# Patient Record
Sex: FEMALE | Race: ASIAN | Employment: OTHER | URBAN - METROPOLITAN AREA
[De-identification: names, ages, dates, MRNs, and addresses within clinical notes are randomized per-mention and may not be internally consistent; named-entity substitution may affect disease eponyms.]

---

## 2017-01-03 ENCOUNTER — OFFICE VISIT (OUTPATIENT)
Dept: FAMILY MEDICINE | Facility: CLINIC | Age: 72
End: 2017-01-03
Payer: COMMERCIAL

## 2017-01-03 VITALS
WEIGHT: 114.1 LBS | RESPIRATION RATE: 16 BRPM | BODY MASS INDEX: 20.99 KG/M2 | DIASTOLIC BLOOD PRESSURE: 72 MMHG | SYSTOLIC BLOOD PRESSURE: 122 MMHG | OXYGEN SATURATION: 96 % | HEART RATE: 73 BPM | HEIGHT: 62 IN | TEMPERATURE: 98.3 F

## 2017-01-03 DIAGNOSIS — G89.29 CHRONIC PAIN OF RIGHT ANKLE: ICD-10-CM

## 2017-01-03 DIAGNOSIS — R21 RASH: ICD-10-CM

## 2017-01-03 DIAGNOSIS — M79.622 PAIN OF LEFT UPPER ARM: Primary | ICD-10-CM

## 2017-01-03 DIAGNOSIS — M25.571 CHRONIC PAIN OF RIGHT ANKLE: ICD-10-CM

## 2017-01-03 DIAGNOSIS — I50.9 HEART FAILURE, UNSPECIFIED HEART FAILURE CHRONICITY, UNSPECIFIED HEART FAILURE TYPE: ICD-10-CM

## 2017-01-03 PROCEDURE — 99213 OFFICE O/P EST LOW 20 MIN: CPT | Performed by: FAMILY MEDICINE

## 2017-01-03 RX ORDER — KETOCONAZOLE 20 MG/G
CREAM TOPICAL DAILY
COMMUNITY
End: 2018-07-19

## 2017-01-03 RX ORDER — LOSARTAN POTASSIUM 25 MG/1
25 TABLET ORAL DAILY
COMMUNITY

## 2017-01-03 NOTE — PROGRESS NOTES
SUBJECTIVE:                                                    Devyn Link is a 71 year old female who presents to clinic today for the following health issues:      Musculoskeletal problem/pain      Duration: x 2 months ago    Description  Location: left upper arm    Intensity:  5-6/10 with arm movement    Accompanying signs and symptoms: none    History  Previous similar problem: no   Previous evaluation:  none    Precipitating or alleviating factors:  Trauma or overuse: YES- after receiving the flu shot noticed the pain and has not gone away  Aggravating factors include: moving the arm in different positions.    Therapies tried and outcome: massage- is effective     Here to discuss itching and redness with the skin on the forehead that has lasted x 1/2 year.  Starting as a small area and has gotten bigger over time. Has tried using the Ketoconazole 2% is not effective.        Patient Active Problem List   Diagnosis     Hyperlipidemia LDL goal <70     CAD (coronary artery disease)     Long term current use of anticoagulant     Hypertension goal BP (blood pressure) < 140/90     Advanced directives, counseling/discussion     Bradycardia     Health Care Home     Chronic atrial fibrillation (H)     Heart failure (H)     Anemia     Pacemaker     Aortic valve replaced     Mitral valve replaced     Abnormal glucose     Prediabetes       Current Outpatient Prescriptions   Medication Sig Dispense Refill     losartan (COZAAR) 25 MG tablet Take 25 mg by mouth daily       ketoconazole (NIZORAL) 2 % cream Apply topically daily       amoxicillin (AMOXIL) 500 MG capsule Take 4 capsules (2,000 mg) by mouth daily Take 4 cap--200mg-- 30-60 minutes before procedure 4 capsule 0     warfarin (COUMADIN) 1 MG tablet Half tablet on Fri 30 tablet      warfarin (COUMADIN) 1 MG tablet Mon, Tues, Wed, Thurs, Sat, Sun 30 tablet      famotidine (PEPCID) 20 MG tablet Take 1 tablet (20 mg) by mouth 2 times daily 60 tablet 1     carvedilol (COREG)  "3.125 MG tablet Take 1 tablet (3.125 mg) by mouth 2 times daily (with meals) 60 tablet      amiodarone (PACERONE/CODARONE) 200 MG tablet Take 1 tablet (200 mg) by mouth daily 60 tablet      spironolactone (ALDACTONE) 25 MG tablet Take 0.5 tablets (12.5 mg) by mouth daily 30 tablet 1     atorvastatin (LIPITOR) 20 MG tablet Take 1 tablet (20 mg) by mouth daily 90 tablet 1     furosemide (LASIX) 20 MG tablet Take 20 mg by mouth Takes every other day 60 tablet 1     nitroglycerin (NITROSTAT) 0.4 MG SL tablet Place 1 tablet under the tongue every 5 minutes as needed for chest pain. 25 tablet 0     aspirin 81 MG tablet Take 1 tablet by mouth daily.  3         ROS:  Here with :    CONSTITUTIONAL:NEGATIVE for fever, chills, change in weight  CV: NEGATIVE for chest pain, palpitations or peripheral edema  MUSCULOSKELETAL: see below  PSYCHIATRIC: NEGATIVE for changes in mood or affect    Left shoulder; hurts with movement.  Noted since the flu shot, but they do not feel this caused it.    Also has decreased balance.  Fell down twice last year. Right ankle. Hurts then gives way.        OBJECTIVE:                                                    /72 mmHg  Pulse 73  Temp(Src) 98.3  F (36.8  C) (Oral)  Resp 16  Ht 5' 2.25\" (1.581 m)  Wt 114 lb 1.6 oz (51.755 kg)  BMI 20.71 kg/m2  SpO2 96%  Body mass index is 20.71 kg/(m^2).  GENERAL APPEARANCE: alert and no distress  CV: regular rates and rhythm  MS: there is minimal to no tenderness with palpation at the deltoid area.  With movement of the arm, especially forward and up, she notes pain in the anterior part of the deltoid.  Ankle has no deformity or swelling.   PSYCH: mentation appears normal and affect normal/bright         ASSESSMENT/PLAN:                                                      Pain of left upper arm  Uncertain etiology. Seems to be near the deltoid. May be improving. Will give more time. Discussed local treatment; consider FSOC    Heart " failure, unspecified heart failure chronicity, unspecified heart failure type (H)  Stable. Completed and discussed.   - HEART FAILURE ACTION PLAN    Chronic pain of right ankle  Discussed some exercises. Consider physical therapy.     Rash    - SKIN CARE REFERRAL      Follow up prn or as previously directed.    Patient Instructions   On the left arm, try some heat and ice.  I would think of heat first. But go with what works. Some find that alternating between the two can be helpful.    If not getting better, I would call the  [facility]. They do have a site in [place]:     [phone].    --------------------------------------      Let me know if you would like to pursue more formal exercise/PT        Foot and Ankle Exercises: Ankle Circles    This exercise is designed to stretch and strengthen your feet and ankles. Before beginning the exercise, read through all the instructions. While exercising, breathe normally. If you feel any pain, stop the exercise. If pain persists, inform your healthcare provider.    Sit straight-legged on the floor or other firm surface.    Resting your ______ calf on a rolled-up towel, use your foot to draw circles in both directions or write the letters of the alphabet in the air.    Continue for ______ seconds. Do ______ times a day.    4874-3266 [employer]. [address] [place] [address]. All rights reserved. This information is not intended as a substitute for professional medical care. Always follow your healthcare professional's instructions.        Bent-Knee Calf Stretch  This exercise is designed to stretch and strengthen your feet and ankles. Before beginning the exercise, read through all the instructions. While exercising, breathe normally and don t bounce. If you feel any pain, stop the exercise. If pain persists, inform your healthcare provider:      Stand an arm s length away from a wall. Place the palms of your hands on the  wall. Step forward about 12 inches with your ______ foot.    Keeping toes pointed forward and both heels on the floor, bend both knees and lean forward. Hold for ______ seconds. Relax.    Repeat ______ times. Do ______ sets a day.    2880-9484 [employer]. [address] [place] [address]. All rights reserved. This information is not intended as a substitute for professional medical care. Always follow your healthcare professional's instructions.        Foot and Ankle Exercises: Single-Leg Heel Raise  This exercise is designed to stretch and strengthen your feet and ankles. Before beginning the exercise, read through all the instructions. While exercising, breathe normally and don t bounce. If you feel any pain, stop the exercise. If pain persists, inform your healthcare provider:    Stand, using a sturdy counter for balance only. Lift 1 foot and stand with your weight on the other foot.    Rise up on your toes, then lower back onto your heel.    Repeat 10 times. Do 3 sets a day.    9174-9060 [employer]. [address] [place] [address]. All rights reserved. This information is not intended as a substitute for professional medical care. Always follow your healthcare professional's instructions.              Prisca Lucero MD, MD  [facility]

## 2017-01-03 NOTE — PATIENT INSTRUCTIONS
On the left arm, try some heat and ice.  I would think of heat first. But go with what works. Some find that alternating between the two can be helpful.    If not getting better, I would call the  [facility] and Orth[facility] clinic. They do have a site in [place]:     [phone].    --------------------------------------      Let me know if you would like to pursue more formal exercise/PT        Foot and Ankle Exercises: Ankle Circles    This exercise is designed to stretch and strengthen your feet and ankles. Before beginning the exercise, read through all the instructions. While exercising, breathe normally. If you feel any pain, stop the exercise. If pain persists, inform your healthcare provider.    Sit straight-legged on the floor or other firm surface.    Resting your ______ calf on a rolled-up towel, use your foot to draw circles in both directions or write the letters of the alphabet in the air.    Continue for ______ seconds. Do ______ times a day.    3510-2412 [employer]. [address] [place] [address]. All rights reserved. This information is not intended as a substitute for professional medical care. Always follow your healthcare professional's instructions.        Bent-Knee Calf Stretch  This exercise is designed to stretch and strengthen your feet and ankles. Before beginning the exercise, read through all the instructions. While exercising, breathe normally and don t bounce. If you feel any pain, stop the exercise. If pain persists, inform your healthcare provider:      Stand an arm s length away from a wall. Place the palms of your hands on the wall. Step forward about 12 inches with your ______ foot.    Keeping toes pointed forward and both heels on the floor, bend both knees and lean forward. Hold for ______ seconds. Relax.    Repeat ______ times. Do ______ sets a day.    6257-0754 [employer]. [address] [place] [address]. All rights reserved.  This information is not intended as a substitute for professional medical care. Always follow your healthcare professional's instructions.        Foot and Ankle Exercises: Single-Leg Heel Raise  This exercise is designed to stretch and strengthen your feet and ankles. Before beginning the exercise, read through all the instructions. While exercising, breathe normally and don t bounce. If you feel any pain, stop the exercise. If pain persists, inform your healthcare provider:    Stand, using a sturdy counter for balance only. Lift 1 foot and stand with your weight on the other foot.    Rise up on your toes, then lower back onto your heel.    Repeat 10 times. Do 3 sets a day.    5906-2175 [employer]. [address] [place] [address], [place] [address]. All rights reserved. This information is not intended as a substitute for professional medical care. Always follow your healthcare professional's instructions.

## 2017-01-03 NOTE — MR AVS SNAPSHOT
After Visit Summary   1/3/2017    Devyn Link    MRN: [id number]           Patient Information     Date Of Birth          1945        Visit Information        Provider Department      1/3/2017 7:15 AM Open, Assignments; Prisca Lucero MD [facility]        Today's Diagnoses     Rash    -  1     Pain of left upper arm         Heart failure, unspecified heart failure chronicity, unspecified heart failure type (H)           Care Instructions    On the left arm, try some heat and ice.  I would think of heat first. But go with what works. Some find that alternating between the two can be helpful.    If not getting better, I would call the  Sports and Ortho clinic. They do have a site in [place]:     [phone].    --------------------------------------      Let me know if you would like to pursue more formal exercise/PT        Foot and Ankle Exercises: Ankle Circles    This exercise is designed to stretch and strengthen your feet and ankles. Before beginning the exercise, read through all the instructions. While exercising, breathe normally. If you feel any pain, stop the exercise. If pain persists, inform your healthcare provider.    Sit straight-legged on the floor or other firm surface.    Resting your ______ calf on a rolled-up towel, use your foot to draw circles in both directions or write the letters of the alphabet in the air.    Continue for ______ seconds. Do ______ times a day.    8756-5185 [employer]. [address] [place] [address], [place] [address]. All rights reserved. This information is not intended as a substitute for professional medical care. Always follow your healthcare professional's instructions.        Bent-Knee Calf Stretch  This exercise is designed to stretch and strengthen your feet and ankles. Before beginning the exercise, read through all the instructions. While exercising, breathe normally and don t bounce. If you feel any pain, stop the  exercise. If pain persists, inform your healthcare provider:      Stand an arm s length away from a wall. Place the palms of your hands on the wall. Step forward about 12 inches with your ______ foot.    Keeping toes pointed forward and both heels on the floor, bend both knees and lean forward. Hold for ______ seconds. Relax.    Repeat ______ times. Do ______ sets a day.    8858-9584 [employer]. [address] [place] [address]. All rights reserved. This information is not intended as a substitute for professional medical care. Always follow your healthcare professional's instructions.        Foot and Ankle Exercises: Single-Leg Heel Raise  This exercise is designed to stretch and strengthen your feet and ankles. Before beginning the exercise, read through all the instructions. While exercising, breathe normally and don t bounce. If you feel any pain, stop the exercise. If pain persists, inform your healthcare provider:    Stand, using a sturdy counter for balance only. Lift 1 foot and stand with your weight on the other foot.    Rise up on your toes, then lower back onto your heel.    Repeat 10 times. Do 3 sets a day.    7282-7649 [employer]. [address] [place] [address]. All rights reserved. This information is not intended as a substitute for professional medical care. Always follow your healthcare professional's instructions.              Follow-ups after your visit        Additional Services     SKIN CARE REFERRAL       Your provider has referred you to: [facility]: [facility] [place] [phone]   [email or web address]     Please be aware that coverage of these services is subject to the terms and limitations of your health insurance plan.  Please check with your insurance prior to the appointment to ensure appropriate coverage for any services considered cosmetic in nature or not medically  "necessary.    Please bring the following with you to your appointment:    (1) Any X-Rays, CTs or MRIs which have been performed.  Contact the facility where they were done to arrange for  prior to your scheduled appointment.  Any new CT, MRI or other procedures ordered by your specialist must be performed at a [facility] facility or coordinated by your clinic's referral office.  (2) List of current medications  (3) This referral request   (4) Any documents/labs given to you for this referral                  Who to contact     If you have questions or need follow up information about today's clinic visit or your schedule please contact [facility] directly at [phone].  Normal or non-critical lab and imaging results will be communicated to you by MyChart, letter or phone within 4 business days after the clinic has received the results. If you do not hear from us within 7 days, please contact the clinic through [employer] or phone. If you have a critical or abnormal lab result, we will notify you by phone as soon as possible.  Submit refill requests through [email or web address] or call your pharmacy and they will forward the refill request to us. Please allow 3 business days for your refill to be completed.          Additional Information About Your Visit        [facility] Information     [email or web address] gives you secure access to your electronic health record. If you see a primary care provider, you can also send messages to your care team and make appointments. If you have questions, please call your primary care clinic.  If you do not have a primary care provider, please call [phone] and they will assist you.        Your Vitals Were     Pulse Temperature Respirations Height BMI (Body Mass Index) Pulse Oximetry    73 98.3  F (36.8  C) (Oral) 16 5' 2.25\" (1.581 m) 20.71 kg/m2 96%       Blood Pressure from Last 3 Encounters:   01/03/17 122/72   08/30/16 116/62   04/14/16 114/60    Weight from Last 3 Encounters: "   01/03/17 114 lb 1.6 oz (51.755 kg)   08/30/16 109 lb 11.2 oz (49.76 kg)   04/14/16 120 lb 6.4 oz (54.613 kg)              We Performed the Following     HEART FAILURE ACTION PLAN     SKIN CARE REFERRAL          Today's Medication Changes          These changes are accurate as of: 1/3/17  8:21 AM.  If you have any questions, ask your nurse or doctor.               These medicines have changed or have updated prescriptions.        Dose/Directions    losartan 25 MG tablet   Commonly known as:  COZAAR   This may have changed:  Another medication with the same name was removed. Continue taking this medication, and follow the directions you see here.   Changed by:  Prisca Lucero MD        Dose:  25 mg   Take 25 mg by mouth daily   Refills:  0                Primary Care Provider Office Phone # Fax #    Prisca Lucero [phone]       [facility] [address] [facility] [address]        Thank you!     Thank you for choosing [facility]  for your care. Our goal is always to provide you with excellent care. Hearing back from our patients is one way we can continue to improve our services. Please take a few minutes to complete the written survey that you may receive in the mail after your visit with us. Thank you!             Your Updated Medication List - Protect others around you: Learn how to safely use, store and throw away your medicines at www.disposemymeds.org.          This list is accurate as of: 1/3/17  8:21 AM.  Always use your most recent med list.                   Brand Name Dispense Instructions for use    amiodarone 200 MG tablet    PACERONE/CODARONE    60 tablet    Take 1 tablet (200 mg) by mouth daily       amoxicillin 500 MG capsule    AMOXIL    4 capsule    Take 4 capsules (2,000 mg) by mouth daily Take 4 cap--200mg-- 30-60 minutes before procedure       aspirin 81 MG tablet      Take 1 tablet by mouth daily.       carvedilol 3.125 MG tablet    COREG     60 tablet    Take 1 tablet (3.125 mg) by mouth 2 times daily (with meals)       famotidine 20 MG tablet    PEPCID    60 tablet    Take 1 tablet (20 mg) by mouth 2 times daily       ketoconazole 2 % cream    NIZORAL     Apply topically daily       LASIX 20 MG tablet   Generic drug:  furosemide     60 tablet    Take 20 mg by mouth Takes every other day       LIPITOR 20 MG tablet   Generic drug:  atorvastatin     90 tablet    Take 1 tablet (20 mg) by mouth daily       losartan 25 MG tablet    COZAAR     Take 25 mg by mouth daily       nitroglycerin 0.4 MG sublingual tablet    NITROSTAT    25 tablet    Place 1 tablet under the tongue every 5 minutes as needed for chest pain.       spironolactone 25 MG tablet    ALDACTONE    30 tablet    Take 0.5 tablets (12.5 mg) by mouth daily       * warfarin 1 MG tablet    COUMADIN    30 tablet    Half tablet on Fri       * warfarin 1 MG tablet    COUMADIN    30 tablet    Mon, Tues, Wed, Thurs, Sat, Sun       * Notice:  This list has 2 medication(s) that are the same as other medications prescribed for you. Read the directions carefully, and ask your doctor or other care provider to review them with you.

## 2017-01-03 NOTE — NURSING NOTE
"Chief Complaint   Patient presents with     Musculoskeletal Problem     left arm     Derm Problem       Initial /72 mmHg  Pulse 73  Temp(Src) 98.3  F (36.8  C) (Oral)  Resp 16  Ht 5' 2.25\" (1.581 m)  Wt 114 lb 1.6 oz (51.755 kg)  BMI 20.71 kg/m2  SpO2 96% Estimated body mass index is 20.71 kg/(m^2) as calculated from the following:    Height as of this encounter: 5' 2.25\" (1.581 m).    Weight as of this encounter: 114 lb 1.6 oz (51.755 kg).  BP completed using cuff size: pediatric  Dianne Turcios CMA      "

## 2017-01-03 NOTE — Clinical Note
My Heart Failure Action Plan   Name: Devyn Link    YOB: 1945   Date: 1/3/2017    My doctor: Prisca Lucero     [address] [address] [phone]  My Diagnosis:    My Ejection Fraction:     My Exercise Goal: 30 minutes daily  .     My Weight Goal: 114-115   Wt Readings from Last 2 Encounters:   01/03/17 114 lb 1.6 oz (51.755 kg)   08/30/16 109 lb 11.2 oz (49.76 kg)     Weigh yourself daily using the same scale. If you gain more than 2 pounds in 24 hours or 5 pounds in a week call the clinic    My Diet Goal: No added salt    Emergency Room Visits:    Our goal is to improve your quality of life and help you avoid a visit to the emergency room or hospital.  If we work together, we can achieve this goal. But, if you feel you need to call 911 or go to the emergency room, please do so.  If you go to the emergency room, please bring your list of medicines and your daily weight chart with you.       GREEN ZONE     Doing well today    Weight gained is no more than 2 pounds a day or 5 pounds a week.    No swelling in feet, ankles, legs or stomach.    No more swelling than usual.    No more trouble breathing than usual.    No change in my sleep.    No other problems. Actions:    I am doing fine.  I will take my medicine, follow my diet, see my doctor, exercise, and watch for symptoms.           YELLOW ZONE         Having a bad day or flare up    Weight gain of more than 2 pounds in one day or 5 pounds in one week.    New swelling in ankle, leg, knee or thigh.    Bloating in belly, pants feel tighter.    Swelling in hands or face.    Coughing or trouble breathing while walking or talking.    Harder to breathe last night.    Have trouble sleeping, wake up short of breath.    Much more tired than usual.    Not eating.    Pain in my chest or bad leg cramps.    Feel weak or dizzy. Actions:    I need to take action and call my doctor or nurse today.                  RED ZONE         Need medical care now    Weight gain of 5 pounds overnight.    Chest pain or pressure that does not go away.    Feel less alert.    Wheezing or have trouble breathing when at rest.    Cannot sleep lying down.    Cannot take my water pill.    Pass out or faint. Actions:    I need to call my doctor or nurse now!    Call 911 if I have chest pain or cannot breathe.        Electronically signed by: Prisca Lucero MD, January 3, 2017

## 2017-01-13 ENCOUNTER — OFFICE VISIT (OUTPATIENT)
Dept: FAMILY MEDICINE | Facility: CLINIC | Age: 72
End: 2017-01-13
Payer: COMMERCIAL

## 2017-01-13 DIAGNOSIS — R21 LOCALIZED MACULAR RASH: ICD-10-CM

## 2017-01-13 PROCEDURE — 99213 OFFICE O/P EST LOW 20 MIN: CPT | Performed by: FAMILY MEDICINE

## 2017-01-13 RX ORDER — DESONIDE 0.5 MG/G
OINTMENT TOPICAL
Qty: 15 G | Refills: 0 | Status: CANCELLED | OUTPATIENT
Start: 2017-01-13

## 2017-01-13 RX ORDER — DESONIDE 0.5 MG/G
CREAM TOPICAL
Qty: 15 G | Refills: 0 | Status: SHIPPED
Start: 2017-01-13 | End: 2017-01-13

## 2017-01-13 RX ORDER — DESONIDE 0.5 MG/G
CREAM TOPICAL
Qty: 15 G | Refills: 0 | Status: SHIPPED | OUTPATIENT
Start: 2017-01-13 | End: 2018-07-19

## 2017-01-13 NOTE — PATIENT INSTRUCTIONS
"FUTURE APPOINTMENTS  Follow up in 2 week(s) if symptoms not resolving.    TOPICAL STEROID INSTRUCTIONS  Desonide 0.05% cream.    Apply an amount equal to half of a fingertip (0.25 g) to the affected area(s) on the left forehead, two times per day for 7-10 days.    \"Fingertip Amount\"      This is a weak strength steroid, and it can be used on the face.    Not to be used consecutively for more than 10 days.    Topical steroid use is for short-term treatment only. Although you can use this as needed for flare-ups, if after initial treatment, you are using this for prolonged periods of time (i.e. every day of the week), re-check for evaluation of treatment.    Keep in mind to also regularly use moisturizer, as this preventative measure can help maintain your skin's natural moisture barrier.    DRY SKIN INSTRUCTIONS  Routine use of moisturizer is important for healthy, resilient skin.    Twice daily use of a moisturizer such as over-the-counter (OTC) CeraVe moisturizer cream (in the jar) or OTC Cetaphil RestoraDerm moisturizer. These contain ceramides and filaggrin proteins that can help to maintain the body's moisture layer.    Always apply moisturizer after washing, within 3 minutes of drying off for best effect.    Do not overuse soap. Just apply soap on the groin and armpits, unless you have been sweating extensively. Recommended products include OTC unscented Dove sensitive skin or OTC Vanicream cleansing bar.    Good facial cleansers include OTC CeraVe hydrating facial cleanser or OTC Cetaphil daily facial cleanser.    Avoid use of scented products and/or antistatic dryer sheets.  "

## 2017-01-13 NOTE — MR AVS SNAPSHOT
"              After Visit Summary   1/13/2017    Devyn Link    MRN: [id number]           Patient Information     Date Of Birth          1945        Visit Information        Provider Department      1/13/2017 10:15 AM Krystina Wilder MD; [place] [employer] [facility] - Primary Care Skin        Today's Diagnoses     Localized macular rash           Care Instructions    FUTURE APPOINTMENTS  Follow up in 2 week(s) if symptoms not resolving.    TOPICAL STEROID INSTRUCTIONS  Desonide 0.05% cream.    Apply an amount equal to half of a fingertip (0.25 g) to the affected area(s) on the left forehead, two times per day for 7-10 days.    \"Fingertip Amount\"      This is a weak strength steroid, and it can be used on the face.    Not to be used consecutively for more than 10 days.    Topical steroid use is for short-term treatment only. Although you can use this as needed for flare-ups, if after initial treatment, you are using this for prolonged periods of time (i.e. every day of the week), re-check for evaluation of treatment.    Keep in mind to also regularly use moisturizer, as this preventative measure can help maintain your skin's natural moisture barrier.    DRY SKIN INSTRUCTIONS  Routine use of moisturizer is important for healthy, resilient skin.    Twice daily use of a moisturizer such as over-the-counter (OTC) CeraVe moisturizer cream (in the jar) or OTC Cetaphil RestoraDerm moisturizer. These contain ceramides and filaggrin proteins that can help to maintain the body's moisture layer.    Always apply moisturizer after washing, within 3 minutes of drying off for best effect.    Do not overuse soap. Just apply soap on the groin and armpits, unless you have been sweating extensively. Recommended products include OTC unscented Dove sensitive skin or OTC Vanicream cleansing bar.    Good facial cleansers include OTC CeraVe hydrating facial cleanser or OTC Cetaphil daily facial " cleanser.    Avoid use of scented products and/or antistatic dryer sheets.        Follow-ups after your visit        Who to contact     If you have questions or need follow up information about today's clinic visit or your schedule please contact [facility] - PRIMARY CARE SKIN directly at [phone].  Normal or non-critical lab and imaging results will be communicated to you by MyChart, letter or phone within 4 business days after the clinic has received the results. If you do not hear from us within 7 days, please contact the clinic through [employer] or phone. If you have a critical or abnormal lab result, we will notify you by phone as soon as possible.  Submit refill requests through [employer] or call your pharmacy and they will forward the refill request to us. Please allow 3 business days for your refill to be completed.          Additional Information About Your Visit        MyChart Information     [employer] gives you secure access to your electronic health record. If you see a primary care provider, you can also send messages to your care team and make appointments. If you have questions, please call your primary care clinic.  If you do not have a primary care provider, please call [phone] and they will assist you.        Care EveryWhere ID     This is your Care EveryWhere ID. This could be used by other organizations to access your [place] medical records  [id number]         Blood Pressure from Last 3 Encounters:   01/03/17 122/72   08/30/16 116/62   04/14/16 114/60    Weight from Last 3 Encounters:   01/03/17 114 lb 1.6 oz (51.755 kg)   08/30/16 109 lb 11.2 oz (49.76 kg)   04/14/16 120 lb 6.4 oz (54.613 kg)              Today, you had the following     No orders found for display         Today's Medication Changes          These changes are accurate as of: 1/13/17 10:52 AM.  If you have any questions, ask your nurse or doctor.               Start taking these medicines.        Dose/Directions     desonide 0.05 % cream   Commonly known as:  DESOWEN   Used for:  Localized macular rash   Started by:  Krystina Wilder MD        Apply sparingly to affected area three times daily for 14 days.   Quantity:  15 g   Refills:  0            Where to get your medicines      These medications were sent to [facility]  [address] [facility] [address]    Hours:  Mail Order Phone:  [phone]    - desonide 0.05 % cream             Primary Care Provider Office Phone # Fax #    Prisca Lucero [phone]       [facility] [address] [facility] [address]        Thank you!     Thank you for choosing [facility] - PRIMARY CARE SKIN  for your care. Our goal is always to provide you with excellent care. Hearing back from our patients is one way we can continue to improve our services. Please take a few minutes to complete the written survey that you may receive in the mail after your visit with us. Thank you!             Your Updated Medication List - Protect others around you: Learn how to safely use, store and throw away your medicines at www.disposemymeds.org.          This list is accurate as of: 1/13/17 10:52 AM.  Always use your most recent med list.                   Brand Name Dispense Instructions for use    amiodarone 200 MG tablet    PACERONE/CODARONE    60 tablet    Take 1 tablet (200 mg) by mouth daily       amoxicillin 500 MG capsule    AMOXIL    4 capsule    Take 4 capsules (2,000 mg) by mouth daily Take 4 cap--200mg-- 30-60 minutes before procedure       aspirin 81 MG tablet      Take 1 tablet by mouth daily.       carvedilol 3.125 MG tablet    COREG    60 tablet    Take 1 tablet (3.125 mg) by mouth 2 times daily (with meals)       desonide 0.05 % cream    DESOWEN    15 g    Apply sparingly to affected area three times daily for 14 days.       famotidine 20 MG tablet    PEPCID    60 tablet    Take 1 tablet (20 mg) by mouth 2 times  daily       ketoconazole 2 % cream    NIZORAL     Apply topically daily       LASIX 20 MG tablet   Generic drug:  furosemide     60 tablet    Take 20 mg by mouth Takes every other day       LIPITOR 20 MG tablet   Generic drug:  atorvastatin     90 tablet    Take 1 tablet (20 mg) by mouth daily       losartan 25 MG tablet    COZAAR     Take 25 mg by mouth daily       nitroglycerin 0.4 MG sublingual tablet    NITROSTAT    25 tablet    Place 1 tablet under the tongue every 5 minutes as needed for chest pain.       spironolactone 25 MG tablet    ALDACTONE    30 tablet    Take 0.5 tablets (12.5 mg) by mouth daily       * warfarin 1 MG tablet    COUMADIN    30 tablet    Half tablet on Fri       * warfarin 1 MG tablet    COUMADIN    30 tablet    Mon, Tues, Wed, Thurs, Sat, Sun       * Notice:  This list has 2 medication(s) that are the same as other medications prescribed for you. Read the directions carefully, and ask your doctor or other care provider to review them with you.

## 2017-01-13 NOTE — PROGRESS NOTES
[facility] - PRIMARY CARE SKIN    CC : itchiness   SUBJECTIVE:                                                    Devyn Link is a 71 year old female who presents to clinic today with  and with  because of a(n) red are on the left upper forehead beginning 3-4 months ago, without migrating but has been enlarging in size. She denies any involvement of elbows. No dandruff reported. She is reluctant to pursue a biopsy, because she feels healthy at this time.    Pruritic : mildly itchy. No tenderness reported.  Symptoms appear to be : not changing over the course of time. No other weight loss nor respiratory issues reported.  Aggravating factors : none identified.  Relieving factors : none identified.    Previous history of a similar rash : NO.  Recent exposure history : none known   Any other family members with similar symptoms : NO.    Therapies tried for rash : Rx ketoconazole 2% cream.    Personal Medical History  Skin Cancer : NO   Eczema Psoriasis Rosacea Autoimmune Other   NO NO NO NO NO     Family Medical History  Skin Cancer : NO  Myalgias/Arthralgias : NO  Eczema Psoriasis Rosacea Autoimmune Other   NO NO NO NO NO       Patient Active Problem List   Diagnosis     Hyperlipidemia LDL goal <70     CAD (coronary artery disease)     Long term current use of anticoagulant     Hypertension goal BP (blood pressure) < 140/90     Advanced directives, counseling/discussion     Bradycardia     Health Care Home     Chronic atrial fibrillation (H)     Heart failure (H)     Anemia     Pacemaker     Aortic valve replaced     Mitral valve replaced     Abnormal glucose     Prediabetes       Past Medical History   Diagnosis Date     Hypertension      Myocardial infarction (H)      Pacemaker     Past Surgical History   Procedure Laterality Date     C replacement of mitral valve  1994     Done at [facility]     Heart cath drug eluting stent placement  2004/2007     seeing Cardiologist at [facility]      Surgical history of -   ~ 1995     mechanical aortic valve     Surgical history of -        pacemaker     Surgical history of -        mechanical mitral valve      Social History   Substance Use Topics     Smoking status: Never Smoker      Smokeless tobacco: Never Used     Alcohol Use: No    Family History     Problem (# of Occurrences) Relation (Name,Age of Onset)    CANCER (1) Mother: lung    Hypertension (2) Mother, Sister           Prescription Medications as of 1/13/2017             desonide (DESOWEN) 0.05 % cream Apply sparingly to affected area three times daily for 14 days.    losartan (COZAAR) 25 MG tablet Take 25 mg by mouth daily    ketoconazole (NIZORAL) 2 % cream Apply topically daily    amoxicillin (AMOXIL) 500 MG capsule Take 4 capsules (2,000 mg) by mouth daily Take 4 cap--200mg-- 30-60 minutes before procedure    warfarin (COUMADIN) 1 MG tablet Half tablet on Fri    warfarin (COUMADIN) 1 MG tablet Mon, Tues, Wed, Thurs, Sat, Sun    famotidine (PEPCID) 20 MG tablet Take 1 tablet (20 mg) by mouth 2 times daily    carvedilol (COREG) 3.125 MG tablet Take 1 tablet (3.125 mg) by mouth 2 times daily (with meals)    spironolactone (ALDACTONE) 25 MG tablet Take 0.5 tablets (12.5 mg) by mouth daily    atorvastatin (LIPITOR) 20 MG tablet Take 1 tablet (20 mg) by mouth daily    furosemide (LASIX) 20 MG tablet Take 20 mg by mouth Takes every other day    nitroglycerin (NITROSTAT) 0.4 MG SL tablet Place 1 tablet under the tongue every 5 minutes as needed for chest pain.    aspirin 81 MG tablet Take 1 tablet by mouth daily.    amiodarone (PACERONE/CODARONE) 200 MG tablet Take 1 tablet (200 mg) by mouth daily            Allergies   Allergen Reactions     No Known Allergies         INTEGUMENTARY/SKIN: POSITIVE for rash and pruritis  ROS : 14 point review of systems was negative except the symptoms listed above in the HPI.    This document serves as a record of the services and decisions personally performed and made  "by Deborah Wilder MD. It was created on her behalf by John Bender, a trained medical scribe.  The creation of this document is based on the scribe's personal observations and the provider's statements to the medical scribe.  John Bender, January 13, 2017 10:34 AM      OBJECTIVE:                                                    GENERAL: healthy, alert and no distress  SKIN: Phillips Skin Type - III.  Scalp and Face were examined. The dermatoscope was used to help evaluate pigmented lesions.  Skin Pertinent Findings:  Left forehead : 2.5 cm in size, dully erythematous patch with 3 cm linear extension and slight central hyperpigmentation. Non-scaly.    Scalp : Clear.     No other facial skin eruption.    Diagnostic Test Results:  none     MDM : Localized macular eruption of uncertain etiology ? Granuloma faciale ? Sarcoid ? Other. She wanted to do a trial of topical steroid first, but recommended biopsy if this does not clear.      ASSESSMENT:                                                      Encounter Diagnosis   Name Primary?     Localized macular rash          PLAN:                                                    Patient Instructions   FUTURE APPOINTMENTS  Follow up in 2 week(s) if symptoms not resolving.    TOPICAL STEROID INSTRUCTIONS  Desonide 0.05% cream.    Apply an amount equal to half of a fingertip (0.25 g) to the affected area(s) on the left forehead, two times per day for 7-10 days.    \"Fingertip Amount\"      This is a weak strength steroid, and it can be used on the face.    Not to be used consecutively for more than 10 days.    Topical steroid use is for short-term treatment only. Although you can use this as needed for flare-ups, if after initial treatment, you are using this for prolonged periods of time (i.e. every day of the week), re-check for evaluation of treatment.    Keep in mind to also regularly use moisturizer, as this preventative measure can help maintain your skin's natural " moisture barrier.    DRY SKIN INSTRUCTIONS  Routine use of moisturizer is important for healthy, resilient skin.    Twice daily use of a moisturizer such as over-the-counter (OTC) CeraVe moisturizer cream (in the jar) or OTC Cetaphil RestoraDerm moisturizer. These contain ceramides and filaggrin proteins that can help to maintain the body's moisture layer.    Always apply moisturizer after washing, within 3 minutes of drying off for best effect.    Do not overuse soap. Just apply soap on the groin and armpits, unless you have been sweating extensively. Recommended products include OTC unscented Dove sensitive skin or OTC Vanicream cleansing bar.    Good facial cleansers include OTC CeraVe hydrating facial cleanser or OTC Cetaphil daily facial cleanser.    Avoid use of scented products and/or antistatic dryer sheets.      The patient was counseled to use prouse of heavy bland emollient creams was impressed upon the patient.ducts free of fragrance, dyes, and plants. The importance of using bland cleansers and the regular       PROCEDURES:                                                    None.    TT : 20 minutes.  CT : 15 minutes.      The information in this document, created by the medical scribe for me, accurately reflects the services I personally performed and the decisions made by me. I have reviewed and approved this document for accuracy prior to leaving the patient care area.  Deborah Wilder MD January 13, 2017 10:34 AM  [facility] - PRIMARY CARE SKIN

## 2017-02-27 ENCOUNTER — DOCUMENTATION ONLY (OUTPATIENT)
Dept: FAMILY MEDICINE | Facility: CLINIC | Age: 72
End: 2017-02-27

## 2017-02-27 NOTE — PROGRESS NOTES
Panel Management Review      Patient has the following on her problem list: None      Composite cancer screening  Chart review shows that this patient is due/due soon for the following Colonoscopy  Summary:    Patient is due/failing the following:   COLONOSCOPY    Action needed:   Patient needs referral/order: Colonoscopy    Type of outreach:    Sent [employer] message.    Questions for provider review:    None                                                                                                                                    Ashley Stovall CMA (AAMA) 2/27/2017 2:42 PM       Chart routed to  .

## 2017-04-11 ENCOUNTER — TRANSFERRED RECORDS (OUTPATIENT)
Dept: HEALTH INFORMATION MANAGEMENT | Facility: CLINIC | Age: 72
End: 2017-04-11

## 2017-07-18 ENCOUNTER — OFFICE VISIT (OUTPATIENT)
Dept: FAMILY MEDICINE | Facility: CLINIC | Age: 72
End: 2017-07-18
Payer: COMMERCIAL

## 2017-07-18 ENCOUNTER — RADIANT APPOINTMENT (OUTPATIENT)
Dept: GENERAL RADIOLOGY | Facility: CLINIC | Age: 72
End: 2017-07-18
Attending: FAMILY MEDICINE
Payer: COMMERCIAL

## 2017-07-18 VITALS
OXYGEN SATURATION: 99 % | SYSTOLIC BLOOD PRESSURE: 112 MMHG | DIASTOLIC BLOOD PRESSURE: 60 MMHG | HEIGHT: 65 IN | BODY MASS INDEX: 18.29 KG/M2 | RESPIRATION RATE: 16 BRPM | WEIGHT: 109.8 LBS | TEMPERATURE: 97.9 F | HEART RATE: 80 BPM

## 2017-07-18 DIAGNOSIS — R60.9 EDEMA, UNSPECIFIED TYPE: ICD-10-CM

## 2017-07-18 DIAGNOSIS — I48.20 CHRONIC ATRIAL FIBRILLATION (H): ICD-10-CM

## 2017-07-18 DIAGNOSIS — R79.89 LOW TSH LEVEL: ICD-10-CM

## 2017-07-18 DIAGNOSIS — M79.645 THUMB PAIN, LEFT: ICD-10-CM

## 2017-07-18 DIAGNOSIS — I50.9 CHRONIC HEART FAILURE, UNSPECIFIED HEART FAILURE TYPE (H): ICD-10-CM

## 2017-07-18 DIAGNOSIS — S69.92XA THUMB INJURY, LEFT, INITIAL ENCOUNTER: Primary | ICD-10-CM

## 2017-07-18 PROCEDURE — 73140 X-RAY EXAM OF FINGER(S): CPT | Mod: LT

## 2017-07-18 PROCEDURE — 99214 OFFICE O/P EST MOD 30 MIN: CPT | Performed by: FAMILY MEDICINE

## 2017-07-18 NOTE — NURSING NOTE
"Chief Complaint   Patient presents with     Thumb Discomfort       Initial /60 (BP Location: Right arm, Cuff Size: Adult Regular)  Pulse 80  Temp 97.9  F (36.6  C) (Oral)  Resp 16  Ht 5' 5.25\" (1.657 m)  Wt 109 lb 12.8 oz (49.8 kg)  SpO2 99%  BMI 18.13 kg/m2 Estimated body mass index is 18.13 kg/(m^2) as calculated from the following:    Height as of this encounter: 5' 5.25\" (1.657 m).    Weight as of this encounter: 109 lb 12.8 oz (49.8 kg).  Medication Reconciliation: complete   Dianne Turcios, CHRISTOPH      "

## 2017-07-18 NOTE — MR AVS SNAPSHOT
After Visit Summary   7/18/2017    Devyn Link    MRN: [id number]           Patient Information     Date Of Birth          1945        Visit Information        Provider Department      7/18/2017 1:45 PM Open, Assignments; Prisca Lucero MD [facility]        Today's Diagnoses     Thumb injury, left, initial encounter    -  1    Thumb pain, left        Low TSH level        Edema, unspecified type        Chronic atrial fibrillation (H)        Chronic heart failure, unspecified heart failure type (H)          Care Instructions    Your TSH is low. It was back in April as well.  We can see what the Cardiologist recommends.           Follow-ups after your visit        Additional Services     ORTHO  REFERRAL       [facility] is referring you to the Orthopedic  Services at [facility].       The  Representative will assist you in the coordination of your Orthopedic and Musculoskeletal Care as prescribed by your physician.    The  Representative will call you within 1 business day to help schedule your appointment, or you may contact the  Representative at:    All areas ~ [phone]     Type of Referral : Surgical / Specialist       Timeframe requested: 3 - 5 days    Coverage of these services is subject to the terms and limitations of your health insurance plan.  Please call member services at your health plan with any benefit or coverage questions.      If X-rays, CT or MRI's have been performed, please contact the facility where they were done to arrange for , prior to your scheduled appointment.  Please bring this referral request to your appointment and present it to your specialist.                  Who to contact     If you have questions or need follow up information about today's clinic visit or your schedule please contact [facility] directly at [phone].  Normal or  "non-critical lab and imaging results will be communicated to you by MyChart, letter or phone within 4 business days after the clinic has received the results. If you do not hear from us within 7 days, please contact the clinic through [email or web address] or phone. If you have a critical or abnormal lab result, we will notify you by phone as soon as possible.  Submit refill requests through [employer] or call your pharmacy and they will forward the refill request to us. Please allow 3 business days for your refill to be completed.          Additional Information About Your Visit        [employer] Information     [employer] gives you secure access to your electronic health record. If you see a primary care provider, you can also send messages to your care team and make appointments. If you have questions, please call your primary care clinic.  If you do not have a primary care provider, please call [phone] and they will assist you.        Care EveryWhere ID     This is your Care EveryWhere ID. This could be used by other organizations to access your [place] medical records  [id number]        Your Vitals Were     Pulse Temperature Respirations Height Pulse Oximetry BMI (Body Mass Index)    80 97.9  F (36.6  C) (Oral) 16 5' 5.25\" (1.657 m) 99% 18.13 kg/m2       Blood Pressure from Last 3 Encounters:   07/18/17 112/60   01/03/17 122/72   08/30/16 116/62    Weight from Last 3 Encounters:   07/18/17 109 lb 12.8 oz (49.8 kg)   01/03/17 114 lb 1.6 oz (51.8 kg)   08/30/16 109 lb 11.2 oz (49.8 kg)              We Performed the Following     ORTHO  REFERRAL        Primary Care Provider Office Phone # Fax #    Prisca Lucero [phone]       [facility] [address] [address] [facility] [address]        Equal Access to Services     GRANT CORTES AH: Hadii eun moraleso Sochauali, waaxda luqadaha, qaybta kaalmada adeegyada, waxay jazmine jackman. So [facility] [phone].    ATENCIÓN: Si habla " español, tiene a muir disposición servicios gratuitos de asistencia lingüística. Sarath dumont [phone].    We comply with applicable federal civil rights laws and [place] laws. We do not discriminate on the basis of race, color, national origin, age, disability sex, sexual orientation or gender identity.            Thank you!     Thank you for choosing [facility]  for your care. Our goal is always to provide you with excellent care. Hearing back from our patients is one way we can continue to improve our services. Please take a few minutes to complete the written survey that you may receive in the mail after your visit with us. Thank you!             Your Updated Medication List - Protect others around you: Learn how to safely use, store and throw away your medicines at www.disposemymeds.org.          This list is accurate as of: 7/18/17  2:53 PM.  Always use your most recent med list.                   Brand Name Dispense Instructions for use Diagnosis    amiodarone 200 MG tablet    PACERONE/CODARONE    60 tablet    Take 1 tablet (200 mg) by mouth daily        amoxicillin 500 MG capsule    AMOXIL    4 capsule    Take 4 capsules (2,000 mg) by mouth daily Take 4 cap--200mg-- 30-60 minutes before procedure    Need for SBE (subacute bacterial endocarditis) prophylaxis       aspirin 81 MG tablet      Take 1 tablet by mouth daily.    Routine general medical examination at a health care facility, CAD (coronary artery disease)       carvedilol 3.125 MG tablet    COREG    60 tablet    Take 1 tablet (3.125 mg) by mouth 2 times daily (with meals)        desonide 0.05 % cream    DESOWEN    15 g    Apply sparingly to affected area three times daily for 14 days.    Localized macular rash       famotidine 20 MG tablet    PEPCID    60 tablet    Take 1 tablet (20 mg) by mouth 2 times daily        ketoconazole 2 % cream    NIZORAL     Apply topically daily        LASIX 20 MG tablet   Generic drug:  furosemide     60  tablet    Take 20 mg by mouth Takes every other day    Heart failure (H), Hypertension goal BP (blood pressure) < 140/90       LIPITOR 20 MG tablet   Generic drug:  atorvastatin     90 tablet    Take 1 tablet (20 mg) by mouth daily        losartan 25 MG tablet    COZAAR     Take 25 mg by mouth daily        nitroGLYcerin 0.4 MG sublingual tablet    NITROSTAT    25 tablet    Place 1 tablet under the tongue every 5 minutes as needed for chest pain.    CAD (coronary artery disease)       spironolactone 25 MG tablet    ALDACTONE    30 tablet    Take 0.5 tablets (12.5 mg) by mouth daily        * warfarin 1 MG tablet    COUMADIN    30 tablet    Half tablet on Fri        * warfarin 1 MG tablet    COUMADIN    30 tablet    Mon, Tues, Wed, Thurs, Sat, Sun    Heart failure, unspecified heart failure chronicity, unspecified heart failure type (H)       * Notice:  This list has 2 medication(s) that are the same as other medications prescribed for you. Read the directions carefully, and ask your doctor or other care provider to review them with you.

## 2017-07-18 NOTE — PROGRESS NOTES
SUBJECTIVE:                                                    Devyn Link is a 71 year old female who presents to clinic today for the following health issues:      Musculoskeletal problem/pain      Duration: x April 20th    Description  Location: thumb joint on the left hand    Intensity:  moderate    Accompanying signs and symptoms: previously had swelling    History  Previous similar problem: no   Previous evaluation:  none    Precipitating or alleviating factors:  Trauma or overuse: YES- fell on it  Aggravating factors include: movement of the thumb    Therapies tried and outcome: heat and ice      Would like to discuss swelling bilateral legs during overseas.  Has gotten better.  Cardiologist has suggested increasing the water pill. Has been effective.  Saw Cardiologist today and has been notified has afib.  Would like to discuss.    Problem list and histories reviewed & adjusted, as indicated.  Additional history:     See under ROS     Patient Active Problem List   Diagnosis     Hyperlipidemia LDL goal <70     CAD (coronary artery disease)     Long term current use of anticoagulant     Hypertension goal BP (blood pressure) < 140/90     Advanced directives, counseling/discussion     Bradycardia     Health Care Home     Chronic atrial fibrillation (H)     Heart failure (H)     Anemia     Pacemaker     Aortic valve replaced     Mitral valve replaced     Abnormal glucose     Prediabetes       Current Outpatient Prescriptions   Medication Sig Dispense Refill     desonide (DESOWEN) 0.05 % cream Apply sparingly to affected area three times daily for 14 days. 15 g 0     losartan (COZAAR) 25 MG tablet Take 25 mg by mouth daily       ketoconazole (NIZORAL) 2 % cream Apply topically daily       amoxicillin (AMOXIL) 500 MG capsule Take 4 capsules (2,000 mg) by mouth daily Take 4 cap--200mg-- 30-60 minutes before procedure 4 capsule 0     warfarin (COUMADIN) 1 MG tablet Half tablet on Fri 30 tablet      warfarin (COUMADIN)  "1 MG tablet Mon, Tues, Wed, Thurs, Sat, Sun 30 tablet      famotidine (PEPCID) 20 MG tablet Take 1 tablet (20 mg) by mouth 2 times daily 60 tablet 1     carvedilol (COREG) 3.125 MG tablet Take 1 tablet (3.125 mg) by mouth 2 times daily (with meals) 60 tablet      amiodarone (PACERONE/CODARONE) 200 MG tablet Take 1 tablet (200 mg) by mouth daily 60 tablet      spironolactone (ALDACTONE) 25 MG tablet Take 0.5 tablets (12.5 mg) by mouth daily 30 tablet 1     atorvastatin (LIPITOR) 20 MG tablet Take 1 tablet (20 mg) by mouth daily 90 tablet 1     furosemide (LASIX) 20 MG tablet Take 20 mg by mouth Takes every other day 60 tablet 1     nitroglycerin (NITROSTAT) 0.4 MG SL tablet Place 1 tablet under the tongue every 5 minutes as needed for chest pain. 25 tablet 0     aspirin 81 MG tablet Take 1 tablet by mouth daily.  3         Reviewed and updated as needed this visit by clinical staff       Reviewed and updated as needed this visit by Provider         ROS:  CONSTITUTIONAL:NEGATIVE for fever, chills, change in weight  MUSCULOSKELETAL: see below  PSYCHIATRIC: NEGATIVE for changes in mood or affect    Mandarin  on phone.    Thumb: 4/20, fell. Hurt. Then was swollen. Did try heat/ice. Swelling gone.  Later went to [place] to travel 4/28. X 6 weeks.  Came back 6/9.  Still with pain. ? Fracture. Left.    When returned from [place], saw Cardiologist.  Due to some chest pain in [place].  Some shortness of breath.    Recently had pacemaker check.  Fibrillation.     Swelling of leg and feet.     Had some Tightness of chest. Short of breath.   Weakness.  Fatigued.    Falls easily.   Has happened several times.       Low TSH    OBJECTIVE:     /60 (BP Location: Right arm, Cuff Size: Adult Regular)  Pulse 80  Temp 97.9  F (36.6  C) (Oral)  Resp 16  Ht 5' 5.25\" (1.657 m)  Wt 109 lb 12.8 oz (49.8 kg)  SpO2 99%  BMI 18.13 kg/m2  Body mass index is 18.13 kg/(m^2).  GENERAL APPEARANCE: alert and no distress  RESP: " lungs clear to auscultation - no rales, rhonchi or wheezes  CV: regular rates and rhythm  MS: tender at MCP left thumb. No swelling or deformity.  PSYCH: mentation appears normal and affect normal/bright    Pain with palpation of IP.   Also hurts with manipulation.    Reviewed on Care Everywhere.  Cardiology eval and pacemaker interrogation.    Labs. Low TSH    ECHO 4/2017:  Final Impressions:   1. The aortic valve is an abnormally functioning mechanical [facility] Armen AVR, severe stenosis and no regurgitation. The aortic valve peak velocity is 3.59 m/s, the peak gradient is 51.6 mmHg, and the mean gradient is 32.8 mmHg.   2. Right ventricular cavity size is normal, global systolic RV function is mildly reduced.   3. The mitral valve is a normally functioning mechanical [facility] Armen MVR, trace mitral regurgitation.   4. Mild-moderate tricuspid regurgitation.   5. Severely enlarged left atrium.   6. Severely enlarged right atrium.   7. The inferior vena cava is dilated, respiratory size variation greater than 50%.   8. The ascending aorta is dilated with a maximal diameter of 4.1 cm.   9. Normal LV size, borderline wall thickness, mildly reduced global systolic function with an estimated EF of 50 - 55%.  10. Basal inferior segment and basal septum segment are abnormal.  X ray (preliminary reading) a small fracture at the left thumb MCP    ASSESSMENT/PLAN:     Thumb injury, left, initial encounter      Thumb pain, left  Referring to Ortho. Does appear to have a fracture. It is several months old now.   - XR Finger Left G/E 2 Views; Future  - ORTHO  REFERRAL    Low TSH level  Suspect followed by Cardiology due to amiodarone.  Recommend they see if Cardiology has recommendations. If deferred to us, would have them visit with Endocrinology.    Edema, unspecified type  This was more during travel. Improved at home.     Chronic atrial fibrillation (H)  As per Cardiology    Chronic heart failure, unspecified heart failure  type (H)  As per Cardiology. Has had recent visti.        Patient Instructions   Your TSH is low. It was back in April as well.  We can see what the Cardiologist recommends.       Prisca Lucero MD, MD  [facility]

## 2017-07-19 ENCOUNTER — TRANSFERRED RECORDS (OUTPATIENT)
Dept: HEALTH INFORMATION MANAGEMENT | Facility: CLINIC | Age: 72
End: 2017-07-19

## 2017-07-31 ENCOUNTER — TELEPHONE (OUTPATIENT)
Dept: ORTHOPEDICS | Facility: CLINIC | Age: 72
End: 2017-07-31

## 2017-07-31 ENCOUNTER — OFFICE VISIT (OUTPATIENT)
Dept: ORTHOPEDICS | Facility: CLINIC | Age: 72
End: 2017-07-31
Payer: COMMERCIAL

## 2017-07-31 VITALS
BODY MASS INDEX: 18.16 KG/M2 | WEIGHT: 109 LBS | SYSTOLIC BLOOD PRESSURE: 114 MMHG | DIASTOLIC BLOOD PRESSURE: 62 MMHG | HEIGHT: 65 IN

## 2017-07-31 DIAGNOSIS — S63.642A SKIER'S THUMB, LEFT, INITIAL ENCOUNTER: Primary | ICD-10-CM

## 2017-07-31 PROCEDURE — 99203 OFFICE O/P NEW LOW 30 MIN: CPT | Performed by: ORTHOPAEDIC SURGERY

## 2017-07-31 NOTE — LETTER
7/31/2017         RE: Devyn Link  [address] [place] [address]        Dear Colleague,    Thank you for referring your patient, Devyn Link, to the [facility] ORTHOPEDIC SURGERY. Please see a copy of my visit note below.    HISTORY OF PRESENT ILLNESS:    Devyn Link is a 71 year old female who is seen in consultation at the request of Dr. Lucero for left thumb pain    Present symptoms: Pt injured her thumb about 3 months ago, during late April, does not recall exact date.  Pt states she fell from a bed, reached out with the left hand as she landed.  Pt states pain is at the MCP joint of the thumb.  Pt states initially the entire thumb hurt.  Pt states initially she had a lot of swelling as well.  Pt states thumb continues to hurt all the time.    Treatments tried to this point: ice, heat  Orthopedic PMH: left wrist ORIF - 27 years ago.     Past Medical History:   Diagnosis Date     Hypertension      Myocardial infarction (H)      Pacemaker        Past Surgical History:   Procedure Laterality Date     C REPLACEMENT OF MITRAL VALVE  1994    Done at [facility]     HEART CATH DRUG ELUTING STENT PLACEMENT  2004/2007    seeing Cardiologist at [facility]     SURGICAL HISTORY OF -   ~ 1995    mechanical aortic valve     SURGICAL HISTORY OF -       pacemaker     SURGICAL HISTORY OF -       mechanical mitral valve       Family History   Problem Relation Age of Onset     Hypertension Mother      CANCER Mother      lung     HEART DISEASE Mother      CEREBROVASCULAR DISEASE Mother      Hypertension Sister        Social History     Social History     Marital status:      Spouse name: N/A     Number of children: N/A     Years of education: N/A     Occupational History     Not on file.     Social History Main Topics     Smoking status: Never Smoker     Smokeless tobacco: Never Used     Alcohol use No     Drug use: No     Sexual activity: Yes     Partners: Male     Other Topics Concern      Parent/Sibling W/ Cabg, Mi Or Angioplasty Before 65f 55m? Yes     Social History Narrative       Current Outpatient Prescriptions   Medication Sig Dispense Refill     losartan (COZAAR) 25 MG tablet Take 25 mg by mouth daily       warfarin (COUMADIN) 1 MG tablet Mon, Tues, Wed, Thurs, Sat, Sun 30 tablet      famotidine (PEPCID) 20 MG tablet Take 1 tablet (20 mg) by mouth 2 times daily 60 tablet 1     carvedilol (COREG) 3.125 MG tablet Take 1 tablet (3.125 mg) by mouth 2 times daily (with meals) 60 tablet      amiodarone (PACERONE/CODARONE) 200 MG tablet Take 1 tablet (200 mg) by mouth daily 60 tablet      spironolactone (ALDACTONE) 25 MG tablet Take 0.5 tablets (12.5 mg) by mouth daily 30 tablet 1     atorvastatin (LIPITOR) 20 MG tablet Take 1 tablet (20 mg) by mouth daily 90 tablet 1     furosemide (LASIX) 20 MG tablet Take 20 mg by mouth Takes every other day 60 tablet 1     aspirin 81 MG tablet Take 1 tablet by mouth daily.  3     desonide (DESOWEN) 0.05 % cream Apply sparingly to affected area three times daily for 14 days. (Patient not taking: Reported on 7/31/2017) 15 g 0     ketoconazole (NIZORAL) 2 % cream Apply topically daily       amoxicillin (AMOXIL) 500 MG capsule Take 4 capsules (2,000 mg) by mouth daily Take 4 cap--200mg-- 30-60 minutes before procedure (Patient not taking: Reported on 7/31/2017) 4 capsule 0     warfarin (COUMADIN) 1 MG tablet Half tablet on Fri 30 tablet      nitroglycerin (NITROSTAT) 0.4 MG SL tablet Place 1 tablet under the tongue every 5 minutes as needed for chest pain. (Patient not taking: Reported on 7/31/2017) 25 tablet 0       Allergies   Allergen Reactions     No Known Allergies        REVIEW OF SYSTEMS:  CONSTITUTIONAL:  NEGATIVE for fever, chills, change in weight  INTEGUMENTARY/SKIN:  NEGATIVE for worrisome rashes, moles or lesions  EYES:  NEGATIVE for vision changes or irritation  ENT/MOUTH:  NEGATIVE for ear, mouth and throat problems  RESP:  NEGATIVE for significant  "cough or SOB  BREAST:  NEGATIVE for masses, tenderness or discharge  CV:  Hypertension, heart failure, arrhythmia   GI:  NEGATIVE for nausea, abdominal pain, heartburn, or change in bowel habits  :  Negative   MUSCULOSKELETAL:  See HPI above  NEURO:  NEGATIVE for weakness, dizziness or paresthesias  ENDOCRINE:  NEGATIVE for temperature intolerance, skin/hair changes  HEME/ALLERGY/IMMUNE:  NEGATIVE for bleeding problems  PSYCHIATRIC:  NEGATIVE for changes in mood or affect      PHYSICAL EXAM:  /62 (BP Location: Right arm, Patient Position: Sitting, Cuff Size: Adult Small)  Ht 5' 5.25\" (1.657 m)  Wt 109 lb (49.4 kg)  BMI 18 kg/m2  Body mass index is 18 kg/(m^2).   GENERAL APPEARANCE: healthy, alert and no distress   HEENT: No apparent thyroid megaly. Clear sclera with normal ocular movement  RESPIRATORY: No labored breathing  SKIN: no suspicious lesions or rashes  NEURO: Normal strength and tone, mentation intact and speech normal  VASCULAR: Good pulses, and capillary refill   LYMPH: no lymphadenopathy   PSYCH:  mentation appears normal and affect normal/bright    MUSCULOSKELETAL:  Subtle enlargement of the MCP joint of the left thumb noted  Palpable tenderness along the ulnar collateral ligament, left thumb  Abduction at the MCP joint increases the pain  She has full range of motion of the thumb otherwise  Further opening of the ulnar side of the MCP joints with a stress of the left thumb compared to the right is noted  Gross sensations within normal limits  No other pathology noted     ASSESSMENT:  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  A Chinese  was present.  The options of further observation versus surgical intervention of repair of the ulnar collateral ligament were explained.  Obviously, the " complicating factor is the fact that she is on Coumadin at the moment. Most likely she will need a bridging Lovenox but we will defer that to Dr. Lucero or her cardiologist.   We also explained to her that after surgery she'll be immobilized for 4-6 weeks.  Potential risks of infection and failure to heal  The avulsed fragment were explained.  She'll be set up for the surgery sometime in the future.    Imaging Interpretation:   None taken today but visualize x-rays of July 18, 2017    Jimmie Davila MD  Department of Orthopedic Surgery        Disclaimer: This note consists of symbols derived from keyboarding, dictation and/or voice recognition software. As a result, there may be errors in the script that have gone undetected. Please consider this when interpreting information found in this chart.      Again, thank you for allowing me to participate in the care of your patient.        Sincerely,        Jimmie Davila MD

## 2017-07-31 NOTE — PROGRESS NOTES
HISTORY OF PRESENT ILLNESS:    Devyn Link is a 71 year old female who is seen in consultation at the request of Dr. Lucero for left thumb pain    Present symptoms: Pt injured her thumb about 3 months ago, during late April, does not recall exact date.  Pt states she fell from a bed, reached out with the left hand as she landed.  Pt states pain is at the MCP joint of the thumb.  Pt states initially the entire thumb hurt.  Pt states initially she had a lot of swelling as well.  Pt states thumb continues to hurt all the time.    Treatments tried to this point: ice, heat  Orthopedic PMH: left wrist ORIF - 27 years ago.     Past Medical History:   Diagnosis Date     Hypertension      Myocardial infarction (H)      Pacemaker        Past Surgical History:   Procedure Laterality Date     C REPLACEMENT OF MITRAL VALVE  1994    Done at [facility]     HEART CATH DRUG ELUTING STENT PLACEMENT  2004/2007    seeing Cardiologist at [facility]     SURGICAL HISTORY OF -   ~ 1995    mechanical aortic valve     SURGICAL HISTORY OF -       pacemaker     SURGICAL HISTORY OF -       mechanical mitral valve       Family History   Problem Relation Age of Onset     Hypertension Mother      CANCER Mother      lung     HEART DISEASE Mother      CEREBROVASCULAR DISEASE Mother      Hypertension Sister        Social History     Social History     Marital status:      Spouse name: N/A     Number of children: N/A     Years of education: N/A     Occupational History     Not on file.     Social History Main Topics     Smoking status: Never Smoker     Smokeless tobacco: Never Used     Alcohol use No     Drug use: No     Sexual activity: Yes     Partners: Male     Other Topics Concern     Parent/Sibling W/ Cabg, Mi Or Angioplasty Before 65f 55m? Yes     Social History Narrative       Current Outpatient Prescriptions   Medication Sig Dispense Refill     losartan (COZAAR) 25 MG tablet Take 25 mg by mouth daily       warfarin (COUMADIN) 1 MG  tablet Mon, Tues, Wed, Thurs, Sat, Sun 30 tablet      famotidine (PEPCID) 20 MG tablet Take 1 tablet (20 mg) by mouth 2 times daily 60 tablet 1     carvedilol (COREG) 3.125 MG tablet Take 1 tablet (3.125 mg) by mouth 2 times daily (with meals) 60 tablet      amiodarone (PACERONE/CODARONE) 200 MG tablet Take 1 tablet (200 mg) by mouth daily 60 tablet      spironolactone (ALDACTONE) 25 MG tablet Take 0.5 tablets (12.5 mg) by mouth daily 30 tablet 1     atorvastatin (LIPITOR) 20 MG tablet Take 1 tablet (20 mg) by mouth daily 90 tablet 1     furosemide (LASIX) 20 MG tablet Take 20 mg by mouth Takes every other day 60 tablet 1     aspirin 81 MG tablet Take 1 tablet by mouth daily.  3     desonide (DESOWEN) 0.05 % cream Apply sparingly to affected area three times daily for 14 days. (Patient not taking: Reported on 7/31/2017) 15 g 0     ketoconazole (NIZORAL) 2 % cream Apply topically daily       amoxicillin (AMOXIL) 500 MG capsule Take 4 capsules (2,000 mg) by mouth daily Take 4 cap--200mg-- 30-60 minutes before procedure (Patient not taking: Reported on 7/31/2017) 4 capsule 0     warfarin (COUMADIN) 1 MG tablet Half tablet on Fri 30 tablet      nitroglycerin (NITROSTAT) 0.4 MG SL tablet Place 1 tablet under the tongue every 5 minutes as needed for chest pain. (Patient not taking: Reported on 7/31/2017) 25 tablet 0       Allergies   Allergen Reactions     No Known Allergies        REVIEW OF SYSTEMS:  CONSTITUTIONAL:  NEGATIVE for fever, chills, change in weight  INTEGUMENTARY/SKIN:  NEGATIVE for worrisome rashes, moles or lesions  EYES:  NEGATIVE for vision changes or irritation  ENT/MOUTH:  NEGATIVE for ear, mouth and throat problems  RESP:  NEGATIVE for significant cough or SOB  BREAST:  NEGATIVE for masses, tenderness or discharge  CV:  Hypertension, heart failure, arrhythmia   GI:  NEGATIVE for nausea, abdominal pain, heartburn, or change in bowel habits  :  Negative   MUSCULOSKELETAL:  See HPI above  NEURO:   "NEGATIVE for weakness, dizziness or paresthesias  ENDOCRINE:  NEGATIVE for temperature intolerance, skin/hair changes  HEME/ALLERGY/IMMUNE:  NEGATIVE for bleeding problems  PSYCHIATRIC:  NEGATIVE for changes in mood or affect      PHYSICAL EXAM:  /62 (BP Location: Right arm, Patient Position: Sitting, Cuff Size: Adult Small)  Ht 5' 5.25\" (1.657 m)  Wt 109 lb (49.4 kg)  BMI 18 kg/m2  Body mass index is 18 kg/(m^2).   GENERAL APPEARANCE: healthy, alert and no distress   HEENT: No apparent thyroid megaly. Clear sclera with normal ocular movement  RESPIRATORY: No labored breathing  SKIN: no suspicious lesions or rashes  NEURO: Normal strength and tone, mentation intact and speech normal  VASCULAR: Good pulses, and capillary refill   LYMPH: no lymphadenopathy   PSYCH:  mentation appears normal and affect normal/bright    MUSCULOSKELETAL:  Subtle enlargement of the MCP joint of the left thumb noted  Palpable tenderness along the ulnar collateral ligament, left thumb  Abduction at the MCP joint increases the pain  She has full range of motion of the thumb otherwise  Further opening of the ulnar side of the MCP joints with a stress of the left thumb compared to the right is noted  Gross sensations within normal limits  No other pathology noted     ASSESSMENT:  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  A Chinese  was present.  The options of further observation versus surgical intervention of repair of the ulnar collateral ligament were explained.  Obviously, the complicating factor is the fact that she is on Coumadin at the moment. Most likely she will need a bridging Lovenox but we will defer that to Dr. Lucero or her cardiologist.   We also explained to her that after surgery she'll be immobilized for 4-6 " weeks.  Potential risks of infection and failure to heal  The avulsed fragment were explained.  She'll be set up for the surgery sometime in the future.    Imaging Interpretation:   None taken today but visualize x-rays of July 18, 2017    Jimmie Davila MD  Department of Orthopedic Surgery        Disclaimer: This note consists of symbols derived from keyboarding, dictation and/or voice recognition software. As a result, there may be errors in the script that have gone undetected. Please consider this when interpreting information found in this chart.

## 2017-07-31 NOTE — NURSING NOTE
"Chief Complaint   Patient presents with     Hand Pain     Left thumb fracture; DOI: ~ 4/22/2017 ~ 3 months ago       Initial /62 (BP Location: Right arm, Patient Position: Sitting, Cuff Size: Adult Small)  Ht 5' 5.25\" (1.657 m)  Wt 109 lb (49.4 kg)  BMI 18 kg/m2 Estimated body mass index is 18 kg/(m^2) as calculated from the following:    Height as of this encounter: 5' 5.25\" (1.657 m).    Weight as of this encounter: 109 lb (49.4 kg).  Medication Reconciliation: complete    "

## 2017-07-31 NOTE — TELEPHONE ENCOUNTER
Scheduled surgery for Repair of thumb ulna collateral ligament repair - **Patient will need  ** on 9/26/2017 with Dr. Davila @ [facility] @ 12:00.  Surgery education packet provided to patient.

## 2017-07-31 NOTE — MR AVS SNAPSHOT
After Visit Summary   7/31/2017    Devyn Link    MRN: [id number]           Patient Information     Date Of Birth          1945        Visit Information        Provider Department      7/31/2017 9:00 AM Jimmie Davila MD; [employer] [facility] ORTHOPEDIC SURGERY        Today's Diagnoses     Skier's thumb, left, initial encounter    -  1       Follow-ups after your visit        Your next 10 appointments already scheduled     Sep 01, 2017 11:10 AM CDT   Pre-Op physical with Prisca Lucero MD   [facility]    [address] [address] [address] [facility] [id number] [phone]            Sep 26, 2017   Procedure with Jimmie Davila MD   [facility] (--)    [address] [facility] [id number] [phone]            Oct 06, 2017 11:00 AM CDT   Return Visit with Larry Delgado PA-C   [facility] ORTHOPEDIC SURGERY ([facility])    [address]  [address]  [facility] [address]   [phone]              Who to contact     If you have questions or need follow up information about today's clinic visit or your schedule please contact [facility] ORTHOPEDIC SURGERY directly at [phone].  Normal or non-critical lab and imaging results will be communicated to you by Fer[employer], letter or phone within 4 business days after the clinic has received the results. If you do not hear from us within 7 days, please contact the clinic through MyChart or phone. If you have a critical or abnormal lab result, we will notify you by phone as soon as possible.  Submit refill requests through [facility] or call your pharmacy and they will forward the refill request to us. Please allow 3 business days for your refill to be completed.          Additional Information About Your Visit        [employer] [facility] Information     [employer] [facility] gives you secure access to your electronic health record. If you see a  "primary care provider, you can also send messages to your care team and make appointments. If you have questions, please call your primary care clinic.  If you do not have a primary care provider, please call [phone] and they will assist you.        Care EveryWhere ID     This is your Care EveryWhere ID. This could be used by other organizations to access your [place] medical records  [id number]        Your Vitals Were     Height BMI (Body Mass Index)                5' 5.25\" (1.657 m) 18 kg/m2           Blood Pressure from Last 3 Encounters:   07/31/17 114/62   07/18/17 112/60   01/03/17 122/72    Weight from Last 3 Encounters:   07/31/17 109 lb (49.4 kg)   07/18/17 109 lb 12.8 oz (49.8 kg)   01/03/17 114 lb 1.6 oz (51.8 kg)              Today, you had the following     No orders found for display       Primary Care Provider Office Phone # Fax #    rPisca Lucero [phone]       [facility] [address] [facility] [address]        Equal Access to Services     [facility] AH: Hadii aad ku hadasho Soomaali, waaxda luqadaha, qaybta kaalmada adeegyada, waxay idiin hayaan adeeg kharagodfrey la'frederickn . So [facility] [phone].    ATENCIÓN: Si habla español, tiene a muir disposición servicios gratuitos de asistencia lingüística. Llame al [phone].    We comply with applicable federal civil rights laws and [place] laws. We do not discriminate on the basis of race, color, national origin, age, disability sex, sexual orientation or gender identity.            Thank you!     Thank you for choosing [facility] ORTHOPEDIC SURGERY  for your care. Our goal is always to provide you with excellent care. Hearing back from our patients is one way we can continue to improve our services. Please take a few minutes to complete the written survey that you may receive in the mail after your visit with us. Thank you!             Your Updated Medication List - Protect others around you: Learn how to " safely use, store and throw away your medicines at www.disposemymeds.org.          This list is accurate as of: 7/31/17 10:10 AM.  Always use your most recent med list.                   Brand Name Dispense Instructions for use Diagnosis    amiodarone 200 MG tablet    PACERONE/CODARONE    60 tablet    Take 1 tablet (200 mg) by mouth daily        amoxicillin 500 MG capsule    AMOXIL    4 capsule    Take 4 capsules (2,000 mg) by mouth daily Take 4 cap--200mg-- 30-60 minutes before procedure    Need for SBE (subacute bacterial endocarditis) prophylaxis       aspirin 81 MG tablet      Take 1 tablet by mouth daily.    Routine general medical examination at a health care facility, CAD (coronary artery disease)       carvedilol 3.125 MG tablet    COREG    60 tablet    Take 1 tablet (3.125 mg) by mouth 2 times daily (with meals)        desonide 0.05 % cream    DESOWEN    15 g    Apply sparingly to affected area three times daily for 14 days.    Localized macular rash       famotidine 20 MG tablet    PEPCID    60 tablet    Take 1 tablet (20 mg) by mouth 2 times daily        ketoconazole 2 % cream    NIZORAL     Apply topically daily        LASIX 20 MG tablet   Generic drug:  furosemide     60 tablet    Take 20 mg by mouth Takes every other day    Heart failure (H), Hypertension goal BP (blood pressure) < 140/90       LIPITOR 20 MG tablet   Generic drug:  atorvastatin     90 tablet    Take 1 tablet (20 mg) by mouth daily        losartan 25 MG tablet    COZAAR     Take 25 mg by mouth daily        nitroGLYcerin 0.4 MG sublingual tablet    NITROSTAT    25 tablet    Place 1 tablet under the tongue every 5 minutes as needed for chest pain.    CAD (coronary artery disease)       spironolactone 25 MG tablet    ALDACTONE    30 tablet    Take 0.5 tablets (12.5 mg) by mouth daily        * warfarin 1 MG tablet    COUMADIN    30 tablet    Half tablet on Fri        * warfarin 1 MG tablet    COUMADIN    30 tablet    Mon, Tues, Wed, Thurs,  Tanesha Clay    Heart failure, unspecified heart failure chronicity, unspecified heart failure type (H)       * Notice:  This list has 2 medication(s) that are the same as other medications prescribed for you. Read the directions carefully, and ask your doctor or other care provider to review them with you.

## 2017-08-01 ENCOUNTER — TELEPHONE (OUTPATIENT)
Dept: ORTHOPEDICS | Facility: CLINIC | Age: 72
End: 2017-08-01

## 2017-08-01 NOTE — TELEPHONE ENCOUNTER
Patient called and cancelled their surgery on 9/26/2017 with Dr. Davila @ [facility].  The family changed their minds.

## 2017-09-01 ENCOUNTER — OFFICE VISIT (OUTPATIENT)
Dept: FAMILY MEDICINE | Facility: CLINIC | Age: 72
End: 2017-09-01
Payer: COMMERCIAL

## 2017-09-01 VITALS
HEIGHT: 65 IN | BODY MASS INDEX: 18.18 KG/M2 | OXYGEN SATURATION: 97 % | DIASTOLIC BLOOD PRESSURE: 62 MMHG | HEART RATE: 62 BPM | TEMPERATURE: 97.7 F | WEIGHT: 109.1 LBS | SYSTOLIC BLOOD PRESSURE: 118 MMHG

## 2017-09-01 DIAGNOSIS — I25.10 CORONARY ARTERY DISEASE INVOLVING NATIVE CORONARY ARTERY OF NATIVE HEART WITHOUT ANGINA PECTORIS: ICD-10-CM

## 2017-09-01 DIAGNOSIS — R73.09 ABNORMAL GLUCOSE: ICD-10-CM

## 2017-09-01 DIAGNOSIS — Z13.820 SCREENING FOR OSTEOPOROSIS: ICD-10-CM

## 2017-09-01 DIAGNOSIS — Z95.2 MECHANICAL HEART VALVE PRESENT: ICD-10-CM

## 2017-09-01 DIAGNOSIS — Z12.11 SPECIAL SCREENING FOR MALIGNANT NEOPLASMS, COLON: ICD-10-CM

## 2017-09-01 DIAGNOSIS — R79.89 LOW TSH LEVEL: ICD-10-CM

## 2017-09-01 DIAGNOSIS — I48.20 CHRONIC ATRIAL FIBRILLATION (H): ICD-10-CM

## 2017-09-01 DIAGNOSIS — Z78.0 MENOPAUSE: ICD-10-CM

## 2017-09-01 DIAGNOSIS — I10 HYPERTENSION GOAL BP (BLOOD PRESSURE) < 140/90: ICD-10-CM

## 2017-09-01 DIAGNOSIS — Z95.0 PACEMAKER: ICD-10-CM

## 2017-09-01 DIAGNOSIS — Z00.00 ENCOUNTER FOR ROUTINE ADULT HEALTH EXAMINATION WITHOUT ABNORMAL FINDINGS: Primary | ICD-10-CM

## 2017-09-01 DIAGNOSIS — Z12.31 ENCOUNTER FOR SCREENING MAMMOGRAM FOR BREAST CANCER: ICD-10-CM

## 2017-09-01 DIAGNOSIS — E78.5 HYPERLIPIDEMIA LDL GOAL <70: ICD-10-CM

## 2017-09-01 LAB — HBA1C MFR BLD: 6.2 % (ref 4.3–6)

## 2017-09-01 PROCEDURE — 80053 COMPREHEN METABOLIC PANEL: CPT | Performed by: FAMILY MEDICINE

## 2017-09-01 PROCEDURE — G0439 PPPS, SUBSEQ VISIT: HCPCS | Performed by: FAMILY MEDICINE

## 2017-09-01 PROCEDURE — 83036 HEMOGLOBIN GLYCOSYLATED A1C: CPT | Performed by: FAMILY MEDICINE

## 2017-09-01 PROCEDURE — 36415 COLL VENOUS BLD VENIPUNCTURE: CPT | Performed by: FAMILY MEDICINE

## 2017-09-01 PROCEDURE — 80061 LIPID PANEL: CPT | Performed by: FAMILY MEDICINE

## 2017-09-01 NOTE — PROGRESS NOTES
SUBJECTIVE:   Devyn Link is a 71 year old female who presents for Preventive Visit.      Are you in the first 12 months of your Medicare Part B coverage?  No    Healthy Habits:    Do you get at least three servings of calcium containing foods daily (dairy, green leafy vegetables, etc.)? yes    Amount of exercise or daily activities, outside of work: none    Problems taking medications regularly Yes -amiodarone    Medication side effects: Yes Effecting the thyroid    Have you had an eye exam in the past two years? yes    Do you see a dentist twice per year? no    Do you have sleep apnea, excessive snoring or daytime drowsiness?yes    COGNITIVE SCREEN  1) Repeat 3 items (Banana, Sunrise, Chair)    2) Clock draw: ABNORMAL   3) 3 item recall: Recalls 2 objects   Results: ABNORMAL clock, 1-2 items recalled: PROBABLE COGNITIVE IMPAIRMENT, **INFORM PROVIDER**    Mini-CogTM Copyright S Dov. Licensed by the author for use in [facility]; reprinted with permission ([email or web address]). All rights reserved.        Reviewed and updated as needed this visit by clinical staffTobacco  Allergies  Med Hx  Surg Hx  Fam Hx  Soc Hx        Reviewed and updated as needed this visit by Provider        Social History   Substance Use Topics     Smoking status: Never Smoker     Smokeless tobacco: Never Used     Alcohol use No       The patient does not drink >3 drinks per day nor >7 drinks per week.    Today's PHQ-2 Score:   PHQ-2 ( 1999 Pfizer) 1/3/2017 8/30/2016   Q1: Little interest or pleasure in doing things 0 0   Q2: Feeling down, depressed or hopeless 0 0   PHQ-2 Score 0 0         Do you feel safe in your environment - Yes    Do you have a Health Care Directive?: No: Advance care planning was reviewed with patient; gave pt information      Current providers sharing in care for this patient include: Patient Care Team:  Prisca Lucero MD as PCP - General (Family Practice)      Hearing impairment: No    Ability to  successfully perform activities of daily living: Yes, no assistance needed     Fall risk:         Home safety:  none identified      The following health maintenance items are reviewed in Epic and correct as of today:Health Maintenance   Topic Date Due     COLON CANCER SCREEN (SYSTEM ASSIGNED)  1993     BMP Q6 MOS  2016     ALT Q1 YR  2016     LIPID MONITORING Q1 YEAR  2017     CBC Q1 YR  2017     FALL RISK ASSESSMENT  2017     INFLUENZA VACCINE (SYSTEM ASSIGNED)  2017     ADVANCE DIRECTIVE PLANNING Q5 YRS  2018     MAMMO SCREEN Q2 YR (SYSTEM ASSIGNED)  2018     HF ACTION PLAN Q3 YR  2020     TETANUS IMMUNIZATION (SYSTEM ASSIGNED)  2024     DEXA SCAN SCREENING (SYSTEM ASSIGNED)  Completed     PNEUMOCOCCAL  Completed     Patient Active Problem List   Diagnosis     Hyperlipidemia LDL goal <70     CAD (coronary artery disease)     Long term current use of anticoagulant     Hypertension goal BP (blood pressure) < 140/90     Advanced directives, counseling/discussion     Bradycardia     Health Care Home     Chronic atrial fibrillation (H)     Heart failure (H)     Anemia     Pacemaker     Aortic valve replaced     Mitral valve replaced     Abnormal glucose     Prediabetes       Past Medical History:   Diagnosis Date     Hypertension      Myocardial infarction (H)      Pacemaker        Past Surgical History:   Procedure Laterality Date     C REPLACEMENT OF MITRAL VALVE      Done at [facility]     HEART CATH DRUG ELUTING STENT PLACEMENT      seeing Cardiologist at [facility]     SURGICAL HISTORY OF -   ~     mechanical aortic valve     SURGICAL HISTORY OF -       pacemaker     SURGICAL HISTORY OF -       mechanical mitral valve       Obstetric History       T1      L1     SAB0   TAB0   Ectopic0   Multiple0   Live Births0       # Outcome Date GA Lbr Velasquez/2nd Weight Sex Delivery Anes PTL Lv   1 Term                    Current Outpatient Prescriptions   Medication Sig Dispense Refill     desonide (DESOWEN) 0.05 % cream Apply sparingly to affected area three times daily for 14 days. 15 g 0     losartan (COZAAR) 25 MG tablet Take 25 mg by mouth daily       ketoconazole (NIZORAL) 2 % cream Apply topically daily       amoxicillin (AMOXIL) 500 MG capsule Take 4 capsules (2,000 mg) by mouth daily Take 4 cap--200mg-- 30-60 minutes before procedure 4 capsule 0     warfarin (COUMADIN) 1 MG tablet Half tablet on Fri 30 tablet      warfarin (COUMADIN) 1 MG tablet Mon, Tues, Wed, Thurs, Sat, Sun 30 tablet      famotidine (PEPCID) 20 MG tablet Take 1 tablet (20 mg) by mouth 2 times daily 60 tablet 1     carvedilol (COREG) 3.125 MG tablet Take 1 tablet (3.125 mg) by mouth 2 times daily (with meals) 60 tablet      spironolactone (ALDACTONE) 25 MG tablet Take 0.5 tablets (12.5 mg) by mouth daily 30 tablet 1     atorvastatin (LIPITOR) 20 MG tablet Take 1 tablet (20 mg) by mouth daily 90 tablet 1     furosemide (LASIX) 20 MG tablet Take 20 mg by mouth Takes every other day 60 tablet 1     nitroglycerin (NITROSTAT) 0.4 MG SL tablet Place 1 tablet under the tongue every 5 minutes as needed for chest pain. 25 tablet 0     aspirin 81 MG tablet Take 1 tablet by mouth daily.  3       Family History   Problem Relation Age of Onset     Hypertension Mother      CANCER Mother      lung     HEART DISEASE Mother      CEREBROVASCULAR DISEASE Mother      Hypertension Sister        Social History   Substance Use Topics     Smoking status: Never Smoker     Smokeless tobacco: Never Used     Alcohol use No       Immunization History   Administered Date(s) Administered     Influenza (IIV3) 12/13/2005, 11/07/2006, 11/13/2007, 10/28/2008, 10/01/2012, 10/15/2014     Influenza Vaccine IM 3yrs+ 4 Valent IIV4 10/28/2013     Pneumococcal (PCV 13) 08/05/2015     Pneumococcal 23 valent 04/16/2014     TD (ADULT, 7+) 04/05/2004     TDAP Vaccine (Adacel) 04/16/2014  "        ROS:  Here with  and .    C: NEGATIVE for fever, chills, change in weight  E/M: NEGATIVE for ear, mouth and throat problems  R: NEGATIVE for significant cough or SOB  CV: NEGATIVE for chest pain, palpitations or peripheral edema x sometimes will have palpitations and sometimes bloated. Pacemaker did  a couple things.   Legs swollen in [place]; OK now.  No nausea, vomitting or change in bowel habits. X occasional nausea.  No urinary symptoms.      Some problems with amiodarone. There was a blood test.  So stopped this yesterday. No alternative yet. They will see Cardiologist back for recheck.       OBJECTIVE:   /62 (Patient Position: Chair, Cuff Size: Adult Regular)  Pulse 62  Temp 97.7  F (36.5  C) (Oral)  Ht 5' 5.25\" (1.657 m)  Wt 109 lb 1.6 oz (49.5 kg)  SpO2 97%  BMI 18.02 kg/m2 Estimated body mass index is 18.02 kg/(m^2) as calculated from the following:    Height as of this encounter: 5' 5.25\" (1.657 m).    Weight as of this encounter: 109 lb 1.6 oz (49.5 kg).  EXAM:   GENERAL APPEARANCE: alert and no distress  EYES: Eyes grossly normal to inspection, PERRL and conjunctivae and sclerae normal  HENT: ear canals and TM's normal, nose and mouth without ulcers or lesions, oropharynx clear and oral mucous membranes moist  NECK: no adenopathy, no asymmetry, masses, or scars and thyroid normal to palpation  RESP: lungs clear to auscultation - no rales, rhonchi or wheezes  BREAST: normal without masses, tenderness or nipple discharge and no palpable axillary masses or adenopathy  CV: regular rates and rhythm. Loud valve sounds (mechanical). Grade III/VI systolic murmur; no peripheral edema and peripheral pulses strong  ABDOMEN: soft, nontender, no hepatosplenomegaly, no masses and bowel sounds normal  MS: no musculoskeletal defects are noted and gait is age appropriate without ataxia  SKIN: no suspicious lesions or rashes  NEURO: Normal strength and tone, sensory exam grossly " normal, mentation intact and speech normal  PSYCH: mentation appears normal and affect normal/bright    TSH   Date Value Ref Range Status   03/02/2016 1.07 mcU/mL Final   ]  Lab Results   Component Value Date    A1C 6.2 08/06/2015     Reviewed in Care Everywhere. TSH has been low recently.  BMP noted.  No recent lipid or liver test     ASSESSMENT / PLAN:     Encounter for routine adult health examination without abnormal findings      Hypertension goal BP (blood pressure) < 140/90  Satisfactory.  - Comprehensive metabolic panel    Hyperlipidemia LDL goal <70  Has not had lipids in awhile. She is fasting.   - Lipid Profile (Chol, Trig, HDL, LDL calc)  - Comprehensive metabolic panel    Menopause    - DX Hip/Pelvis/Spine; Future    Mechanical heart valve present  Both aortic and mitral.  She sees Cardiologist at [facility]. Can see notes on Care Everywhere.     Low TSH level  Thought due to amiodarone.  This was just stopped yesterday.   Reviewed in Care Everywhere; does look like she is being seen by Endocrinologist there.    Pacemaker  In place. Monitored through Cardiology.    Chronic atrial fibrillation (H)  Amiodarone just stopped. No alternative at this time. She is following with Cardiology.  She does have a pacemaker.  She is anticoagulated.    Coronary artery disease involving native coronary artery of native heart without angina pectoris  She is on statin. Followed by Cardiology.    Screening for osteoporosis    - DX Hip/Pelvis/Spine; Future    Abnormal glucose  Continue to monitor. Healthy nutrition. She certainly does not need to lose weight.   - Comprehensive metabolic panel  - Hemoglobin A1c    Special screening for malignant neoplasms, colon    - Fecal colorectal cancer screen (FIT); Future    Encounter for screening mammogram for breast cancer  - MA Screening Digital Bilateral; Future      End of Life Planning:  Patient currently has an advanced directive: No.  I have verified the patient's ablity to  "prepare an advanced directive/make health care decisions.  Literature was provided to assist patient in preparing an advanced directive.    COUNSELING:  Reviewed preventive health counseling, as reflected in patient instructions       Regular exercise       Healthy diet/nutrition       Vision screening       Dental care       Osteoporosis Prevention/Bone Health        Estimated body mass index is 18.02 kg/(m^2) as calculated from the following:    Height as of this encounter: 5' 5.25\" (1.657 m).    Weight as of this encounter: 109 lb 1.6 oz (49.5 kg).  Weight management plan noted, stable and monitoring   reports that she has never smoked. She has never used smokeless tobacco.        Appropriate preventive services were discussed with this patient, including applicable screening as appropriate for cardiovascular disease, diabetes, osteopenia/osteoporosis, and glaucoma.  As appropriate for age/gender, discussed screening for colorectal cancer, prostate cancer, breast cancer, and cervical cancer. Checklist reviewing preventive services available has been given to the patient.    Reviewed patients plan of care and provided an AVS. The Basic Care Plan (routine screening as documented in Health Maintenance) for Devyn meets the Care Plan requirement. This Care Plan has been established and reviewed with the Patient and spouse.    Counseling Resources:  ATP IV Guidelines  Pooled Cohorts Equation Calculator  Breast Cancer Risk Calculator  FRAX Risk Assessment  ICSI Preventive Guidelines  Dietary Guidelines for Americans, 2010  USDA's MyPlate  ASA Prophylaxis  Lung CA Screening    Prisca Lucero MD, MD  [facility]  "

## 2017-09-01 NOTE — NURSING NOTE
"Chief Complaint   Patient presents with     Physical       Initial /62 (Patient Position: Chair, Cuff Size: Adult Regular)  Pulse 62  Temp 97.7  F (36.5  C) (Oral)  Ht 5' 5.25\" (1.657 m)  Wt 109 lb 1.6 oz (49.5 kg)  SpO2 97%  BMI 18.02 kg/m2 Estimated body mass index is 18.02 kg/(m^2) as calculated from the following:    Height as of this encounter: 5' 5.25\" (1.657 m).    Weight as of this encounter: 109 lb 1.6 oz (49.5 kg).  Medication Reconciliation: complete Dianne Turcios, CHRISTOPH    "

## 2017-09-01 NOTE — MR AVS SNAPSHOT
After Visit Summary   9/1/2017    Devyn Link    MRN: [id number]           Patient Information     Date Of Birth          1945        Visit Information        Provider Department      9/1/2017 11:00 AM Prisca Lucero MD; [place] [employer] [facility]        Today's Diagnoses     Abnormal glucose    -  1    Hypertension goal BP (blood pressure) < 140/90        Hyperlipidemia LDL goal <70        Special screening for malignant neoplasms, colon        Encounter for screening mammogram for breast cancer          Care Instructions      Preventive Health Recommendations    Female Ages 65 +    Yearly exam:     See your health care provider every year in order to  o Review health changes.   o Discuss preventive care.    o Review your medicines if your doctor has prescribed any.      You no longer need a yearly Pap test unless you've had an abnormal Pap test in the past 10 years. If you have vaginal symptoms, such as bleeding or discharge, be sure to talk with your provider about a Pap test.      Every 1 to 2 years, have a mammogram.  If you are over 69, talk with your health care provider about whether or not you want to continue having screening mammograms.      Every 10 years, have a colonoscopy. Or, have a yearly FIT test (stool test). These exams will check for colon cancer.       Have a cholesterol test every 5 years, or more often if your doctor advises it.       Have a diabetes test (fasting glucose) every three years. If you are at risk for diabetes, you should have this test more often.       At age 65, have a bone density scan (DEXA) to check for osteoporosis (brittle bone disease).    Shots:    Get a flu shot each year.    Get a tetanus shot every 10 years.    Talk to your doctor about your pneumonia vaccines. There are now two you should receive - Pneumovax (PPSV 23) and Prevnar (PCV 13).    Talk to your doctor about the shingles vaccine.    Talk to your doctor about  the hepatitis B vaccine.    Nutrition:     Eat at least 5 servings of fruits and vegetables each day.      Eat whole-grain bread, whole-wheat pasta and brown rice instead of white grains and rice.      Talk to your provider about Calcium and Vitamin D.     Lifestyle    Exercise at least 150 minutes a week (30 minutes a day, 5 days a week). This will help you control your weight and prevent disease.      Limit alcohol to one drink per day.      No smoking.       Wear sunscreen to prevent skin cancer.       See your dentist twice a year for an exam and cleaning.      See your eye doctor every 1 to 2 years to screen for conditions such as glaucoma, macular degeneration and cataracts.    ----------------------------------      Please call [phone] to schedule your mammogram at the [place] location of your choice.            Follow-ups after your visit        Your next 10 appointments already scheduled     Sep 01, 2017 11:00 AM CDT   PH[facility] with Prisc[facility]ero MD   [facility]    [address] [place] [address] [phone]              Future tests that were ordered for you today     Open Future Orders        Priority Expected Expires Ordered    Fecal colorectal cancer screen (FIT) Routine 9/22/2017 11/24/2017 9/1/2017    MA Screening Digital Bilateral Routine  9/1/2018 9/1/2017            Who to contact     If you have questions or need follow up information about today's clinic visit or your schedule please contact [facility] directly at [phone].  Normal or non-critical lab and imaging results will be communicated to you by MyChart, letter or phone within 4 business days after the clinic has received the results. If you do not hear from us within 7 days, please contact the clinic through MyChart or phone. If you have a critical or abnormal lab result, we will notify you by phone as soon as possible.  Submit refill requests through  "MyChart or call your pharmacy and they will forward the refill request to us. Please allow 3 business days for your refill to be completed.          Additional Information About Your Visit        MyChart Information     [employer] gives you secure access to your electronic health record. If you see a primary care provider, you can also send messages to your care team and make appointments. If you have questions, please call your primary care clinic.  If you do not have a primary care provider, please call [phone] and they will assist you.        Care EveryWhere ID     This is your Care EveryWhere ID. This could be used by other organizations to access your [place] medical records  [id number]        Your Vitals Were     Pulse Temperature Height Pulse Oximetry BMI (Body Mass Index)       62 97.7  F (36.5  C) (Oral) 5' 5.25\" (1.657 m) 97% 18.02 kg/m2        Blood Pressure from Last 3 Encounters:   09/01/17 118/62   07/31/17 114/62   07/18/17 112/60    Weight from Last 3 Encounters:   09/01/17 109 lb 1.6 oz (49.5 kg)   07/31/17 109 lb (49.4 kg)   07/18/17 109 lb 12.8 oz (49.8 kg)              We Performed the Following     Comprehensive metabolic panel     Hemoglobin A1c     Lipid Profile (Chol, Trig, HDL, LDL calc)        Primary Care Provider Office Phone # Fax #    Prisca Lucero [phone] [address] [facility] [address]        Equal Access to Services     : Hadii aad ku hadasho Soomaali, waaxda luqadaha, qaybta kaalmada adeegyada, waxay jazmine culp . So [facility] [phone].    ATENCIÓN: Si habla español, tiene a muir disposición servicios gratuitos de asistencia lingüística. Llame al [phone].    We comply with applicable federal civil rights laws and [place] laws. We do not discriminate on the basis of race, color, national origin, age, disability sex, sexual orientation or gender identity.            Thank you!     Thank you for choosing " [facility]  for your care. Our goal is always to provide you with excellent care. Hearing back from our patients is one way we can continue to improve our services. Please take a few minutes to complete the written survey that you may receive in the mail after your visit with us. Thank you!             Your Updated Medication List - Protect others around you: Learn how to safely use, store and throw away your medicines at www.disposemymeds.org.          This list is accurate as of: 9/1/17 10:46 AM.  Always use your most recent med list.                   Brand Name Dispense Instructions for use Diagnosis    amoxicillin 500 MG capsule    AMOXIL    4 capsule    Take 4 capsules (2,000 mg) by mouth daily Take 4 cap--200mg-- 30-60 minutes before procedure    Need for SBE (subacute bacterial endocarditis) prophylaxis       aspirin 81 MG tablet      Take 1 tablet by mouth daily.    Routine general medical examination at a health care facility, CAD (coronary artery disease)       carvedilol 3.125 MG tablet    COREG    60 tablet    Take 1 tablet (3.125 mg) by mouth 2 times daily (with meals)        desonide 0.05 % cream    DESOWEN    15 g    Apply sparingly to affected area three times daily for 14 days.    Localized macular rash       famotidine 20 MG tablet    PEPCID    60 tablet    Take 1 tablet (20 mg) by mouth 2 times daily        ketoconazole 2 % cream    NIZORAL     Apply topically daily        LASIX 20 MG tablet   Generic drug:  furosemide     60 tablet    Take 20 mg by mouth Takes every other day    Heart failure (H), Hypertension goal BP (blood pressure) < 140/90       LIPITOR 20 MG tablet   Generic drug:  atorvastatin     90 tablet    Take 1 tablet (20 mg) by mouth daily        losartan 25 MG tablet    COZAAR     Take 25 mg by mouth daily        nitroGLYcerin 0.4 MG sublingual tablet    NITROSTAT    25 tablet    Place 1 tablet under the tongue every 5 minutes as needed for chest pain.    CAD  (coronary artery disease)       spironolactone 25 MG tablet    ALDACTONE    30 tablet    Take 0.5 tablets (12.5 mg) by mouth daily        * warfarin 1 MG tablet    COUMADIN    30 tablet    Half tablet on Fri        * warfarin 1 MG tablet    COUMADIN    30 tablet    Mon, Tues, Wed, Thurs, Sat, Sun    Heart failure, unspecified heart failure chronicity, unspecified heart failure type (H)       * Notice:  This list has 2 medication(s) that are the same as other medications prescribed for you. Read the directions carefully, and ask your doctor or other care provider to review them with you.

## 2017-09-01 NOTE — PATIENT INSTRUCTIONS

## 2017-09-02 LAB
ALBUMIN SERPL-MCNC: 4.1 G/DL (ref 3.4–5)
ALP SERPL-CCNC: 109 U/L (ref 40–150)
ALT SERPL W P-5'-P-CCNC: 34 U/L (ref 0–50)
ANION GAP SERPL CALCULATED.3IONS-SCNC: 6 MMOL/L (ref 3–14)
AST SERPL W P-5'-P-CCNC: 41 U/L (ref 0–45)
BILIRUB SERPL-MCNC: 0.7 MG/DL (ref 0.2–1.3)
BUN SERPL-MCNC: 22 MG/DL (ref 7–30)
CALCIUM SERPL-MCNC: 10.4 MG/DL (ref 8.5–10.1)
CHLORIDE SERPL-SCNC: 106 MMOL/L (ref 94–109)
CHOLEST SERPL-MCNC: 156 MG/DL
CO2 SERPL-SCNC: 27 MMOL/L (ref 20–32)
CREAT SERPL-MCNC: 0.79 MG/DL (ref 0.52–1.04)
GFR SERPL CREATININE-BSD FRML MDRD: 72 ML/MIN/1.7M2
GLUCOSE SERPL-MCNC: 99 MG/DL (ref 70–99)
HDLC SERPL-MCNC: 52 MG/DL
LDLC SERPL CALC-MCNC: 79 MG/DL
NONHDLC SERPL-MCNC: 104 MG/DL
POTASSIUM SERPL-SCNC: 4.2 MMOL/L (ref 3.4–5.3)
PROT SERPL-MCNC: 7.7 G/DL (ref 6.8–8.8)
SODIUM SERPL-SCNC: 139 MMOL/L (ref 133–144)
TRIGL SERPL-MCNC: 124 MG/DL

## 2017-09-08 PROCEDURE — 82274 ASSAY TEST FOR BLOOD FECAL: CPT | Performed by: FAMILY MEDICINE

## 2017-09-11 DIAGNOSIS — Z12.11 SPECIAL SCREENING FOR MALIGNANT NEOPLASMS, COLON: ICD-10-CM

## 2017-09-11 LAB — HEMOCCULT STL QL IA: NEGATIVE

## 2017-09-13 ENCOUNTER — TRANSFERRED RECORDS (OUTPATIENT)
Dept: HEALTH INFORMATION MANAGEMENT | Facility: CLINIC | Age: 72
End: 2017-09-13

## 2017-10-09 ENCOUNTER — RADIANT APPOINTMENT (OUTPATIENT)
Dept: BONE DENSITY | Facility: CLINIC | Age: 72
End: 2017-10-09
Attending: FAMILY MEDICINE
Payer: COMMERCIAL

## 2017-10-09 ENCOUNTER — RADIANT APPOINTMENT (OUTPATIENT)
Dept: MAMMOGRAPHY | Facility: CLINIC | Age: 72
End: 2017-10-09
Attending: FAMILY MEDICINE
Payer: COMMERCIAL

## 2017-10-09 DIAGNOSIS — Z78.0 MENOPAUSE: ICD-10-CM

## 2017-10-09 DIAGNOSIS — Z13.820 SCREENING FOR OSTEOPOROSIS: ICD-10-CM

## 2017-10-09 DIAGNOSIS — Z12.31 ENCOUNTER FOR SCREENING MAMMOGRAM FOR BREAST CANCER: ICD-10-CM

## 2017-10-09 PROCEDURE — G0202 SCR MAMMO BI INCL CAD: HCPCS | Mod: TC

## 2017-10-09 PROCEDURE — 77085 DXA BONE DENSITY AXL VRT FX: CPT | Performed by: FAMILY MEDICINE

## 2017-10-16 PROBLEM — M81.8 OTHER OSTEOPOROSIS WITHOUT CURRENT PATHOLOGICAL FRACTURE: Status: ACTIVE | Noted: 2017-10-16

## 2018-01-12 ENCOUNTER — TRANSFERRED RECORDS (OUTPATIENT)
Dept: HEALTH INFORMATION MANAGEMENT | Facility: CLINIC | Age: 73
End: 2018-01-12

## 2018-03-01 ENCOUNTER — TRANSFERRED RECORDS (OUTPATIENT)
Dept: HEALTH INFORMATION MANAGEMENT | Facility: CLINIC | Age: 73
End: 2018-03-01

## 2018-04-02 ENCOUNTER — TRANSFERRED RECORDS (OUTPATIENT)
Dept: HEALTH INFORMATION MANAGEMENT | Facility: CLINIC | Age: 73
End: 2018-04-02

## 2018-04-02 LAB
HBA1C MFR BLD: 6.3 % (ref 0–5.7)
INR PPP: 2.7
TSH SERPL-ACNC: 0.87 UIU/ML (ref 0.35–4.94)

## 2018-04-12 ENCOUNTER — TELEPHONE (OUTPATIENT)
Dept: FAMILY MEDICINE | Facility: CLINIC | Age: 73
End: 2018-04-12

## 2018-04-12 NOTE — TELEPHONE ENCOUNTER
Please call her and help her make an appointment. You can let her know I spoke with her Cardiologist.    Thanks!        ---------------------    For documentation:   Returned call to Dr. Justo Humphrey, Cardiology.  ([facility]; pager [phone])    He notes he has had a hard time sorting what all is going on.  She will say she feels well, but then admits to being short of breath.   She does have a high gradient across the aortic valve; so that is one thought; but her cardiomyopathy is improved.  Has done a stress test. She was no table to exercise much; notes profound deconditioning.  Also has had low hemoglobin. This has persisted; now microcytic.     Discussed possible evaluation of her anemia.  If includes bowel evaluation, would need bridging.

## 2018-04-24 ENCOUNTER — OFFICE VISIT (OUTPATIENT)
Dept: FAMILY MEDICINE | Facility: CLINIC | Age: 73
End: 2018-04-24
Payer: COMMERCIAL

## 2018-04-24 VITALS
TEMPERATURE: 97.6 F | SYSTOLIC BLOOD PRESSURE: 112 MMHG | OXYGEN SATURATION: 97 % | BODY MASS INDEX: 19.59 KG/M2 | RESPIRATION RATE: 16 BRPM | WEIGHT: 117.6 LBS | HEIGHT: 65 IN | HEART RATE: 75 BPM | DIASTOLIC BLOOD PRESSURE: 58 MMHG

## 2018-04-24 DIAGNOSIS — R53.83 FATIGUE, UNSPECIFIED TYPE: ICD-10-CM

## 2018-04-24 DIAGNOSIS — Z79.01 LONG TERM CURRENT USE OF ANTICOAGULANT: ICD-10-CM

## 2018-04-24 DIAGNOSIS — Z95.2 MITRAL VALVE REPLACED: ICD-10-CM

## 2018-04-24 DIAGNOSIS — R06.09 DOE (DYSPNEA ON EXERTION): ICD-10-CM

## 2018-04-24 DIAGNOSIS — D64.9 ANEMIA, UNSPECIFIED TYPE: Primary | ICD-10-CM

## 2018-04-24 DIAGNOSIS — I50.9 CHRONIC HEART FAILURE, UNSPECIFIED HEART FAILURE TYPE (H): ICD-10-CM

## 2018-04-24 DIAGNOSIS — Z95.2 AORTIC VALVE REPLACED: ICD-10-CM

## 2018-04-24 LAB
FOLATE SERPL-MCNC: 28.6 NG/ML
RETICS # AUTO: 56.2 10E9/L (ref 25–95)
RETICS/RBC NFR AUTO: 1.6 % (ref 0.5–2)
VIT B12 SERPL-MCNC: 270 PG/ML (ref 193–986)

## 2018-04-24 PROCEDURE — 85025 COMPLETE CBC W/AUTO DIFF WBC: CPT | Performed by: FAMILY MEDICINE

## 2018-04-24 PROCEDURE — 36415 COLL VENOUS BLD VENIPUNCTURE: CPT | Performed by: FAMILY MEDICINE

## 2018-04-24 PROCEDURE — 00000402 ZZHCL STATISTIC TOTAL PROTEIN: Performed by: FAMILY MEDICINE

## 2018-04-24 PROCEDURE — 85045 AUTOMATED RETICULOCYTE COUNT: CPT | Performed by: FAMILY MEDICINE

## 2018-04-24 PROCEDURE — 82607 VITAMIN B-12: CPT | Performed by: FAMILY MEDICINE

## 2018-04-24 PROCEDURE — 84165 PROTEIN E-PHORESIS SERUM: CPT | Performed by: FAMILY MEDICINE

## 2018-04-24 PROCEDURE — 40000847 ZZHCL STATISTIC MORPHOLOGY W/INTERP HISTOLOGY TC 85060: Performed by: FAMILY MEDICINE

## 2018-04-24 PROCEDURE — 82728 ASSAY OF FERRITIN: CPT | Performed by: FAMILY MEDICINE

## 2018-04-24 PROCEDURE — 99214 OFFICE O/P EST MOD 30 MIN: CPT | Performed by: FAMILY MEDICINE

## 2018-04-24 PROCEDURE — 85060 BLOOD SMEAR INTERPRETATION: CPT | Performed by: FAMILY MEDICINE

## 2018-04-24 PROCEDURE — 82746 ASSAY OF FOLIC ACID SERUM: CPT | Performed by: FAMILY MEDICINE

## 2018-04-24 PROCEDURE — 83550 IRON BINDING TEST: CPT | Performed by: FAMILY MEDICINE

## 2018-04-24 PROCEDURE — 83540 ASSAY OF IRON: CPT | Performed by: FAMILY MEDICINE

## 2018-04-24 RX ORDER — AMIODARONE HYDROCHLORIDE 200 MG/1
200 TABLET ORAL
COMMUNITY
Start: 2017-07-05 | End: 2018-07-19

## 2018-04-24 NOTE — MR AVS SNAPSHOT
"              After Visit Summary   4/24/2018    Devyn Link    MRN: [id number]           Patient Information     Date Of Birth          1945        Visit Information        Provider Department      4/24/2018 10:00 AM Prisca Lucero MD; [employer] [facility]        Today's Diagnoses     Chronic heart failure, unspecified heart failure type (H)    -  1    Screen for colon cancer        Anemia, unspecified type           Follow-ups after your visit        Follow-up notes from your care team     Return in about 3 weeks (around 5/15/2018), or if symptoms worsen or fail to improve; pending labs.      Who to contact     If you have questions or need follow up information about today's clinic visit or your schedule please contact [facility] directly at [phone].  Normal or non-critical lab and imaging results will be communicated to you by MyChart, letter or phone within 4 business days after the clinic has received the results. If you do not hear from us within 7 days, please contact the clinic through MyChart or phone. If you have a critical or abnormal lab result, we will notify you by phone as soon as possible.  Submit refill requests through [employer] or call your pharmacy and they will forward the refill request to us. Please allow 3 business days for your refill to be completed.          Additional Information About Your Visit        MyChart Information     [employer] lets you send messages to your doctor, view your test results, renew your prescriptions, schedule appointments and more. To sign up, go to [email or web address] . Click on \"Log in\" on the left side of the screen, which will take you to the Welcome page. Then click on \"Sign up Now\" on the right side of the page.     You will be asked to enter the access code listed below, as well as some personal information. Please follow the directions to create your username and password.     Your access code is: " [email or web address]  Expires: 2018 10:54 AM     Your access code will  in 90 days. If you need help or a new code, please call your [place] clinic or [phone].        Care EveryWhere ID     This is your Care EveryWhere ID. This could be used by other organizations to access your [place] medical records  [id number]        Your Vitals Were     Pulse Temperature Respirations Height Last Period Pulse Oximetry    75 97.6  F (36.4  C) (Oral) 16 5' 5.25\" (1.657 m) (LMP Unknown) 97%    Breastfeeding? BMI (Body Mass Index)                No 19.42 kg/m2           Blood Pressure from Last 3 Encounters:   18 112/58   17 118/62   17 114/62    Weight from Last 3 Encounters:   18 117 lb 9.6 oz (53.3 kg)   17 109 lb 1.6 oz (49.5 kg)   17 109 lb (49.4 kg)              We Performed the Following     Blood Morphology Pathologist Review     CBC with platelets differential     Ferritin     Folate     Iron and iron binding capacity     Protein electrophoresis     Reticulocyte Count     Vitamin B12        Primary Care Provider Office Phone # Fax #    Prisca Lucero [phone] [address] [facility] [address]        Equal Access to Services     [facility]: Hadii aad ku hadasho Soomaali, waaxda luqadaha, qaybta kaalmada adeegyada, waxay idiin hayaan maile kharagodfrey culp . So [facility] [phone].    ATENCIÓN: Si habla español, tiene a muir disposición servicios gratuitos de asistencia lingüística. Llame al [phone].    We comply with applicable federal civil rights laws and [place] laws. We do not discriminate on the basis of race, color, national origin, age, disability, sex, sexual orientation, or gender identity.            Thank you!     Thank you for choosing [facility]  for your care. Our goal is always to provide you with excellent care. Hearing back from our patients is one way we can continue to improve our services. Please take a few " minutes to complete the written survey that you may receive in the mail after your visit with us. Thank you!             Your Updated Medication List - Protect others around you: Learn how to safely use, store and throw away your medicines at www.disposemymeds.org.          This list is accurate as of 4/24/18 10:54 AM.  Always use your most recent med list.                   Brand Name Dispense Instructions for use Diagnosis    amiodarone 200 MG tablet    PACERONE/CODARONE     200 mg        aspirin 81 MG tablet      Take 1 tablet by mouth daily.    Routine general medical examination at a health care facility, CAD (coronary artery disease)       calcium-vitamin D 500-125 MG-UNIT Tabs           carvedilol 3.125 MG tablet    COREG    60 tablet    Take 1 tablet (3.125 mg) by mouth 2 times daily (with meals)        desonide 0.05 % cream    DESOWEN    15 g    Apply sparingly to affected area three times daily for 14 days.    Localized macular rash       enoxaparin 40 MG/0.4ML injection    LOVENOX          famotidine 20 MG tablet    PEPCID    60 tablet    Take 1 tablet (20 mg) by mouth 2 times daily        ketoconazole 2 % cream    NIZORAL     Apply topically daily        LASIX 20 MG tablet   Generic drug:  furosemide     60 tablet    Take 20 mg by mouth Takes every other day    Heart failure (H), Hypertension goal BP (blood pressure) < 140/90       LIPITOR 20 MG tablet   Generic drug:  atorvastatin     90 tablet    Take 1 tablet (20 mg) by mouth daily        losartan 25 MG tablet    COZAAR     Take 25 mg by mouth daily        nitroGLYcerin 0.4 MG sublingual tablet    NITROSTAT    25 tablet    Place 1 tablet under the tongue every 5 minutes as needed for chest pain.    CAD (coronary artery disease)       spironolactone 25 MG tablet    ALDACTONE    30 tablet    Take 0.5 tablets (12.5 mg) by mouth daily        * warfarin 1 MG tablet    COUMADIN    30 tablet    Half tablet on Fri        * warfarin 1 MG tablet    COUMADIN     30 tablet    Mon, Tues, Wed, Thurs, Sat, Sun    Heart failure, unspecified heart failure chronicity, unspecified heart failure type (H)       * Notice:  This list has 2 medication(s) that are the same as other medications prescribed for you. Read the directions carefully, and ask your doctor or other care provider to review them with you.

## 2018-04-24 NOTE — PROGRESS NOTES
SUBJECTIVE:   Devyn Link is a 72 year old female who presents to clinic today for the following health issues:      Follow-up from Cardio      Duration: off and on several years    Description (location/character/radiation): fatigue and SOB off and on for several years    Intensity:  moderate    Accompanying signs and symptoms:     History (similar episodes/previous evaluation): has hx of PVC's in 2013, CAD, hypertension, Atrial fibrillation, Aortic valve replacement in 1995, mitral valve replacement in 1995, AVB, biventricular pacemaker placed in 2014; currently being seen at [facility] for cardiology    Precipitating or alleviating factors: None    Therapies tried and outcome: None         Problem list and histories reviewed & adjusted, as indicated.  Additional history: as documented    See under ROS     Patient Active Problem List   Diagnosis     Hyperlipidemia LDL goal <70     CAD (coronary artery disease)     Long term current use of anticoagulant     Hypertension goal BP (blood pressure) < 140/90     Advanced directives, counseling/discussion     Bradycardia     Health Care Home     Chronic atrial fibrillation (H)     Heart failure (H)     Anemia     Pacemaker     Aortic valve replaced     Mitral valve replaced     Abnormal glucose     Prediabetes     Other osteoporosis without current pathological fracture       Current Outpatient Prescriptions   Medication Sig Dispense Refill     amiodarone (PACERONE/CODARONE) 200 MG tablet 200 mg       aspirin 81 MG tablet Take 1 tablet by mouth daily.  3     atorvastatin (LIPITOR) 20 MG tablet Take 1 tablet (20 mg) by mouth daily 90 tablet 1     calcium-vitamin D 500-125 MG-UNIT TABS        carvedilol (COREG) 3.125 MG tablet Take 1 tablet (3.125 mg) by mouth 2 times daily (with meals) 60 tablet      desonide (DESOWEN) 0.05 % cream Apply sparingly to affected area three times daily for 14 days. 15 g 0     enoxaparin (LOVENOX) 40 MG/0.4ML injection        famotidine (PEPCID) 20  "MG tablet Take 1 tablet (20 mg) by mouth 2 times daily 60 tablet 1     furosemide (LASIX) 20 MG tablet Take 20 mg by mouth Takes every other day 60 tablet 1     ketoconazole (NIZORAL) 2 % cream Apply topically daily       losartan (COZAAR) 25 MG tablet Take 25 mg by mouth daily       nitroglycerin (NITROSTAT) 0.4 MG SL tablet Place 1 tablet under the tongue every 5 minutes as needed for chest pain. 25 tablet 0     spironolactone (ALDACTONE) 25 MG tablet Take 0.5 tablets (12.5 mg) by mouth daily 30 tablet 1     warfarin (COUMADIN) 1 MG tablet Half tablet on Fri 30 tablet      warfarin (COUMADIN) 1 MG tablet Mon, Tues, Wed, Thurs, Sat, Sun 30 tablet        Reviewed and updated as needed this visit by clinical staff  Tobacco  Allergies  Meds  Med Hx  Surg Hx  Fam Hx  Soc Hx      Reviewed and updated as needed this visit by Provider         ROS:  Here with  and .  CONSTITUTIONAL:NEGATIVE for fever, chills, change in weight  RESP:NEGATIVE for significant cough or short of breath x ORNELAS  CV: NEGATIVE for chest pain, palpitations or peripheral edema  GI: Occasional nausea. No vomiting.   Bm's OK. Not daily. No blood.   : denies vaginal or urinary bleeding.   PSYCHIATRIC: NEGATIVE for changes in mood or affect      She is here to further evaluate regarding her fatigue and dyspnea on exertion.  See phone call with her Cardiologist.   She has been anemic; now with low MCV.    Regarding bleeding, she reports only occasional minor bleeding.  Nose.   Skin.    She has taken iron in the past.  Did bother her stomach.    Sometimes she will get a headache.  Sometimes upper back pain on right side.  Will get dizzy if tired.       OBJECTIVE:     /58 (BP Location: Right arm, Patient Position: Chair, Cuff Size: Adult Regular)  Pulse 75  Temp 97.6  F (36.4  C) (Oral)  Resp 16  Ht 5' 5.25\" (1.657 m)  Wt 117 lb 9.6 oz (53.3 kg)  LMP  (LMP Unknown)  SpO2 97%  Breastfeeding? No  BMI 19.42 kg/m2  Body " mass index is 19.42 kg/(m^2).  GENERAL APPEARANCE: alert and no distress  RESP: lungs clear to auscultation - no rales, rhonchi or wheezes  CV: regular rates and rhythm  ABDOMEN: soft, nontender, without hepatosplenomegaly or masses  MS: no edema.   PSYCH: mentation appears normal and affect normal/bright    From Care Everywhere:                ASSESSMENT/PLAN:       Anemia, unspecified type  Suspect iron deficiency based on indices. Will do more thorough evaluation regarding underlying etiology.  Discussed if iron, may wish to attempt oral iron again. Discussed and given information regarding iron food sources; they do note some of these may be difficult as it would affect her INR.  Discussed Hematology referral. They can assist in evaluation. May recommend iron infusion if unable to tolerate iron otherwise.  Additionally discussed we may wish to evaluate for underlying reason of iron deficiency if confirmed. Frequently will evaluate the GI tract.   However, as noted with conversation with Cardiology, would want to bridge if coming off coumadin (has had a couple valve replacements)  - Blood Morphology Pathologist Review  - CBC with platelets differential  - Reticulocyte Count  - Iron and iron binding capacity  - Ferritin  - Vitamin B12  - Folate  - Protein electrophoresis    Chronic heart failure, unspecified heart failure type (H)  As above. Has been seeing Cardiology. Has had valve replacements.     Fatigue, unspecified type  Suspect multifactorial. Currently evaluating the anemia.     ORNELAS (dyspnea on exertion)  As above.    Long term current use of anticoagulant  As above.    Aortic valve replaced  As above.     Mitral valve replaced  As above.        Prisca Lucero MD, MD  [facility]

## 2018-04-24 NOTE — LETTER
[facility]  [address] [facility] [address] [phone]          April 26, 2018    Devyn Link                                                                                                                     [address]  [facility] [place] [address]            Dear Hussein Shepard is a copy of your recent results.    The labs do confirm that you are iron deficient.   At this time, I do think the treatment is iron.   You can try an over the counter iron supplement, such as ferrous sulfate, one daily and follow up in the clinic in 2 weeks.    At this time, I don't know why you are iron deficient; we may wish to evaluate for that as well.    I do suspect we will be asking a Hematologist or blood specialist to assist especially as you have not tolerated oral iron in the past. If you would like, we could refer you directly there now. We do have them in [place].      Sincerely,         Prisca Lucero MD

## 2018-04-25 LAB
ALBUMIN SERPL ELPH-MCNC: 4.1 G/DL (ref 3.7–5.1)
ALPHA1 GLOB SERPL ELPH-MCNC: 0.3 G/DL (ref 0.2–0.4)
ALPHA2 GLOB SERPL ELPH-MCNC: 0.5 G/DL (ref 0.5–0.9)
ANISOCYTOSIS BLD QL SMEAR: SLIGHT
B-GLOBULIN SERPL ELPH-MCNC: 0.8 G/DL (ref 0.6–1)
BASOPHILS # BLD AUTO: 0.1 10E9/L (ref 0–0.2)
BASOPHILS NFR BLD AUTO: 1 %
COPATH REPORT: NORMAL
DIFFERENTIAL METHOD BLD: ABNORMAL
EOSINOPHIL # BLD AUTO: 0.1 10E9/L (ref 0–0.7)
EOSINOPHIL NFR BLD AUTO: 2 %
ERYTHROCYTE [DISTWIDTH] IN BLOOD BY AUTOMATED COUNT: 18 % (ref 10–15)
FERRITIN SERPL-MCNC: 6 NG/ML (ref 8–252)
GAMMA GLOB SERPL ELPH-MCNC: 1.2 G/DL (ref 0.7–1.6)
HCT VFR BLD AUTO: 29 % (ref 35–47)
HGB BLD-MCNC: 8.5 G/DL (ref 11.7–15.7)
HYPOCHROMIA BLD QL: PRESENT
IRON SATN MFR SERPL: 10 % (ref 15–46)
IRON SERPL-MCNC: 36 UG/DL (ref 35–180)
LYMPHOCYTES # BLD AUTO: 0.7 10E9/L (ref 0.8–5.3)
LYMPHOCYTES NFR BLD AUTO: 11 %
M PROTEIN SERPL ELPH-MCNC: 0 G/DL
MACROCYTES BLD QL SMEAR: PRESENT
MCH RBC QN AUTO: 24.1 PG (ref 26.5–33)
MCHC RBC AUTO-ENTMCNC: 29.3 G/DL (ref 31.5–36.5)
MCV RBC AUTO: 82 FL (ref 78–100)
MICROCYTES BLD QL SMEAR: PRESENT
MONOCYTES # BLD AUTO: 0.5 10E9/L (ref 0–1.3)
MONOCYTES NFR BLD AUTO: 8 %
NEUTROPHILS # BLD AUTO: 4.6 10E9/L (ref 1.6–8.3)
NEUTROPHILS NFR BLD AUTO: 78 %
NEUTS HYPERSEG BLD QL SMEAR: ABNORMAL
OVALOCYTES BLD QL SMEAR: SLIGHT
PLATELET # BLD AUTO: 278 10E9/L (ref 150–450)
PLATELET # BLD EST: ABNORMAL 10*3/UL
PROT PATTERN SERPL ELPH-IMP: NORMAL
RBC # BLD AUTO: 3.52 10E12/L (ref 3.8–5.2)
TIBC SERPL-MCNC: 378 UG/DL (ref 240–430)
WBC # BLD AUTO: 6 10E9/L (ref 4–11)

## 2018-05-09 PROBLEM — D50.9 IRON DEFICIENCY ANEMIA, UNSPECIFIED IRON DEFICIENCY ANEMIA TYPE: Status: ACTIVE | Noted: 2018-05-09

## 2018-05-10 ENCOUNTER — OFFICE VISIT (OUTPATIENT)
Dept: FAMILY MEDICINE | Facility: CLINIC | Age: 73
End: 2018-05-10
Payer: COMMERCIAL

## 2018-05-10 VITALS
BODY MASS INDEX: 19.18 KG/M2 | SYSTOLIC BLOOD PRESSURE: 148 MMHG | DIASTOLIC BLOOD PRESSURE: 68 MMHG | HEART RATE: 77 BPM | OXYGEN SATURATION: 97 % | TEMPERATURE: 98.2 F | WEIGHT: 115.1 LBS | RESPIRATION RATE: 16 BRPM | HEIGHT: 65 IN

## 2018-05-10 DIAGNOSIS — I48.20 CHRONIC ATRIAL FIBRILLATION (H): ICD-10-CM

## 2018-05-10 DIAGNOSIS — Z79.01 LONG TERM CURRENT USE OF ANTICOAGULANT: ICD-10-CM

## 2018-05-10 DIAGNOSIS — Z95.2 MITRAL VALVE REPLACED: ICD-10-CM

## 2018-05-10 DIAGNOSIS — D50.9 IRON DEFICIENCY ANEMIA, UNSPECIFIED IRON DEFICIENCY ANEMIA TYPE: Primary | ICD-10-CM

## 2018-05-10 DIAGNOSIS — I50.9 CHRONIC HEART FAILURE, UNSPECIFIED HEART FAILURE TYPE (H): ICD-10-CM

## 2018-05-10 DIAGNOSIS — Z95.2 AORTIC VALVE REPLACED: ICD-10-CM

## 2018-05-10 DIAGNOSIS — E83.52 HYPERCALCEMIA: ICD-10-CM

## 2018-05-10 DIAGNOSIS — Z12.11 SCREEN FOR COLON CANCER: ICD-10-CM

## 2018-05-10 LAB
ERYTHROCYTE [DISTWIDTH] IN BLOOD BY AUTOMATED COUNT: 23.7 % (ref 10–15)
HCT VFR BLD AUTO: 34.4 % (ref 35–47)
HGB BLD-MCNC: 10.2 G/DL (ref 11.7–15.7)
MCH RBC QN AUTO: 25.8 PG (ref 26.5–33)
MCHC RBC AUTO-ENTMCNC: 29.7 G/DL (ref 31.5–36.5)
MCV RBC AUTO: 87 FL (ref 78–100)
PLATELET # BLD AUTO: 246 10E9/L (ref 150–450)
RBC # BLD AUTO: 3.95 10E12/L (ref 3.8–5.2)
WBC # BLD AUTO: 7.1 10E9/L (ref 4–11)

## 2018-05-10 PROCEDURE — 80048 BASIC METABOLIC PNL TOTAL CA: CPT | Performed by: FAMILY MEDICINE

## 2018-05-10 PROCEDURE — 36415 COLL VENOUS BLD VENIPUNCTURE: CPT | Performed by: FAMILY MEDICINE

## 2018-05-10 PROCEDURE — 85027 COMPLETE CBC AUTOMATED: CPT | Performed by: FAMILY MEDICINE

## 2018-05-10 PROCEDURE — 99214 OFFICE O/P EST MOD 30 MIN: CPT | Performed by: FAMILY MEDICINE

## 2018-05-10 NOTE — PROGRESS NOTES
SUBJECTIVE:   Devyn Link is a 72 year old female who presents to clinic today for the following health issues:      Iron deficiency Anemia  See lab results        Problem list and histories reviewed & adjusted, as indicated.  Additional history: as documented    See under ROS     Patient Active Problem List   Diagnosis     Hyperlipidemia LDL goal <70     CAD (coronary artery disease)     Long term current use of anticoagulant     Hypertension goal BP (blood pressure) < 140/90     Advanced directives, counseling/discussion     Bradycardia     Health Care Home     Chronic atrial fibrillation (H)     Heart failure (H)     Anemia     Pacemaker     Aortic valve replaced     Mitral valve replaced     Abnormal glucose     Prediabetes     Other osteoporosis without current pathological fracture     Iron deficiency anemia, unspecified iron deficiency anemia type       Current Outpatient Prescriptions   Medication Sig Dispense Refill     amiodarone (PACERONE/CODARONE) 200 MG tablet 200 mg       aspirin 81 MG tablet Take 1 tablet by mouth daily.  3     atorvastatin (LIPITOR) 20 MG tablet Take 1 tablet (20 mg) by mouth daily 90 tablet 1     calcium-vitamin D 500-125 MG-UNIT TABS        carvedilol (COREG) 3.125 MG tablet Take 1 tablet (3.125 mg) by mouth 2 times daily (with meals) 60 tablet      desonide (DESOWEN) 0.05 % cream Apply sparingly to affected area three times daily for 14 days. 15 g 0     enoxaparin (LOVENOX) 40 MG/0.4ML injection        famotidine (PEPCID) 20 MG tablet Take 1 tablet (20 mg) by mouth 2 times daily 60 tablet 1     Ferrous Sulfate (IRON SUPPLEMENT PO) Take 325 mg by mouth       furosemide (LASIX) 20 MG tablet Take 20 mg by mouth Takes every other day 60 tablet 1     ketoconazole (NIZORAL) 2 % cream Apply topically daily       losartan (COZAAR) 25 MG tablet Take 25 mg by mouth daily       nitroglycerin (NITROSTAT) 0.4 MG SL tablet Place 1 tablet under the tongue every 5 minutes as needed for chest pain.  "25 tablet 0     spironolactone (ALDACTONE) 25 MG tablet Take 0.5 tablets (12.5 mg) by mouth daily 30 tablet 1     warfarin (COUMADIN) 1 MG tablet Half tablet on Fri 30 tablet      warfarin (COUMADIN) 1 MG tablet Mon, Tues, Wed, Thurs, Sat, Sun 30 tablet          Reviewed and updated as needed this visit by clinical staff  Tobacco  Allergies  Meds  Med Hx  Surg Hx  Fam Hx  Soc Hx      Reviewed and updated as needed this visit by Provider         ROS:  Here with her  and an .    CONSTITUTIONAL:NEGATIVE for fever, chills, change in weight  CV: NEGATIVE for chest pain, palpitations or peripheral edema  GI: NEGATIVE for nausea, abdominal pain, heartburn, or change in bowel habits  PSYCHIATRIC: NEGATIVE for changes in mood or affect    Follow up of anemia and dyspnea on exertion.   Feeling better.  Tolerating iron; taking with rice.        OBJECTIVE:     /68 (BP Location: Right arm, Patient Position: Chair, Cuff Size: Adult Regular)  Pulse 77  Temp 98.2  F (36.8  C) (Oral)  Resp 16  Ht 5' 5.25\" (1.657 m)  Wt 115 lb 1.6 oz (52.2 kg)  LMP  (LMP Unknown)  SpO2 97%  Breastfeeding? No  BMI 19.01 kg/m2  Body mass index is 19.01 kg/(m^2).       GENERAL APPEARANCE: alert and no distress  RESP: lungs clear to auscultation - no rales, rhonchi or wheezes  CV: regular rates and rhythm  MS: no edema.   PSYCH: mentation appears normal and affect normal/bright    Hemoglobin   Date Value Ref Range Status   05/10/2018 10.2 (L) 11.7 - 15.7 g/dL Final     Comment:     Results confirmed by repeat test   04/24/2018 8.5 (L) 11.7 - 15.7 g/dL Final     Comment:     Results confirmed by repeat test   ]          ASSESSMENT/PLAN:     Iron deficiency anemia, unspecified iron deficiency anemia type  Improving on oral iron; will continue.   Discussed she may be able to go to [place]; at this time she is planning to continue as she is. She is doing well.  Discussed at this time, we do not know why she is low. " Discussed options for evaluating and some of the possibilities for pursuing.   She and her  would like to evaluate. Discussed looking at GI tract; EGD and colonoscopy. Discussed would need to bridge regarding blood clot.  - **CBC with platelets FUTURE anytime; Future  - GASTROENTEROLOGY ADULT REF PROCEDURE ONLY Carole Farleyge [phone]; [facility]  - **CBC with platelets FUTURE anytime    Chronic heart failure, unspecified heart failure type (H)    - BASIC METABOLIC PANEL; Future  - BASIC METABOLIC PANEL    Long term current use of anticoagulant  Had discussed with Cardiology. He does recommend bridging if her GI tract is evaluated.   She is followed at Cardiology clinic at [facility] for anticoagulation.   Will need to coordinate with them around this.     Chronic atrial fibrillation (H)  As above.     Aortic valve replaced  Higher risk and reason for bridging.     Mitral valve replaced  Higher risk and reason for bridging.     Screen for colon cancer    - GASTROENTEROLOGY ADULT REF PROCEDURE ONLY Carole Cervantesierge [phone]; [facility]      At this time, will put in a Care Coordination referral to assist with coordinating above.         Patient Instructions   Keep up the good work.    Let me know when this is scheduled so I can coordinate with your Cardiologist around the blood thinner.    I am thinking it would be good to see you again in a month to recheck your hemoglobin.      Prisca Lucero MD, MD  [facility]

## 2018-05-10 NOTE — PATIENT INSTRUCTIONS
Keep up the good work.    Let me know when this is scheduled so I can coordinate with your Cardiologist around the blood thinner.    I am thinking it would be good to see you again in a month to recheck your hemoglobin.

## 2018-05-10 NOTE — LETTER
[facility]  [address] [address] [phone]          May 16, 2018    Devyn Link                                                                                                                     [id number]  [facility] [facility] [address]            Dear Hussein Shepard is a copy of your recent results.    We did talk about your hemoglobin when you were in the clinic; it had improved.    Your glucose was elevated. I don't believe you were fasting, but it is still on the high end.  We have checked your HgbA1C recently. I do think we should continue to follow this as well.            The calcium is a little high. It may or may not be significant. I would like to recheck this in the future and also include an albumin. The calcium is highly protein bound and there is a correction for this that uses the protein, albumin, in it.   We will recheck this next time you get your blood drawn.    Have a great rest of Spring!    Sincerely,         Prisca Lucero MD

## 2018-05-10 NOTE — MR AVS SNAPSHOT
After Visit Summary   5/10/2018    Devyn Link    MRN: [id number]           Patient Information     Date Of Birth          1945        Visit Information        Provider Department      5/10/2018 3:45 PM Prisca Lucero MD; [employer] [facility] [place]        Today's Diagnoses     Iron deficiency anemia, unspecified iron deficiency anemia type    -  1    Screen for colon cancer        Chronic heart failure, unspecified heart failure type (H)          Care Instructions    Keep up the good work.    Let me know when this is scheduled so I can coordinate with your Cardiologist around the blood thinner.    I am thinking it would be good to see you again in a month to recheck your hemoglobin.          Follow-ups after your visit        Additional Services     GASTROENTEROLOGY ADULT REF PROCEDURE ONLY Sohams Emiliana [phone]; [facility]       Last Lab Result: Creatinine (mg/dL)       Date                     Value                 09/01/2017               0.79             ----------  There is no height or weight on file to calculate BMI.     Needed:  Yes  Language:  Mandarin    Patient will be contacted to schedule procedure.     Please be aware that coverage of these services is subject to the terms and limitations of your health insurance plan.  Call member services at your health plan with any benefit or coverage questions.  Any procedures must be performed at a [facility] facility OR coordinated by your clinic's referral office.    Please bring the following with you to your appointment:    (1) Any X-Rays, CTs or MRIs which have been performed.  Contact the facility where they were done to arrange for  prior to your scheduled appointment.    (2) List of current medications   (3) This referral request   (4) Any documents/labs given to you for this referral                  Follow-up notes from your care team     Return in about 4 weeks (around 6/7/2018)  "for Medication recheck; anemia.      Your next 10 appointments already scheduled     2018 10:10 AM CDT   SHORT with Prisca Lucero MD   [facility]    [address] [address] [phone]              Who to contact     If you have questions or need follow up information about today's clinic visit or your schedule please contact [facility] directly at [phone].  Normal or non-critical lab and imaging results will be communicated to you by MyChart, letter or phone within 4 business days after the clinic has received the results. If you do not hear from us within 7 days, please contact the clinic through [employer] or phone. If you have a critical or abnormal lab result, we will notify you by phone as soon as possible.  Submit refill requests through [employer] or call your pharmacy and they will forward the refill request to us. Please allow 3 business days for your refill to be completed.          Additional Information About Your Visit        [employer] Information     [employer] lets you send messages to your doctor, view your test results, renew your prescriptions, schedule appointments and more. To sign up, go to [email or web address] . Click on \"Log in\" on the left side of the screen, which will take you to the Welcome page. Then click on \"Sign up Now\" on the right side of the page.     You will be asked to enter the access code listed below, as well as some personal information. Please follow the directions to create your username and password.     Your access code is: [email or web address]  Expires: 2018 10:54 AM     Your access code will  in 90 days. If you need help or a new code, please call your [facility] clinic or [phone].        Care EveryWhere ID     This is your Care EveryWhere ID. This could be used by other organizations to access your [facility] medical records  [id number]        Your Vitals Were     Pulse " "Temperature Respirations Height Last Period Pulse Oximetry    77 98.2  F (36.8  C) (Oral) 16 5' 5.25\" (1.657 m) (LMP Unknown) 97%    Breastfeeding? BMI (Body Mass Index)                No 19.01 kg/m2           Blood Pressure from Last 3 Encounters:   05/10/18 148/68   04/24/18 112/58   09/01/17 118/62    Weight from Last 3 Encounters:   05/10/18 115 lb 1.6 oz (52.2 kg)   04/24/18 117 lb 9.6 oz (53.3 kg)   09/01/17 109 lb 1.6 oz (49.5 kg)              We Performed the Following     **CBC with platelets FUTURE anytime     BASIC METABOLIC PANEL     GASTROENTEROLOGY ADULT REF PROCEDURE ONLY Sohams Emiliana [phone]; [facility]        Primary Care Provider Office Phone # Fax #    Prisca Lucero [phone] [address] [facility] [address]        Equal Access to Services     [facility]: Hadii aad ku hadasho Soomaali, waaxda luqadaha, qaybta kaalmada adeegyada, waxay idiin hayaan maile culp . So [facility] [phone].    ATENCIÓN: Si habla español, tiene a muir disposición servicios gratuitos de asistencia lingüística. Llame al [phone].    We comply with applicable federal civil rights laws and [place] laws. We do not discriminate on the basis of race, color, national origin, age, disability, sex, sexual orientation, or gender identity.            Thank you!     Thank you for choosing [facility]  for your care. Our goal is always to provide you with excellent care. Hearing back from our patients is one way we can continue to improve our services. Please take a few minutes to complete the written survey that you may receive in the mail after your visit with us. Thank you!             Your Updated Medication List - Protect others around you: Learn how to safely use, store and throw away your medicines at www.disposemymeds.org.          This list is accurate as of 5/10/18  4:35 PM.  Always use your most recent med list.                   Brand Name Dispense " Instructions for use Diagnosis    amiodarone 200 MG tablet    PACERONE/CODARONE     200 mg        aspirin 81 MG tablet      Take 1 tablet by mouth daily.    Routine general medical examination at a health care facility, CAD (coronary artery disease)       calcium-vitamin D 500-125 MG-UNIT Tabs           carvedilol 3.125 MG tablet    COREG    60 tablet    Take 1 tablet (3.125 mg) by mouth 2 times daily (with meals)        desonide 0.05 % cream    DESOWEN    15 g    Apply sparingly to affected area three times daily for 14 days.    Localized macular rash       enoxaparin 40 MG/0.4ML injection    LOVENOX          famotidine 20 MG tablet    PEPCID    60 tablet    Take 1 tablet (20 mg) by mouth 2 times daily        IRON SUPPLEMENT PO      Take 325 mg by mouth        ketoconazole 2 % cream    NIZORAL     Apply topically daily        LASIX 20 MG tablet   Generic drug:  furosemide     60 tablet    Take 20 mg by mouth Takes every other day    Heart failure (H), Hypertension goal BP (blood pressure) < 140/90       LIPITOR 20 MG tablet   Generic drug:  atorvastatin     90 tablet    Take 1 tablet (20 mg) by mouth daily        losartan 25 MG tablet    COZAAR     Take 25 mg by mouth daily        nitroGLYcerin 0.4 MG sublingual tablet    NITROSTAT    25 tablet    Place 1 tablet under the tongue every 5 minutes as needed for chest pain.    CAD (coronary artery disease)       spironolactone 25 MG tablet    ALDACTONE    30 tablet    Take 0.5 tablets (12.5 mg) by mouth daily        * warfarin 1 MG tablet    COUMADIN    30 tablet    Half tablet on Fri        * warfarin 1 MG tablet    COUMADIN    30 tablet    Mon, Tues, Wed, Thurs, Sat, Sun    Heart failure, unspecified heart failure chronicity, unspecified heart failure type (H)       * Notice:  This list has 2 medication(s) that are the same as other medications prescribed for you. Read the directions carefully, and ask your doctor or other care provider to review them with you.

## 2018-05-11 LAB
ANION GAP SERPL CALCULATED.3IONS-SCNC: 9 MMOL/L (ref 3–14)
BUN SERPL-MCNC: 26 MG/DL (ref 7–30)
CALCIUM SERPL-MCNC: 10.7 MG/DL (ref 8.5–10.1)
CHLORIDE SERPL-SCNC: 110 MMOL/L (ref 94–109)
CO2 SERPL-SCNC: 24 MMOL/L (ref 20–32)
CREAT SERPL-MCNC: 0.82 MG/DL (ref 0.52–1.04)
GFR SERPL CREATININE-BSD FRML MDRD: 68 ML/MIN/1.7M2
GLUCOSE SERPL-MCNC: 171 MG/DL (ref 70–99)
POTASSIUM SERPL-SCNC: 4.2 MMOL/L (ref 3.4–5.3)
SODIUM SERPL-SCNC: 143 MMOL/L (ref 133–144)

## 2018-05-16 ENCOUNTER — TELEPHONE (OUTPATIENT)
Dept: FAMILY MEDICINE | Facility: CLINIC | Age: 73
End: 2018-05-16

## 2018-05-16 ENCOUNTER — PATIENT OUTREACH (OUTPATIENT)
Dept: CARE COORDINATION | Facility: CLINIC | Age: 73
End: 2018-05-16

## 2018-05-16 NOTE — TELEPHONE ENCOUNTER
Spoke to patient's  and he states Devyn will be having her G.I. Tract and colonoscopy looked at 6/13/18 at 10:15 am. He mentioned that you needed to speak with her cardiologist prior to coordinate things. Also, wants to know about the medication the patient needs before and after the procedure. He states those scripts can be sent to [facility] which is a mail order pharmacy. They can be reached at [phone] to discuss next steps. I contacted a mandarin  to help translate this call.

## 2018-05-16 NOTE — PROGRESS NOTES
Clinic Care Coordination Contact  Care Team Conversations      LOV 05/10/2018 with Dr. Lucero - See OV notes for details.     Patient Active Problem List   Diagnosis     Hyperlipidemia LDL goal <70     CAD (coronary artery disease)     Long term current use of anticoagulant     Hypertension goal BP (blood pressure) < 140/90     Advanced directives, counseling/discussion     Bradycardia     Health Care Home     Chronic atrial fibrillation (H)     Heart failure (H)     Anemia     Pacemaker     Aortic valve replaced     Mitral valve replaced     Abnormal glucose     Prediabetes     Other osteoporosis without current pathological fracture     Iron deficiency anemia, unspecified iron deficiency anemia type       Source of Referral:  PCP- see below.     CARE COORDINATION REFERRAL [[id number]]   Electronically signed by: Prisca Lucero MD on 05/12/18 1924 Status: Completed   Ordering user: Prisca Lucero MD 05/12/18 1924           Order History   Outpatient   Date/Time Action Taken User Additional Information   05/12/18 1924 Sign Prisca Lucero MD    05/12/18 1924 Complete Prisca Lucero MD    Comments   Services are provided by a Care Coordinator for people with complex needs such as: medical, social, or financial troubles.  The Care Coordinator works with the patient and their Primary Care Provider to determine health goals, obtain resources, achieve outcomes, and develop care plans that help coordinate the patient's care.     Reason for Referral: Complex Medical Concerns/Education: Iron Deficiency anemia and anticoagulation.    Additional pertinent details:  She will be scheduling EGD and colonoscopy. Will need bridging of her coumadin with Lovenox.   This is managed by her Cardiologist at [facility].  This will need to be coordinated with them at this time, do not know the date for procedure. I will be gone part of next week. I did let them know this and I did request them to call once scheduled. Also with limited  "English.    Clinical Staff have discussed the Care Coordination Referral with the patient and/or caregiver: no; but did discuss to call us so we could help coordinate....   Associated Diagnoses   Iron deficiency anemia, unspecified iron deficiency anemia type [D50.9]  - Primary       Long term current use of anticoagulant [Z79.01]       Aortic valve replaced [Z95.2]       Mitral valve replaced [Z95.2]           *Please refer to TODAY (05/16/2018) telephone encounter - \"Call Back (G.I.)\".   Patient is scheduled for EGD/Colonoscopy on 06/13/2018 @ 1015am.     Patient is seen through the [facility] ([facility]) - [facility] [place]([facility]) [[address] - [address] [place] [address]; phone: [phone], fax: [phone]; [employer]]  Refer to Media Tab - Progress Notes - 04/26/2018 entry. (Patient was seen by Dr. Justo Humphrey at [facility] on 04/02/2018.)    1400 hours - CCRN outreached to [facility] @ [facility]; spoke with Ella, triage nurse for Dr. Justo Humphrey.   She states that Dr. Humphrey is out of the office all this week, returning on Monday 05/21/2018.  She will relay the above information about upcoming GI procedures on 06/13/2018 and need for warfarin dosing/Lovenox bridging prior to procedures to him for further advice upon his return.     She states that patient has a future appointment with Dr. Humphrey on 07/02/2018.     PLAN:    No outreach to patient at this time.   She is closely followed for her cardiac issues by Dr. Humphrey.     Their care team will A) update writer with Dr. Humphrey' recommendations AND B) outreach to patient directly with recommendations.   CCRN reminded Ella that patient speaks Mandarin and will need an .  Ella verbalized understanding.         FYI to Dr. Lucero and [facility] - Triage staff    Cathy Bales, RN  [facility]  Clinic Care Coordinator - Prior [facility] and [facility] Locations   Direct:  [phone] (voicemail available)   "

## 2018-05-16 NOTE — TELEPHONE ENCOUNTER
See Care Coordinator note; please let them know that the communication with the Cardiology office has occurred and it looks like they will be making the recommendations

## 2018-05-23 ENCOUNTER — PATIENT OUTREACH (OUTPATIENT)
Dept: CARE COORDINATION | Facility: CLINIC | Age: 73
End: 2018-05-23

## 2018-05-24 NOTE — PROGRESS NOTES
Clinic Care Coordination Contact  Care Team Conversations    05/23/2018 - 1437 hours - CCRN received a VM from nurse Lisa with Dr. Humphrey' office at [facility] ([phone]).   She has an update for writer on Dr. Humphrey' recommendations and requests return call - ask for nurse station/Lisa.    05/24/2018 - CCRN outreached to Lisa and [facility] requesting return call to discuss case further.   Awaiting return call.     Cathy Bales, RN  [facility]  Clinic Care Coordinator - Prior [facility] Locations   Direct:  [phone] (voicemail available)

## 2018-05-29 NOTE — PROGRESS NOTES
Clinic Care Coordination Contact  Care Team Conversations    CCRN Left a VM for patient coordinator with Dr. Justo Humphrey at [facility].   Awaiting return call.     See below.     Cathy Bales, RN  [facility]  Clinic Care Coordinator - Prior [facility] Locations   Direct:  [phone] (voicemail available)

## 2018-05-30 NOTE — PROGRESS NOTES
Clinic Care Coordination Contact    Situation: Patient chart reviewed by care coordinator.    Background:  See below.     Assessment:     CCRN has not received any return calls from [facility] staff to date.     Patient was seen today (05/30/2018) at [facility] by Dr. Merari Ruth for an initial visit to discuss anticoagulation medication management.   (Refer to OV notes in Verified Person via [facility]).     Plan/Recommendations:       Vascular Medicine provider gave anticoagulation dosing/Lovenox bridging instructions to patient at her visit today.      FYI to PCP.     No further outreach by clinic care coordinator.     ENROLLMENT STATUS:  NOT A CANDIDATE    CARE COORDINATOR STATUS: Care Team is up to date.     Cathy Bales, RN  [facility]  Clinic Care Coordinator - Prior [facility] and [facility] Locations   Direct:  [phone] (voicemail available)

## 2018-06-05 ENCOUNTER — OFFICE VISIT (OUTPATIENT)
Dept: FAMILY MEDICINE | Facility: CLINIC | Age: 73
End: 2018-06-05
Payer: COMMERCIAL

## 2018-06-05 VITALS
HEIGHT: 65 IN | WEIGHT: 114.4 LBS | SYSTOLIC BLOOD PRESSURE: 122 MMHG | HEART RATE: 78 BPM | DIASTOLIC BLOOD PRESSURE: 60 MMHG | TEMPERATURE: 97.4 F | BODY MASS INDEX: 19.06 KG/M2 | OXYGEN SATURATION: 99 %

## 2018-06-05 DIAGNOSIS — I48.20 CHRONIC ATRIAL FIBRILLATION (H): ICD-10-CM

## 2018-06-05 DIAGNOSIS — E83.52 HYPERCALCEMIA: ICD-10-CM

## 2018-06-05 DIAGNOSIS — D50.9 IRON DEFICIENCY ANEMIA, UNSPECIFIED IRON DEFICIENCY ANEMIA TYPE: Primary | ICD-10-CM

## 2018-06-05 DIAGNOSIS — I50.9 CHRONIC HEART FAILURE, UNSPECIFIED HEART FAILURE TYPE (H): ICD-10-CM

## 2018-06-05 DIAGNOSIS — R73.03 PREDIABETES: ICD-10-CM

## 2018-06-05 LAB
ERYTHROCYTE [DISTWIDTH] IN BLOOD BY AUTOMATED COUNT: 22.4 % (ref 10–15)
HCT VFR BLD AUTO: 37.6 % (ref 35–47)
HGB BLD-MCNC: 11.4 G/DL (ref 11.7–15.7)
MCH RBC QN AUTO: 27.5 PG (ref 26.5–33)
MCHC RBC AUTO-ENTMCNC: 30.3 G/DL (ref 31.5–36.5)
MCV RBC AUTO: 91 FL (ref 78–100)
PLATELET # BLD AUTO: 202 10E9/L (ref 150–450)
RBC # BLD AUTO: 4.14 10E12/L (ref 3.8–5.2)
WBC # BLD AUTO: 4.1 10E9/L (ref 4–11)

## 2018-06-05 PROCEDURE — 99214 OFFICE O/P EST MOD 30 MIN: CPT | Performed by: FAMILY MEDICINE

## 2018-06-05 PROCEDURE — 85027 COMPLETE CBC AUTOMATED: CPT | Performed by: FAMILY MEDICINE

## 2018-06-05 PROCEDURE — 36415 COLL VENOUS BLD VENIPUNCTURE: CPT | Performed by: FAMILY MEDICINE

## 2018-06-05 PROCEDURE — 80069 RENAL FUNCTION PANEL: CPT | Performed by: FAMILY MEDICINE

## 2018-06-05 NOTE — MR AVS SNAPSHOT
After Visit Summary   6/5/2018    Devyn Link    MRN: [id number]           Patient Information     Date Of Birth          1945        Visit Information        Provider Department      6/5/2018 10:00 AM Prisca Lucero MD; LANGUAGE [facility]        Today's Diagnoses     Chronic heart failure, unspecified heart failure type (H)        Chronic atrial fibrillation (H)        Hypercalcemia        Iron deficiency anemia, unspecified iron deficiency anemia type           Follow-ups after your visit        Follow-up notes from your care team     Return in about 2 months (around 8/5/2018).      Your next 10 appointments already scheduled     Jun 13, 2018   Procedure with Rosario Alvarez MD   [facility] ([facility])    [address] [place] [address] [phone]           [facility] is located at [address] [place]            Aug 13, 2018  8:50 AM CDT   Office Visit with Prisca Lucero MD   [facility] ([facility]    [address] [facility] [address] [phone]           Bring a current list of meds and any records pertaining to this visit. For Physicals, please bring immunization records and any forms needing to be filled out. Please arrive 10 minutes early to complete paperwork.              Future tests that were ordered for you today     Open Future Orders        Priority Expected Expires Ordered    Ferritin Routine  1/5/2019 6/5/2018    CBC with platelets Routine  1/5/2019 6/5/2018    Iron and iron binding capacity Routine  1/5/2019 6/5/2018            Who to contact     If you have questions or need follow up information about today's clinic visit or your schedule please contact [facility] directly at [phone].  Normal or non-critical lab and imaging results will be communicated to you by MyChart, letter or phone within 4 business days  "after the clinic has received the results. If you do not hear from us within 7 days, please contact the clinic through [employer] or phone. If you have a critical or abnormal lab result, we will notify you by phone as soon as possible.  Submit refill requests through [employer] or call your pharmacy and they will forward the refill request to us. Please allow 3 business days for your refill to be completed.          Additional Information About Your Visit        [employer] Information     [employer] lets you send messages to your doctor, view your test results, renew your prescriptions, schedule appointments and more. To sign up, go to [email or web address] . Click on \"Log in\" on the left side of the screen, which will take you to the Welcome page. Then click on \"Sign up Now\" on the right side of the page.     You will be asked to enter the access code listed below, as well as some personal information. Please follow the directions to create your username and password.     Your access code is: [email or web address]  Expires: 2018 10:54 AM     Your access code will  in 90 days. If you need help or a new code, please call your [facility] clinic or [phone].        Care EveryWhere ID     This is your Care EveryWhere ID. This could be used by other organizations to access your [facility] medical records  [id number]        Your Vitals Were     Pulse Temperature Height Last Period Pulse Oximetry BMI (Body Mass Index)    78 97.4  F (36.3  C) (Oral) 5' 5.25\" (1.657 m) (LMP Unknown) 99% 18.89 kg/m2       Blood Pressure from Last 3 Encounters:   18 122/60   05/10/18 148/68   18 112/58    Weight from Last 3 Encounters:   18 114 lb 6.4 oz (51.9 kg)   05/10/18 115 lb 1.6 oz (52.2 kg)   18 117 lb 9.6 oz (53.3 kg)              We Performed the Following     **CBC with platelets FUTURE anytime     Renal panel (Alb, BUN, Ca, Cl, CO2, Creat, Gluc, Phos, K, Na)        Primary Care Provider Office Phone # Fax #    " Prisca Lucero [phone]       [address] [address] [facility] [address]        Equal Access to Services     BRENDENLADI SOPHIA : Hadkaren eun enamorado omar Sokareem, wapierreda luqadaha, qanikkyta kakimberda odalys, jose m tiptonfarooq augustina. So [facility] [phone].    ATENCIÓN: Si habla español, tiene a muir disposición servicios gratuitos de asistencia lingüística. Llame al [phone].    We comply with applicable federal civil rights laws and [place] laws. We do not discriminate on the basis of race, color, national origin, age, disability, sex, sexual orientation, or gender identity.            Thank you!     Thank you for choosing [facility]  for your care. Our goal is always to provide you with excellent care. Hearing back from our patients is one way we can continue to improve our services. Please take a few minutes to complete the written survey that you may receive in the mail after your visit with us. Thank you!             Your Updated Medication List - Protect others around you: Learn how to safely use, store and throw away your medicines at www.disposemymeds.org.          This list is accurate as of 6/5/18 11:18 AM.  Always use your most recent med list.                   Brand Name Dispense Instructions for use Diagnosis    amiodarone 200 MG tablet    PACERONE/CODARONE     200 mg        aspirin 81 MG tablet      Take 1 tablet by mouth daily.    Routine general medical examination at a health care facility, CAD (coronary artery disease)       calcium-vitamin D 500-125 MG-UNIT Tabs           carvedilol 3.125 MG tablet    COREG    60 tablet    Take 1 tablet (3.125 mg) by mouth 2 times daily (with meals)        desonide 0.05 % cream    DESOWEN    15 g    Apply sparingly to affected area three times daily for 14 days.    Localized macular rash       enoxaparin 40 MG/0.4ML injection    LOVENOX          famotidine 20 MG tablet    PEPCID    60 tablet    Take 1 tablet (20 mg) by  mouth 2 times daily        IRON SUPPLEMENT PO      Take 325 mg by mouth        ketoconazole 2 % cream    NIZORAL     Apply topically daily        LASIX 20 MG tablet   Generic drug:  furosemide     60 tablet    Take 20 mg by mouth Takes every other day    Heart failure (H), Hypertension goal BP (blood pressure) < 140/90       LIPITOR 20 MG tablet   Generic drug:  atorvastatin     90 tablet    Take 1 tablet (20 mg) by mouth daily        losartan 25 MG tablet    COZAAR     Take 25 mg by mouth daily        nitroGLYcerin 0.4 MG sublingual tablet    NITROSTAT    25 tablet    Place 1 tablet under the tongue every 5 minutes as needed for chest pain.    CAD (coronary artery disease)       spironolactone 25 MG tablet    ALDACTONE    30 tablet    Take 0.5 tablets (12.5 mg) by mouth daily        * warfarin 1 MG tablet    COUMADIN    30 tablet    Half tablet on Fri        * warfarin 1 MG tablet    COUMADIN    30 tablet    Mon, Tues, Wed, Thurs, Sat, Sun    Heart failure, unspecified heart failure chronicity, unspecified heart failure type (H)       * Notice:  This list has 2 medication(s) that are the same as other medications prescribed for you. Read the directions carefully, and ask your doctor or other care provider to review them with you.

## 2018-06-05 NOTE — PROGRESS NOTES
SUBJECTIVE:   Devyn Link is a 72 year old female who presents to clinic today for the following health issues:      Here to discuss lab results.        Problem list and histories reviewed & adjusted, as indicated.  Additional history:     See under ROS     Patient Active Problem List   Diagnosis     Hyperlipidemia LDL goal <70     CAD (coronary artery disease)     Long term current use of anticoagulant     Hypertension goal BP (blood pressure) < 140/90     Advanced directives, counseling/discussion     Bradycardia     Health Care Home     Chronic atrial fibrillation (H)     Heart failure (H)     Anemia     Pacemaker     Aortic valve replaced     Mitral valve replaced     Abnormal glucose     Prediabetes     Other osteoporosis without current pathological fracture     Iron deficiency anemia, unspecified iron deficiency anemia type       Current Outpatient Prescriptions   Medication Sig Dispense Refill     amiodarone (PACERONE/CODARONE) 200 MG tablet 200 mg       aspirin 81 MG tablet Take 1 tablet by mouth daily.  3     atorvastatin (LIPITOR) 20 MG tablet Take 1 tablet (20 mg) by mouth daily 90 tablet 1     calcium-vitamin D 500-125 MG-UNIT TABS        carvedilol (COREG) 3.125 MG tablet Take 1 tablet (3.125 mg) by mouth 2 times daily (with meals) 60 tablet      desonide (DESOWEN) 0.05 % cream Apply sparingly to affected area three times daily for 14 days. 15 g 0     enoxaparin (LOVENOX) 40 MG/0.4ML injection        famotidine (PEPCID) 20 MG tablet Take 1 tablet (20 mg) by mouth 2 times daily 60 tablet 1     Ferrous Sulfate (IRON SUPPLEMENT PO) Take 325 mg by mouth       furosemide (LASIX) 20 MG tablet Take 20 mg by mouth Takes every other day 60 tablet 1     ketoconazole (NIZORAL) 2 % cream Apply topically daily       losartan (COZAAR) 25 MG tablet Take 25 mg by mouth daily       nitroglycerin (NITROSTAT) 0.4 MG SL tablet Place 1 tablet under the tongue every 5 minutes as needed for chest pain. 25 tablet 0      "spironolactone (ALDACTONE) 25 MG tablet Take 0.5 tablets (12.5 mg) by mouth daily 30 tablet 1     warfarin (COUMADIN) 1 MG tablet Half tablet on Fri 30 tablet      warfarin (COUMADIN) 1 MG tablet Mon, Tues, Wed, Thurs, Sat, Sun 30 tablet          Reviewed and updated as needed this visit by clinical staff  Tobacco  Allergies  Meds  Med Hx  Surg Hx  Fam Hx  Soc Hx      Reviewed and updated as needed this visit by Provider         ROS:  Here with  and .    CONSTITUTIONAL:NEGATIVE for fever, chills, change in weight  RESP:feeling better. Not as short of breath; less dyspnea on exertion  CV: NEGATIVE for chest pain, palpitations or peripheral edema  GI: NEGATIVE for nausea, abdominal pain, heartburn, or change in bowel habits  PSYCHIATRIC: NEGATIVE for changes in mood or affect      Currently taking iron daily.  Light diarrhea and mild discomfort, but tolerating.    Less short of breath.   More energy.    Fasting today; not with last test.      OBJECTIVE:     /60 (BP Location: Right arm, Cuff Size: Adult Regular)  Pulse 78  Temp 97.4  F (36.3  C) (Oral)  Ht 5' 5.25\" (1.657 m)  Wt 114 lb 6.4 oz (51.9 kg)  LMP  (LMP Unknown)  SpO2 99%  BMI 18.89 kg/m2  Body mass index is 18.89 kg/(m^2).  GENERAL APPEARANCE: alert and no distress  RESP: lungs clear to auscultation - no rales, rhonchi or wheezes  CV: regular rates and rhythm; with crisp valve sounds  MS: no edema.   PSYCH: mentation appears normal and affect normal/bright      Hemoglobin   Date Value Ref Range Status   06/05/2018 11.4 (L) 11.7 - 15.7 g/dL Final     Comment:     Results confirmed by repeat test   05/10/2018 10.2 (L) 11.7 - 15.7 g/dL Final     Comment:     Results confirmed by repeat test   ]  Lab Results   Component Value Date    A1C 6.3 04/02/2018    A1C 6.2 09/01/2017    A1C 6.2 08/06/2015           ASSESSMENT/PLAN:     Iron deficiency anemia, unspecified iron deficiency anemia type  Much improved. Discussed could go to " qod iron; she wants to continue daily for now.  Will check iron studies again next visit.   She is having GI work up. Does have instructions around her bridging anticoagulation through her Cardiologist.  - **CBC with platelets FUTURE anytime  - Ferritin; Future  - CBC with platelets; Future  - Iron and iron binding capacity; Future    Hypercalcemia  Discussed this could be related to albumin; will recheck and apply correction if marked abnormal.  - Renal panel (Alb, BUN, Ca, Cl, CO2, Creat, Gluc, Phos, K, Na)    Chronic heart failure, unspecified heart failure type (H)  Stable. Does see Cardiology.  - Renal panel (Alb, BUN, Ca, Cl, CO2, Creat, Gluc, Phos, K, Na)    Chronic atrial fibrillation (H)  Stable; does see Cardiology.  - Renal panel (Alb, BUN, Ca, Cl, CO2, Creat, Gluc, Phos, K, Na)    Prediabetes:  Noting that this visit, sugar will be fasting.  We have been monitoring HgbA1C; discussed with them.      Prisca Lucero MD, MD  [facility]

## 2018-06-06 LAB
ALBUMIN SERPL-MCNC: 4.1 G/DL (ref 3.4–5)
ANION GAP SERPL CALCULATED.3IONS-SCNC: 8 MMOL/L (ref 3–14)
BUN SERPL-MCNC: 17 MG/DL (ref 7–30)
CALCIUM SERPL-MCNC: 10.6 MG/DL (ref 8.5–10.1)
CHLORIDE SERPL-SCNC: 111 MMOL/L (ref 94–109)
CO2 SERPL-SCNC: 25 MMOL/L (ref 20–32)
CREAT SERPL-MCNC: 0.64 MG/DL (ref 0.52–1.04)
GFR SERPL CREATININE-BSD FRML MDRD: 90 ML/MIN/1.7M2
GLUCOSE SERPL-MCNC: 99 MG/DL (ref 70–99)
PHOSPHATE SERPL-MCNC: 2.2 MG/DL (ref 2.5–4.5)
POTASSIUM SERPL-SCNC: 5 MMOL/L (ref 3.4–5.3)
SODIUM SERPL-SCNC: 144 MMOL/L (ref 133–144)

## 2018-06-13 ENCOUNTER — HOSPITAL ENCOUNTER (OUTPATIENT)
Facility: CLINIC | Age: 73
Discharge: HOME | End: 2018-06-13
Attending: INTERNAL MEDICINE | Admitting: INTERNAL MEDICINE
Payer: COMMERCIAL

## 2018-06-13 VITALS
OXYGEN SATURATION: 97 % | WEIGHT: 114 LBS | BODY MASS INDEX: 18.99 KG/M2 | RESPIRATION RATE: 14 BRPM | DIASTOLIC BLOOD PRESSURE: 60 MMHG | HEART RATE: 65 BPM | SYSTOLIC BLOOD PRESSURE: 111 MMHG | HEIGHT: 65 IN

## 2018-06-13 LAB
COLONOSCOPY: NORMAL
INR PPP: 1.14 (ref 0.86–1.14)
UPPER GI ENDOSCOPY: NORMAL

## 2018-06-13 PROCEDURE — 25000128 H RX IP 250 OP 636: Performed by: INTERNAL MEDICINE

## 2018-06-13 PROCEDURE — 85610 PROTHROMBIN TIME: CPT | Performed by: INTERNAL MEDICINE

## 2018-06-13 PROCEDURE — 25000125 ZZHC RX 250: Performed by: INTERNAL MEDICINE

## 2018-06-13 PROCEDURE — 43239 EGD BIOPSY SINGLE/MULTIPLE: CPT | Performed by: INTERNAL MEDICINE

## 2018-06-13 PROCEDURE — G0500 MOD SEDAT ENDO SERVICE >5YRS: HCPCS | Performed by: INTERNAL MEDICINE

## 2018-06-13 PROCEDURE — 36415 COLL VENOUS BLD VENIPUNCTURE: CPT | Performed by: INTERNAL MEDICINE

## 2018-06-13 PROCEDURE — 99153 MOD SED SAME PHYS/QHP EA: CPT | Performed by: INTERNAL MEDICINE

## 2018-06-13 PROCEDURE — 45378 DIAGNOSTIC COLONOSCOPY: CPT | Performed by: INTERNAL MEDICINE

## 2018-06-13 PROCEDURE — 88305 TISSUE EXAM BY PATHOLOGIST: CPT | Performed by: INTERNAL MEDICINE

## 2018-06-13 PROCEDURE — 88305 TISSUE EXAM BY PATHOLOGIST: CPT | Mod: 26 | Performed by: INTERNAL MEDICINE

## 2018-06-13 RX ORDER — NALOXONE HYDROCHLORIDE 0.4 MG/ML
.1-.4 INJECTION, SOLUTION INTRAMUSCULAR; INTRAVENOUS; SUBCUTANEOUS
Status: DISCONTINUED | OUTPATIENT
Start: 2018-06-13 | End: 2018-06-13 | Stop reason: HOSPADM

## 2018-06-13 RX ORDER — FENTANYL CITRATE 50 UG/ML
INJECTION, SOLUTION INTRAMUSCULAR; INTRAVENOUS PRN
Status: DISCONTINUED | OUTPATIENT
Start: 2018-06-13 | End: 2018-06-13 | Stop reason: HOSPADM

## 2018-06-13 RX ORDER — LIDOCAINE 40 MG/G
CREAM TOPICAL
Status: DISCONTINUED | OUTPATIENT
Start: 2018-06-13 | End: 2018-06-13 | Stop reason: HOSPADM

## 2018-06-13 RX ORDER — FLUMAZENIL 0.1 MG/ML
0.2 INJECTION, SOLUTION INTRAVENOUS
Status: DISCONTINUED | OUTPATIENT
Start: 2018-06-13 | End: 2018-06-13 | Stop reason: HOSPADM

## 2018-06-13 RX ORDER — ONDANSETRON 2 MG/ML
4 INJECTION INTRAMUSCULAR; INTRAVENOUS EVERY 6 HOURS PRN
Status: DISCONTINUED | OUTPATIENT
Start: 2018-06-13 | End: 2018-06-13 | Stop reason: HOSPADM

## 2018-06-13 RX ORDER — ONDANSETRON 4 MG/1
4 TABLET, ORALLY DISINTEGRATING ORAL EVERY 6 HOURS PRN
Status: DISCONTINUED | OUTPATIENT
Start: 2018-06-13 | End: 2018-06-13 | Stop reason: HOSPADM

## 2018-06-13 RX ORDER — ONDANSETRON 2 MG/ML
4 INJECTION INTRAMUSCULAR; INTRAVENOUS
Status: DISCONTINUED | OUTPATIENT
Start: 2018-06-13 | End: 2018-06-13 | Stop reason: HOSPADM

## 2018-06-13 NOTE — LETTER
May 29, 2018      Devyn Link  [address] [place] [address]        Dear Devyn,     Thank you for choosing [facility]. You are scheduled for the following services.     Date:  6-13-18       Procedure: UPPER ENDOSCOPY & COLONOSCOPY  Doctor:  Kim          Arrival Time:   0945  *check in at Emergency/Endoscopy desk*  Procedure Time:   1015  Location:   [facility]        Endoscopy Department, First Floor (Enter through ER Doors) *         [address] [place], [place] [address]      [phone] or [phone] ([facility]) to reschedule      MIRALAX -GATORADE  PREP    Upper Endoscopy or Esophagogastroduodenoscopy (EGD) is a test performed to evaluate symptoms of persistent abdominal pain, nausea, vomiting or difficulty swallowing. It may also be used to treat various conditions of the upper gastrointestinal (GI) tract, such as bleeding, narrowing or abnormal growths. During the procedure, a doctor examines the lining of your esophagus, stomach and the first part of your small intestine through a thin, flexible tube called an endoscope. If growths or other abnormalities are found during the procedure, the doctor may remove the abnormal tissue (biopsy) for further examination.     Colonoscopy is the most accurate test to detect colon polyps and colon cancer; and the only test where polyps can be removed. During this procedure, a doctor examines the lining of your large intestine and rectum through a flexible tube.     Transportation  Arrange for a ride for the day of your procedures with a responsible adult.  A taxi ride is not an option unless you are accompanied by a responsible adult. If you fail to arrange transportation with a responsible adult, your procedure will be cancelled and rescheduled.    Purchase the  following supplies at your local pharmacy:  - 2 (two) bisacodyl tablets: each tablet contains 5 mg.  (Dulcolax  laxative NOT Dulcolax  stool  softener)   - 1 (one) 8.3 oz bottle of Polyethylene Glycol (PEG) 3350 Powder   (MiraLAX , Smooth LAX , ClearLAX  or equivalent)  - 64 oz Gatorade    Regular Gatorade, Gatorade G2 , Powerade , Powerade Zero  or Pedialyte  is acceptable. Red colored flavors are not allowed; all other colors (yellow, green, orange, purple and blue) are okay. It is also okay to buy two 2.12 oz packets of powdered Gatorade that can be mixed with water to a total volume of 64 oz of liquid.  - 1 (one) 10 oz bottle of Magnesium Citrate (Red colored flavors are not allowed)  It is also okay for you to use a 0.5 oz package of powdered magnesium citrate (17 g) mixed with 10 oz of water.      PREPARATION FOR COLONOSCOPY  7 days before:    Discontinue fiber supplements and medications containing iron. This includes Metamucil  and Fibercon ; and multivitamins with iron.  3 days before:    Begin a low-fiber diet. A low-fiber diet helps making the cleanout more effective.     Examples of a low-fiber diet include (but are not limited to): white bread, white rice, pasta, crackers, fish, chicken, eggs, ground beef, creamy peanut butter, cooked/steamed/boiled vegetables, canned fruit, bananas, melons, milk, plain yogurt cheese, salad dressing and other condiments.     The following are not allowed on a low-fiber diet: seeds, nuts, popcorn, bran, whole wheat, corn, quinoa, raw fruits and vegetables, berries and dried fruit, beans and lentils.    For additional details on low-fiber diet, please refer to the table on the last page.  2 days before:    Continue the low-fiber diet.     Drink at least 8 glasses of water throughout the day.     Stop eating solid foods at 11:45 pm.  1 day before:    In the morning: begin a clear liquid diet (liquids you can see through).     Examples of a clear liquid diet include: water, clear broth or bouillon, Gatorade, Pedialyte or Powerade, carbonated and non-carbonated soft drinks (Sprite , 7-Up , ginger ale), strained  fruit juices without pulp (apple, white grape, white cranberry), Jell-O  and popsicles.     The following are not allowed on a clear liquid diet: red liquids, alcoholic beverages, coffee, dairy products (milk, creamer, and yogurt), protein shakes, creamy broths, juice with pulp and chewing tobacco.    At noon: take 2 (two) bisacodyl tablets     At 4 (and no later than 6pm): start drinking the Miralax-Gatorade preparation (8.3 oz of Miralax mixed with 64 oz of Gatorade in a large pitcher). Drink 1(one) 8 oz glass every 15 minutes thereafter, until the mixture is gone.      COLON CLEANSING TIPS: drink adequate amounts of fluids before and after your colon cleansing to prevent dehydration. Stay near a toilet because you will have diarrhea. Even if you are sitting on the toilet, continue to drink the cleansing solution every 15 minutes. If you feel nauseous or vomit, rinse your mouth with water, take a 15 to 30-minute-break and then continue drinking the solution. You will be uncomfortable until the stool has flushed from your colon (in about 2 to 4 hours). You may feel chilled.    Day of your procedure  You may take all of your morning medications including blood pressure medications, blood thinners (if you have not been instructed to stop these by our office), methadone, anti-seizure medications with sips of water 3 hours prior to your procedure or earlier. Do not take insulin or vitamins prior to your procedure. Continue the clear liquid diet.   4 hours prior: drink 10 oz of magnesium citrate. It may be easier to drink it with a straw.    STOP consuming all liquids after that.     Do not take anything by mouth during this time.     Allow extra time to travel to your procedure as you may need to stop and use a restroom along the way.  You are ready for the procedure, if you followed all instructions and your stool is no longer formed, but clear or yellow liquid. If you are unsure whether your colon is clean, please  call our office at [phone] before you leave for your appointment.  Bring the following to your procedure:  - Insurance Card/Photo ID.   - List of current medications including over-the-counter medications and supplements.   - Your rescue inhaler if you currently use one to control asthma.    Canceling or rescheduling your appointment:   If you must cancel or reschedule your appointment, please call [phone] as soon as possible.    COLONOSCOPY PRE-PROCEDURE CHECKLIST  If you have diabetes, ask your regular doctor for diet and medication restrictions.  If you take an anticoagulant or anti-platelet medication (such as Coumadin , Lovenox , Pradaxa , Xarelto , Eliquis , etc.), please call your primary doctor for advice on holding this medication.  If you take aspirin you may continue to do so.  If you are or may be pregnant, please discuss the risks and benefits of this procedure with your doctor.    What happens during a colonoscopy?  Plan to spend up to two hours, starting at registration time, at the endoscopy center the day of your procedure. The colonoscopy takes an average of 15 to 30 minutes. Recovery time is about 30 minutes.     Before the exam:    You will change into a gown.    Your medical history and medication list will be reviewed with you, unless that has been done over the phone prior to the procedure.     A nurse will insert an intravenous (IV) line into your hand or arm.    The doctor will meet with you and will give you a consent form to sign.    During the exam:     Medicine will be given through the IV line to help you relax.     Your heart rate and oxygen levels will be monitored. If your blood pressure is low, you may be given fluids through the IV line.     The doctor will insert a flexible hollow tube, called a colonoscope, into your rectum. The scope will be advanced slowly through the large intestine (colon).    You may have a feeling of fullness or pressure.     If an abnormal tissue  or a polyp is found, the doctor may remove it through the endoscope for closer examination, or biopsy. Tissue removal is painless    After the exam:           Any tissue samples removed during the exam will be sent to a lab for evaluation. It may take 5-7 working days for you to be notified of the results.     A nurse will provide you with complete discharge instructions before you leave the endoscopy center. Be sure to ask the nurse for specific instructions if you take blood thinners such as Aspirin, Coumadin or Plavix.     The doctor will prepare a full report for you and for the physician who referred you for the procedure.     Your doctor will talk with you about the initial results of your exam.      Medication given during the exam will prohibit you from driving for the rest of the day.     Following the exam, you may resume your normal diet. Your first meal should be light, no greasy foods. Avoid alcohol until the next day.     You may resume your regular activities the day after the procedure.       LOW-FIBER DIET  Foods RECOMMENDED Foods to AVOID   Breads, Cereal, Rice and Pasta:   White bread, rolls, biscuits, croissant and joon toast.   Waffles, Korean toast and pancakes.   White rice, noodles, pasta, macaroni and peeled cooked potatoes.   Plain crackers and saltines.   Cooked cereals: farina, cream of rice.   Cold cereals: Puffed Rice , Rice Krispies , Corn Flakes  and Special K    Breads, Cereal, Rice and Pasta:   Breads or rolls with nuts, seeds or fruit.   Whole wheat, pumpernickel, rye breads and cornbread.   Potatoes with skin, brown or wild rice, and kasha (buckwheat).     Vegetables:   Tender cooked and canned vegetables without seeds: carrots, asparagus tips, green or wax beans, pumpkin, spinach, lima beans. Vegetables:   Raw or steamed vegetables (w/ or without seeds)   Sauerkraut.   Winter squash, peas, broccoli, Brussel sprouts, cabbage, onions, cauliflower, baked beans, peas and corn.    Fruits:   Strained fruit juice.   Canned fruit, except pineapple.   Ripe bananas and melon. Fruits:   Prunes and prune juice.   Raw fruits.   Dried fruits: figs, dates and raisins.   Milk/Dairy:   Milk: plain or flavored; yogurt; ice cream.   Custard; cheese and cottage cheese Milk/Dairy:     Meat and other proteins:   Ground, well-cooked tender beef, lamb, ham, veal, pork, fish, poultry, organ meats and eggs.   Peanut butter without nuts. Meat and other proteins:   Tough, fibrous meats with gristle.   Dry beans and peas; lentils and Tofu   Peanut butter with nuts.   Fats, Snack, Sweets, Condiments and Beverages:   Margarine, butter, oils, mayonnaise, sour cream and salad dressing, plain gravy.   Sugar, hard candy, clear jelly, honey and syrup.   Spices, cooked herbs, bouillon, broth and soups made with allowed vegetable, ketchup and mustard.   Coffee, tea and carbonated drinks.   Plain cakes, cookies and pretzels.   Gelatin, plain puddings, custard, ice cream, sherbet and popsicles. Fats, Snack, Sweets, Condiments and Beverages:   Nuts, seeds and coconut.   Jam, marmalade and preserves.   Pickles, olives, relish and horseradish.   All desserts containing nuts, seeds, dried fruit and coconut; or made from whole grains or bran.   Candy made with nuts or seeds.   Popcorn.             DIRECTIONS TO THE ENDOSCOPY DEPARTMENT  From the north ([facility])  Take [address] south, exit on [address] [address]. Get into the left hand melissa, turn left (east), go one-half mile to [address] and turn left. Go north to the first stoplight, take a right on [address] and follow it to the Emergency entrance.    From the south ([facility])  Take 35N to the 35E split and exit on [address] [address]. On [address] [address], turn left (west) to [address]. Turn right (north) on [address]. Go north to the first stoplight, take a right on [address] and follow it to the Emergency entrance.    From  the east via 35E ([facility])  Take [address] south to [address]  exit. Turn right on [address]. Go west to [address]. Turn right (north) on [address]. Go to the first stoplight, take a right and follow on [address] to the Emergency entrance.    From the east via [address] ([facility])  Take [address] to [address]. Turn left (south) on [address] to [address]. Turn left (east) on [address] and follow it to the Emergency entrance.    From the west via [address] ([place])  Take [address] east to [address]. Turn right (south) on [address] to [address]. Turn left (east) on [address] and follow it to the Emergency entrance.

## 2018-06-13 NOTE — DISCHARGE INSTRUCTIONS
HIGH FIBER DIET  Fiber is present in all fruits, vegetables, cereals and grains. Fiber passes through the body undigested. A high fiber diet helps food move through the intestinal tract. The added bulk is helpful in preventing constipation. In people with diverticulosis it serves to clean out the pouches along the colon wall while preventing new ones from forming. A high fiber diet also reduces the risk of colon cancer, decreases blood cholesterol and prevents high blood sugar in people with diabetes.    The foods listed below are high in fiber and should be included in your diet. If you are not used to high fiber foods, start with 1 or 2 foods from this list. Every 3-4 days add a new one to your diet until you are eating 4 high fiber foods per day. This should give you 20-35 Gm of fiber/day. It is also important to drink a lot of water when you are on this diet (6-8 glasses a day). Water causes the fiber to swell and increases the benefit.    FOODS HIGH IN DIETARY FIBER:  BREADS: Made with 100% whole wheat flour; shonna, wheat or rye crackers; tortillas, bran muffins  CEREALS: Whole grain cereal with bran (Chex, Raisin Bran, Corn Bran), oatmeal, rolled oats, granola, wheat flakes, brown rice  NUTS: Any nuts  FRUITS: All fresh fruits along with edible skins, (bananas, citrus fruit, mangoes, pears, prunes, raisins, apples, pineapple, apricot, melon, jams and marmalades), fruit juices (especially prune juice)  VEGETABLES: All types, preferably raw or lightly cooked: especially, celery, eggplant, potatoes, spinach, broccoli, brussel sprouts, winter squash, carrots, cauliflower, soybeans, lentils, fresh and dried beans of all kinds  OTHER: Popcorn, any spices      [id number] Rhys Dominguez, [address] [place] [address], [place] [address]. All rights reserved. This information is not intended as a substitute for professional medical care. Always follow your healthcare professional's instructions.    HEMORRHOIDS,  External      A hemorrhoid is a local swelling of the veins around the rectum. These most often occur from repeated forceful straining during bowel movements or heavy lifting. It may also occur in the last few months of pregnancy. A hemorrhoid feels like a soft lump. It may itch from time to time. When it is inflamed it becomes hard and very painful.    HOME CARE:  1. SITZ BATHS: Sit in a tub filled with about 6 inches of hot water. Allow the water to run in order to keep it hot for a total of 10-15 minutes. Repeat this three times a day until pain is relieved.  2. Keep your stools soft to avoid the need to strain when having a bowel movement. Unless another medicine was prescribed, try the following:  IF YOU ARE CONSTIPATED: You may use over-the-counter laxatives such as MILK OF MAGNESIA (mild acting) or, DULCOLAX (if stronger action is needed).  IF YOU ARE NOT CONSTIPATED but stools are hard, try taking Colace (docusate sodium) which is a stool softener. This will soften stools without producing diarrhea. Drinking extra fluids may also help.  3. The use of creams applied to the hemorrhoid itself, such as ANUSOL or PREPARATION H, will be helpful to reduce pain and itching, and speed healing.    PREVENTION:  Avoid straining on the toilet by keeping stools soft. Increasing FIBER in your diet (fruits, cereals, vegetables and grains) will promote healthy bowel movement. If this is not working, you may use METAMUCIL and similar products. These are over-the-counter fiber supplements. You must drink extra fluids when taking these to avoid constipation.  FOLLOW UP with your doctor if you do not begin to respond to the above treatment within the next few days.    GET PROMPT MEDICAL ATTENTION if any of the following occur:      Large amount of rectal bleeding (more than 1 cup of blood in 24 hours)    Increasing rectal pain or rectal pain that continues for more than three days of treatment    Weakness, dizziness or  fainting    Vomiting blood (red or black color)          [id number] Rhys Dominguez, [address] [facility], [place] [address]. All rights reserved. This information is not intended as a substitute for professional medical care. Always follow your healthcare professional's instructions.    Understanding H. pylori and Ulcers  Traditionally, ulcers, or sores in the lining of your digestive tract, were thought to be caused by too much spicy food, stress, or an anxious personality. We now know that most ulcers are probably due to infection with bacteria known as Helicobacter pylori (H. pylori).     H. pylori invade and disturb the lining of the digestive tract. Acid may weaken the area, causing an ulcer.      Common Ulcer Symptoms  Burning, cramping, or hunger-like pain in the stomach area, often one to three hours after a meal or in the middle of the night  Pain that gets better or worse with eating  Nausea or vomiting  Black, tarry, or bloody stools (which means the ulcer is bleeding)  Or you may have no symptoms.     An ulcer can form in two areas of the digestive tract; the stomach and the duodenum (where the stomach meets the small intestine).      Your Evaluation  An evaluation by your doctor can show if you have an ulcer and determine whether it was caused by H. pylori. Your doctor may ask you questions, examine you, and possibly do some tests. These may include:  A special X-ray called a barium upper gastrointestinal series, to help locate an ulcer. During the test, you drink a chalky liquid. This liquid helps the ulcer show up on the X-ray.  An endoscopic exam, done with a long tube passed through your mouth into your stomach, to give the doctor a closer look at your ulcer. You will be lightly sedated for this procedure. Your doctor can also take a tissue sample to test for H. pylori.  Blood, stool, and breath tests are also available to show whether you have H. pylori in your digestive tract.  Your  Treatment  To kill H. pylori so your ulcer can heal, your doctor will probably prescribe antibiotics. Other ulcer medications that help reduce stomach acid may also be prescribed as well. Testing after treatment is recommended to be sure the H. pylori infection is gone. Usually, killing H. pylori helps keep the ulcer from returning.    [id number] Rhys Dominguez, [address] [place] [address], [place] [address]. All rights reserved. This information is not intended as a substitute for professional medical care. Always follow your healthcare professional's instructions.

## 2018-06-13 NOTE — IP AVS SNAPSHOT
MRN:[id number]                      After Visit Summary   6/13/2018    Devyn Link    MRN: [id number]           Thank you!     Thank you for choosing [facility] for your care. Our goal is always to provide you with excellent care. Hearing back from our patients is one way we can continue to improve our services. Please take a few minutes to complete the written survey that you may receive in the mail after you visit. If you would like to speak to someone directly about your visit please contact Patient Relations at [phone]. Thank you!          Patient Information     Date Of Birth          1945        About your hospital stay     You were admitted on:  June 13, 2018 You last received care in the:  [facility]    You were discharged on:  June 13, 2018       Who to Call     For medical emergencies, please call 911.  For non-urgent questions about your medical care, please call your primary care provider or clinic, [phone]  For questions related to your surgery, please call your surgery clinic        Attending Provider     Provider Specialty    Rosario Alvarez MD Gastroenterology       Primary Care Provider Office Phone # Fax #    Prisca Lucero [phone]      Your next 10 appointments already scheduled     Aug 13, 2018  8:50 AM CDT   Office Visit with Prisca Lucero MD   [facility] ([facility]    [address] [address] [phone]           Bring a current list of meds and any records pertaining to this visit. For Physicals, please bring immunization records and any forms needing to be filled out. Please arrive 10 minutes early to complete paperwork.              Further instructions from your care team         HIGH FIBER DIET  Fiber is present in all fruits, vegetables, cereals and grains. Fiber passes through the body undigested. A high fiber diet helps food move through the  intestinal tract. The added bulk is helpful in preventing constipation. In people with diverticulosis it serves to clean out the pouches along the colon wall while preventing new ones from forming. A high fiber diet also reduces the risk of colon cancer, decreases blood cholesterol and prevents high blood sugar in people with diabetes.    The foods listed below are high in fiber and should be included in your diet. If you are not used to high fiber foods, start with 1 or 2 foods from this list. Every 3-4 days add a new one to your diet until you are eating 4 high fiber foods per day. This should give you 20-35 Gm of fiber/day. It is also important to drink a lot of water when you are on this diet (6-8 glasses a day). Water causes the fiber to swell and increases the benefit.    FOODS HIGH IN DIETARY FIBER:  BREADS: Made with 100% whole wheat flour; shonna, wheat or rye crackers; tortillas, bran muffins  CEREALS: Whole grain cereal with bran (Chex, Raisin Bran, Corn Bran), oatmeal, rolled oats, granola, wheat flakes, brown rice  NUTS: Any nuts  FRUITS: All fresh fruits along with edible skins, (bananas, citrus fruit, mangoes, pears, prunes, raisins, apples, pineapple, apricot, melon, jams and marmalades), fruit juices (especially prune juice)  VEGETABLES: All types, preferably raw or lightly cooked: especially, celery, eggplant, potatoes, spinach, broccoli, brussel sprouts, winter squash, carrots, cauliflower, soybeans, lentils, fresh and dried beans of all kinds  OTHER: Popcorn, any spices      [id number] [employer] [address], [place] [address]. All rights reserved. This information is not intended as a substitute for professional medical care. Always follow your healthcare professional's instructions.    HEMORRHOIDS, External      A hemorrhoid is a local swelling of the veins around the rectum. These most often occur from repeated forceful straining during bowel movements or heavy lifting. It may  also occur in the last few months of pregnancy. A hemorrhoid feels like a soft lump. It may itch from time to time. When it is inflamed it becomes hard and very painful.    HOME CARE:  1. SITZ BATHS: Sit in a tub filled with about 6 inches of hot water. Allow the water to run in order to keep it hot for a total of 10-15 minutes. Repeat this three times a day until pain is relieved.  2. Keep your stools soft to avoid the need to strain when having a bowel movement. Unless another medicine was prescribed, try the following:  IF YOU ARE CONSTIPATED: You may use over-the-counter laxatives such as MILK OF MAGNESIA (mild acting) or, DULCOLAX (if stronger action is needed).  IF YOU ARE NOT CONSTIPATED but stools are hard, try taking Colace (docusate sodium) which is a stool softener. This will soften stools without producing diarrhea. Drinking extra fluids may also help.  3. The use of creams applied to the hemorrhoid itself, such as ANUSOL or PREPARATION H, will be helpful to reduce pain and itching, and speed healing.    PREVENTION:  Avoid straining on the toilet by keeping stools soft. Increasing FIBER in your diet (fruits, cereals, vegetables and grains) will promote healthy bowel movement. If this is not working, you may use METAMUCIL and similar products. These are over-the-counter fiber supplements. You must drink extra fluids when taking these to avoid constipation.  FOLLOW UP with your doctor if you do not begin to respond to the above treatment within the next few days.    GET PROMPT MEDICAL ATTENTION if any of the following occur:      Large amount of rectal bleeding (more than 1 cup of blood in 24 hours)    Increasing rectal pain or rectal pain that continues for more than three days of treatment    Weakness, dizziness or fainting    Vomiting blood (red or black color)          3266-5742 [facility], [address] [place] [address], [place] [address]. All rights reserved. This information is not intended as a  substitute for professional medical care. Always follow your healthcare professional's instructions.    Understanding H. pylori and Ulcers  Traditionally, ulcers, or sores in the lining of your digestive tract, were thought to be caused by too much spicy food, stress, or an anxious personality. We now know that most ulcers are probably due to infection with bacteria known as Helicobacter pylori (H. pylori).     H. pylori invade and disturb the lining of the digestive tract. Acid may weaken the area, causing an ulcer.      Common Ulcer Symptoms  Burning, cramping, or hunger-like pain in the stomach area, often one to three hours after a meal or in the middle of the night  Pain that gets better or worse with eating  Nausea or vomiting  Black, tarry, or bloody stools (which means the ulcer is bleeding)  Or you may have no symptoms.     An ulcer can form in two areas of the digestive tract; the stomach and the duodenum (where the stomach meets the small intestine).      Your Evaluation  An evaluation by your doctor can show if you have an ulcer and determine whether it was caused by H. pylori. Your doctor may ask you questions, examine you, and possibly do some tests. These may include:  A special X-ray called a barium upper gastrointestinal series, to help locate an ulcer. During the test, you drink a chalky liquid. This liquid helps the ulcer show up on the X-ray.  An endoscopic exam, done with a long tube passed through your mouth into your stomach, to give the doctor a closer look at your ulcer. You will be lightly sedated for this procedure. Your doctor can also take a tissue sample to test for H. pylori.  Blood, stool, and breath tests are also available to show whether you have H. pylori in your digestive tract.  Your Treatment  To kill H. pylori so your ulcer can heal, your doctor will probably prescribe antibiotics. Other ulcer medications that help reduce stomach acid may also be prescribed as well. Testing  "after treatment is recommended to be sure the H. pylori infection is gone. Usually, killing H. pylori helps keep the ulcer from returning.    8822-6510 [employer] [facility], [address] [place] [address], [place] [address]. All rights reserved. This information is not intended as a substitute for professional medical care. Always follow your healthcare professional's instructions.    Pending Results     No orders found from 2018 to 2018.            Admission Information     Date & Time Provider Department Dept. Phone    2018 Rosario Alvarez MD [facility] [phone]      Your Vitals Were     Blood Pressure Pulse Respirations Height Weight Last Period    111/63 65 16 1.651 m (5' 5\") 51.7 kg (114 lb) (LMP Unknown)    Pulse Oximetry BMI (Body Mass Index)                97% 18.97 kg/m2          MyChart Information     [employer] lets you send messages to your doctor, view your test results, renew your prescriptions, schedule appointments and more. To sign up, go to [email or web address] . Click on \"Log in\" on the left side of the screen, which will take you to the Welcome page. Then click on \"Sign up Now\" on the right side of the page.     You will be asked to enter the access code listed below, as well as some personal information. Please follow the directions to create your username and password.     Your access code is: [email or web address]  Expires: 2018 10:54 AM     Your access code will  in 90 days. If you need help or a new code, please call your [place] clinic or [phone].        Care EveryWhere ID     This is your Care EveryWhere ID. This could be used by other organizations to access your [place] medical records  [id number]        Equal Access to Services     GRANT CORTES : Lety Méndez, melba valenzuela, jose m molina. So [facility] [phone].    ATENCIÓN: Si habla español, tiene a muir disposición servicios " ashleigh de asistencia lingüística. Sarath dumont [phone].    We comply with applicable federal civil rights laws and [place] laws. We do not discriminate on the basis of race, color, national origin, age, disability, sex, sexual orientation, or gender identity.               Review of your medicines      UNREVIEWED medicines. Ask your doctor about these medicines        Dose / Directions    amiodarone 200 MG tablet   Commonly known as:  PACERONE/CODARONE        Dose:  200 mg   200 mg   Refills:  0         CONTINUE these medicines which have NOT CHANGED        Dose / Directions    aspirin 81 MG tablet   Used for:  Routine general medical examination at a health care facility, CAD (coronary artery disease)        Dose:  1 tablet   Take 1 tablet by mouth daily.   Refills:  3       calcium-vitamin D 500-125 MG-UNIT Tabs        Refills:  0       carvedilol 3.125 MG tablet   Commonly known as:  COREG        Dose:  3.125 mg   Take 1 tablet (3.125 mg) by mouth 2 times daily (with meals)   Quantity:  60 tablet   Refills:  0       desonide 0.05 % cream   Commonly known as:  DESOWEN   Used for:  Localized macular rash        Apply sparingly to affected area three times daily for 14 days.   Quantity:  15 g   Refills:  0       enoxaparin 40 MG/0.4ML injection   Commonly known as:  LOVENOX        Refills:  0       famotidine 20 MG tablet   Commonly known as:  PEPCID        Dose:  20 mg   Take 1 tablet (20 mg) by mouth 2 times daily   Quantity:  60 tablet   Refills:  1       IRON SUPPLEMENT PO        Dose:  325 mg   Take 325 mg by mouth   Refills:  0       ketoconazole 2 % cream   Commonly known as:  NIZORAL        Apply topically daily   Refills:  0       LASIX 20 MG tablet   Used for:  Heart failure (H), Hypertension goal BP (blood pressure) < 140/90   Generic drug:  furosemide        Dose:  20 mg   Take 20 mg by mouth Takes every other day   Quantity:  60 tablet   Refills:  1       LIPITOR 20 MG tablet   Generic drug:   atorvastatin        Dose:  20 mg   Take 1 tablet (20 mg) by mouth daily   Quantity:  90 tablet   Refills:  1       losartan 25 MG tablet   Commonly known as:  COZAAR        Dose:  25 mg   Take 25 mg by mouth daily   Refills:  0       nitroGLYcerin 0.4 MG sublingual tablet   Commonly known as:  NITROSTAT   Used for:  CAD (coronary artery disease)        Dose:  0.4 mg   Place 1 tablet under the tongue every 5 minutes as needed for chest pain.   Quantity:  25 tablet   Refills:  0       spironolactone 25 MG tablet   Commonly known as:  ALDACTONE        Dose:  12.5 mg   Take 0.5 tablets (12.5 mg) by mouth daily   Quantity:  30 tablet   Refills:  1       * warfarin 1 MG tablet   Commonly known as:  COUMADIN        Half tablet on Fri   Quantity:  30 tablet   Refills:  0       * warfarin 1 MG tablet   Commonly known as:  COUMADIN   Used for:  Heart failure, unspecified heart failure chronicity, unspecified heart failure type (H)        Mon, Tues, Wed, Thurs, Sat, Sun   Quantity:  30 tablet   Refills:  0       * Notice:  This list has 2 medication(s) that are the same as other medications prescribed for you. Read the directions carefully, and ask your doctor or other care provider to review them with you.             Protect others around you: Learn how to safely use, store and throw away your medicines at www.disposemymeds.org.             Medication List: This is a list of all your medications and when to take them. Check marks below indicate your daily home schedule. Keep this list as a reference.      Medications           Morning Afternoon Evening Bedtime As Needed    amiodarone 200 MG tablet   Commonly known as:  PACERONE/CODARONE   200 mg                                aspirin 81 MG tablet   Take 1 tablet by mouth daily.                                calcium-vitamin D 500-125 MG-UNIT Tabs                                carvedilol 3.125 MG tablet   Commonly known as:  COREG   Take 1 tablet (3.125 mg) by mouth 2 times  daily (with meals)                                desonide 0.05 % cream   Commonly known as:  DESOWEN   Apply sparingly to affected area three times daily for 14 days.                                enoxaparin 40 MG/0.4ML injection   Commonly known as:  LOVENOX                                famotidine 20 MG tablet   Commonly known as:  PEPCID   Take 1 tablet (20 mg) by mouth 2 times daily                                IRON SUPPLEMENT PO   Take 325 mg by mouth                                ketoconazole 2 % cream   Commonly known as:  NIZORAL   Apply topically daily                                LASIX 20 MG tablet   Take 20 mg by mouth Takes every other day   Generic drug:  furosemide                                LIPITOR 20 MG tablet   Take 1 tablet (20 mg) by mouth daily   Generic drug:  atorvastatin                                losartan 25 MG tablet   Commonly known as:  COZAAR   Take 25 mg by mouth daily                                nitroGLYcerin 0.4 MG sublingual tablet   Commonly known as:  NITROSTAT   Place 1 tablet under the tongue every 5 minutes as needed for chest pain.                                spironolactone 25 MG tablet   Commonly known as:  ALDACTONE   Take 0.5 tablets (12.5 mg) by mouth daily                                * warfarin 1 MG tablet   Commonly known as:  COUMADIN   Half tablet on Fri                                * warfarin 1 MG tablet   Commonly known as:  COUMADIN   Mon, Tues, Wed, Thurs, Sat, Sun                                * Notice:  This list has 2 medication(s) that are the same as other medications prescribed for you. Read the directions carefully, and ask your doctor or other care provider to review them with you.

## 2018-06-14 ENCOUNTER — TRANSFERRED RECORDS (OUTPATIENT)
Dept: HEALTH INFORMATION MANAGEMENT | Facility: CLINIC | Age: 73
End: 2018-06-14

## 2018-06-14 LAB
COPATH REPORT: NORMAL
CREAT SERPL-MCNC: 0.8 MG/DL (ref 0.57–1.11)
GFR SERPL CREATININE-BSD FRML MDRD: >60 ML/MIN/1.73M2
GLUCOSE SERPL-MCNC: NORMAL MG/DL (ref 70–99)
INR PPP: 1.3
POTASSIUM SERPL-SCNC: 4.8 MMOL/L (ref 3.5–5)

## 2018-06-15 LAB — EJECTION FRACTION: 56

## 2018-06-22 ENCOUNTER — OFFICE VISIT (OUTPATIENT)
Dept: FAMILY MEDICINE | Facility: CLINIC | Age: 73
End: 2018-06-22
Payer: COMMERCIAL

## 2018-06-22 VITALS
WEIGHT: 114.1 LBS | SYSTOLIC BLOOD PRESSURE: 120 MMHG | OXYGEN SATURATION: 96 % | HEIGHT: 65 IN | BODY MASS INDEX: 19.01 KG/M2 | TEMPERATURE: 97.8 F | DIASTOLIC BLOOD PRESSURE: 56 MMHG | HEART RATE: 69 BPM | RESPIRATION RATE: 16 BRPM

## 2018-06-22 DIAGNOSIS — D50.9 IRON DEFICIENCY ANEMIA, UNSPECIFIED IRON DEFICIENCY ANEMIA TYPE: ICD-10-CM

## 2018-06-22 DIAGNOSIS — Z95.2 MITRAL VALVE REPLACED: ICD-10-CM

## 2018-06-22 DIAGNOSIS — K25.4 GASTROINTESTINAL HEMORRHAGE ASSOCIATED WITH GASTRIC ULCER: ICD-10-CM

## 2018-06-22 DIAGNOSIS — E83.52 HYPERCALCEMIA: ICD-10-CM

## 2018-06-22 DIAGNOSIS — Z95.2 AORTIC VALVE REPLACED: ICD-10-CM

## 2018-06-22 DIAGNOSIS — Z79.01 ANTICOAGULATED ON COUMADIN: ICD-10-CM

## 2018-06-22 DIAGNOSIS — I50.9 CHRONIC HEART FAILURE, UNSPECIFIED HEART FAILURE TYPE (H): ICD-10-CM

## 2018-06-22 DIAGNOSIS — I25.10 CORONARY ARTERY DISEASE INVOLVING NATIVE CORONARY ARTERY OF NATIVE HEART WITHOUT ANGINA PECTORIS: ICD-10-CM

## 2018-06-22 DIAGNOSIS — Z09 HOSPITAL DISCHARGE FOLLOW-UP: Primary | ICD-10-CM

## 2018-06-22 LAB
ERYTHROCYTE [DISTWIDTH] IN BLOOD BY AUTOMATED COUNT: 19 % (ref 10–15)
HCT VFR BLD AUTO: 25 % (ref 35–47)
HGB BLD-MCNC: 7.8 G/DL (ref 11.7–15.7)
INR PPP: 1.4 (ref 0.86–1.14)
MCH RBC QN AUTO: 29.3 PG (ref 26.5–33)
MCHC RBC AUTO-ENTMCNC: 31.2 G/DL (ref 31.5–36.5)
MCV RBC AUTO: 94 FL (ref 78–100)
PLATELET # BLD AUTO: 284 10E9/L (ref 150–450)
PTH-INTACT SERPL-MCNC: 97 PG/ML (ref 18–80)
RBC # BLD AUTO: 2.66 10E12/L (ref 3.8–5.2)
WBC # BLD AUTO: 5.2 10E9/L (ref 4–11)

## 2018-06-22 PROCEDURE — 99496 TRANSJ CARE MGMT HIGH F2F 7D: CPT | Performed by: FAMILY MEDICINE

## 2018-06-22 PROCEDURE — 80069 RENAL FUNCTION PANEL: CPT | Performed by: FAMILY MEDICINE

## 2018-06-22 PROCEDURE — 85610 PROTHROMBIN TIME: CPT | Performed by: FAMILY MEDICINE

## 2018-06-22 PROCEDURE — 85027 COMPLETE CBC AUTOMATED: CPT | Performed by: FAMILY MEDICINE

## 2018-06-22 PROCEDURE — 83970 ASSAY OF PARATHORMONE: CPT | Performed by: FAMILY MEDICINE

## 2018-06-22 PROCEDURE — 36415 COLL VENOUS BLD VENIPUNCTURE: CPT | Performed by: FAMILY MEDICINE

## 2018-06-22 PROCEDURE — 83540 ASSAY OF IRON: CPT | Performed by: FAMILY MEDICINE

## 2018-06-22 PROCEDURE — 82728 ASSAY OF FERRITIN: CPT | Performed by: FAMILY MEDICINE

## 2018-06-22 PROCEDURE — 83550 IRON BINDING TEST: CPT | Performed by: FAMILY MEDICINE

## 2018-06-22 PROCEDURE — 82306 VITAMIN D 25 HYDROXY: CPT | Performed by: FAMILY MEDICINE

## 2018-06-22 RX ORDER — PANTOPRAZOLE SODIUM 40 MG/1
40 TABLET, DELAYED RELEASE ORAL 2 TIMES DAILY
COMMUNITY
Start: 2018-06-20 | End: 2018-07-19

## 2018-06-22 NOTE — PROGRESS NOTES
SUBJECTIVE:   Devyn Link is a 72 year old female who presents to clinic today for the following health issues:          Hospital Follow-up Visit:    Hospital/Nursing Home/IP Rehab Facility: [facility]   Date of Admission: 06/14/2018  Date of Discharge: 06/20/2018  Reason(s) for Admission: GI bleed            Problems taking medications regularly:  None       Medication changes since discharge: Protonix, stop using Aspirin 81 mg       Problems adhering to non-medication therapy:  None    Summary of hospitalization:  See outside records, reviewed and scanned  Diagnostic Tests/Treatments reviewed.  Follow up needed: INR  Other Healthcare Providers Involved in Patient s Care:         her INR clinic, Cardiology, GI  Update since discharge: improved.     Post Discharge Medication Reconciliation: discharge medications reconciled, continue medications without change.  Plan of care communicated with patient and family     Coding guidelines for this visit:  Type of Medical   Decision Making Face-to-Face Visit       within 7 Days of discharge Face-to-Face Visit        within 14 days of discharge   Moderate Complexity [id number]   High Complexity [id number]                Problem list and histories reviewed & adjusted, as indicated.  Additional history:     See under ROS     Patient Active Problem List   Diagnosis     Hyperlipidemia LDL goal <70     CAD (coronary artery disease)     Long term current use of anticoagulant     Hypertension goal BP (blood pressure) < 140/90     Advanced directives, counseling/discussion     Bradycardia     Health Care Home     Chronic atrial fibrillation (H)     Heart failure (H)     Anemia     Pacemaker     Aortic valve replaced     Mitral valve replaced     Abnormal glucose     Prediabetes     Other osteoporosis without current pathological fracture     Iron deficiency anemia, unspecified iron deficiency anemia type       Current Outpatient Prescriptions   Medication Sig  Dispense Refill     amiodarone (PACERONE/CODARONE) 200 MG tablet 200 mg       ASPIRIN NOT PRESCRIBED (INTENTIONAL) Please choose reason not prescribed, below 0 each 0     atorvastatin (LIPITOR) 20 MG tablet Take 1 tablet (20 mg) by mouth daily 90 tablet 1     calcium-vitamin D 500-125 MG-UNIT TABS        carvedilol (COREG) 3.125 MG tablet Take 1 tablet (3.125 mg) by mouth 2 times daily (with meals) 60 tablet      desonide (DESOWEN) 0.05 % cream Apply sparingly to affected area three times daily for 14 days. 15 g 0     enoxaparin (LOVENOX) 40 MG/0.4ML injection every 12 hours        famotidine (PEPCID) 20 MG tablet Take 1 tablet (20 mg) by mouth 2 times daily 60 tablet 1     Ferrous Sulfate (IRON SUPPLEMENT PO) Take 325 mg by mouth       furosemide (LASIX) 20 MG tablet Take 20 mg by mouth Takes every other day 60 tablet 1     ketoconazole (NIZORAL) 2 % cream Apply topically daily       losartan (COZAAR) 25 MG tablet Take 25 mg by mouth daily       nitroglycerin (NITROSTAT) 0.4 MG SL tablet Place 1 tablet under the tongue every 5 minutes as needed for chest pain. 25 tablet 0     pantoprazole (PROTONIX) 40 MG EC tablet Take 40 mg by mouth 2 times daily       spironolactone (ALDACTONE) 25 MG tablet Take 0.5 tablets (12.5 mg) by mouth daily 30 tablet 1     warfarin (COUMADIN) 1 MG tablet As per INR clinic at [facility] 30 tablet      [DISCONTINUED] warfarin (COUMADIN) 1 MG tablet Half tablet on Fri 30 tablet      [DISCONTINUED] warfarin (COUMADIN) 1 MG tablet Mon, Tues, Wed, Thurs, Sat, Sun 30 tablet     notes her iron is on hold for some time now to allow for some gastric healing.    Reviewed and updated as needed this visit by clinical staff       Reviewed and updated as needed this visit by Provider         ROS:  CONSTITUTIONAL:NEGATIVE for fever, chills, change in weight; very happy to be home.   ENT/MOUTH: NEGATIVE for ear, mouth and throat problems  RESP:NEGATIVE for significant cough or SOB  CV:  "NEGATIVE for chest pain, palpitations or peripheral edema  GI: see below. Did have normal colored stool today.  Is eating OK. No pain.  : no vaginal bleeding.   MUSCULOSKELETAL: occasional back pain.  PSYCHIATRIC: NEGATIVE for changes in mood or affect    Here with  and .  Complicated history.   She was being evaluated for iron deficiency anemia. She had followed bridging protocol due to being at high risk for clot due to her valvular disease/replacements.     EGD and colonoscopy on 6/13 for PERI. Gastric ulcer noted; biopsies taken. She was off coumadin and bridged with lovenox.   She developed melana once she was home and was admitted to [facility] on 6/14. She had EGD and blood vessel was clipped and injected.  She was on iv heparin and IV protonix with her usual coumadin. She did receive transfusion of one unit.  On 6/18, hemoglobin dropped again and she had another EGD with additional clips placed. She had been reversed with vitamin K prior to procedure; no active bleeding was seen.    Now on protonix.    Not sure of coumadin dose.   Was discharged Wednesday. This is Friday afternoon.  She is to see her heart doctor on July 2. Was told to get INR today.    Was taking different coumadin different days.   Did take shot.   Followed home schedule, so yesterday took 1 mg.   Has 9 shots left ; did take 2 yesterday. Had one today.    Stool today regular color; previously was real dark.    No pain in abdomen.   Will occasionally get pain back.        Will see GI in 2 months.       OBJECTIVE:     /56 (BP Location: Right arm, Cuff Size: Adult Regular)  Pulse 69  Temp 97.8  F (36.6  C) (Oral)  Resp 16  Ht 5' 5.25\" (1.657 m)  Wt 114 lb 1.6 oz (51.8 kg)  LMP  (LMP Unknown)  SpO2 96%  BMI 18.84 kg/m2  Body mass index is 18.84 kg/(m^2).  GENERAL APPEARANCE: alert and no distress  RESP: lungs clear to auscultation - no rales, rhonchi or wheezes  CV: regular rates and rhythm with crisp valve sounds " and systolic murmur.  ABDOMEN: soft, nontender, without hepatosplenomegaly or masses  MS: no edema.   PSYCH: mentation appears normal and affect normal/bright    Hemoglobin   Date Value Ref Range Status   06/22/2018 7.8 (L) 11.7 - 15.7 g/dL Final     Comment:     Critical Value called to and read back by  DR PRECIADO6/22/2018/1505 DA     06/05/2018 11.4 (L) 11.7 - 15.7 g/dL Final     Comment:     Results confirmed by repeat test     INR 1.4.    Reviewed her DC paper work.  I had received fax paper work as well.    ASSESSMENT/PLAN:     Hospital discharge follow-up      Gastrointestinal hemorrhage associated with gastric ulcer  Currently on PPI. Anticipating follow up with GI in ~ 2 months.   Appears stable at this time.     Iron deficiency anemia, unspecified iron deficiency anemia type  This had improved with iron in the past. Now anemic again, after bleed.   Anticipate she will respond to iron again; they are noting this to be on hold currently to allow for some healing. Will continue to follow hemoglobin.   - Ferritin  - CBC with platelets  - Iron and iron binding capacity    Anticoagulated on Coumadin  Currently in the process of bridging; getting back on coumadin.  They note not sure what to do today and moving forward.   INR is managed through [facility]. She has had complications recently with bleeding as above; I concur with recommendation of close follow up INR.   Did contact INR clinic at [facility] and reviewed recent course. See plan outlined in patient instructions.  and patient understand and have plan.   - INR  - ASPIRIN NOT PRESCRIBED (INTENTIONAL); Please choose reason not prescribed, below  - warfarin (COUMADIN) 1 MG tablet; As per INR clinic at [facility]    Chronic heart failure, unspecified heart failure type (H)  Stable currently.   She did have evaluation with ECHO during hospitalization.   Has follow up scheduled with Cardiology.     Coronary artery disease involving  native coronary artery of native heart without angina pectoris  As above.     Aortic valve replaced  High risk for clot; reason for anticoagulation.     Mitral valve replaced  As above.     Hypercalcemia  Releasing orders previously placed.  - Renal panel (Alb, BUN, Ca, Cl, CO2, Creat, Gluc, Phos, K, Na)  - Parathyroid Hormone Intact  - Vitamin D Deficiency    [phone] [facility].  Appreciate discussion with Estefania (her number, M-F: [phone]) regarding INR management at [facility].     Patient Instructions       Coumadin:  Take 1.5 mg today, Saturday and Sunday.    Go to INR clinic on Monday (you have an appointment at 11:30).    Continue the lovenox shots twice daily, including on Monday morning.      Thanks to Estefania!  (call her with questions M-F)        Prisca Lucero MD, MD  [facility]

## 2018-06-22 NOTE — LETTER
July 2, 2018      Devyn Link  [address] [place] [address]        Dear MsKendal Devyn Fariba,    Enclosed is a copy of your recent results.    Your iron remains low.  Continue taking it at this time.    Your calcium is still high.  The parathyroid hormone is elevated. (Vitamin D is in the low normal or sometimes felt to be insufficient).  I am going to recommend seeing an Endocrinologist regarding the parathyroid hormone and potassium. I will ask someone to call you about this.    Have a great Summer!    Sincerely,     Prisca Lucero MD

## 2018-06-22 NOTE — PATIENT INSTRUCTIONS
Coumadin:  Take 1.5 mg today, Saturday and Sunday.    Go to INR clinic on Monday (you have an appointment at 11:30).    Continue the lovenox shots twice daily, including on Monday morning.      Thanks to Estefania!  (call her with questions M-F)

## 2018-06-22 NOTE — MR AVS SNAPSHOT
After Visit Summary   6/22/2018    Devyn Link    MRN: [id number]           Patient Information     Date Of Birth          1945        Visit Information        Provider Department      6/22/2018 2:35 PM Prisca Lucero MD; [employer] [facility]        Today's Diagnoses     Anticoagulated on Coumadin    -  1    Iron deficiency anemia, unspecified iron deficiency anemia type        Hypercalcemia          Care Instructions        Coumadin:  Take 1.5 mg today, Saturday and Sunday.    Go to INR clinic on Monday (you have an appointment at 11:30).    Continue the lovenox shots twice daily, including on Monday morning.      Thanks to Estefania!  (call her with questions M-F)            Follow-ups after your visit        Follow-up notes from your care team     Return in about 4 weeks (around 7/20/2018) for Medication recheck.      Your next 10 appointments already scheduled     Jul 19, 2018 11:30 AM CDT   Office Visit with Prisca Lucero MD   [facility] ([facility])    [address] [address] [phone]           Bring a current list of meds and any records pertaining to this visit. For Physicals, please bring immunization records and any forms needing to be filled out. Please arrive 10 minutes early to complete paperwork.            Aug 13, 2018  8:50 AM CDT   Office Visit with Prisca Lucero MD   [facility] [place] ([facility])    [address] [address] [phone]           Bring a current list of meds and any records pertaining to this visit. For Physicals, please bring immunization records and any forms needing to be filled out. Please arrive 10 minutes early to complete paperwork.              Who to contact     If you have questions or need follow up information about today's clinic visit or your schedule please contact [facility] [place] directly at  "[phone].  Normal or non-critical lab and imaging results will be communicated to you by [employer], letter or phone within 4 business days after the clinic has received the results. If you do not hear from us within 7 days, please contact the clinic through [employer] or phone. If you have a critical or abnormal lab result, we will notify you by phone as soon as possible.  Submit refill requests through [employer] or call your pharmacy and they will forward the refill request to us. Please allow 3 business days for your refill to be completed.          Additional Information About Your Visit        [employer] Information     [employer] lets you send messages to your doctor, view your test results, renew your prescriptions, schedule appointments and more. To sign up, go to [email or web address] . Click on \"Log in\" on the left side of the screen, which will take you to the Welcome page. Then click on \"Sign up Now\" on the right side of the page.     You will be asked to enter the access code listed below, as well as some personal information. Please follow the directions to create your username and password.     Your access code is: [email or web address]  Expires: 2018 10:54 AM     Your access code will  in 90 days. If you need help or a new code, please call your [facility] clinic or [phone].        Care EveryWhere ID     This is your Care EveryWhere ID. This could be used by other organizations to access your [facility] medical records  [id number]        Your Vitals Were     Pulse Temperature Respirations Height Last Period Pulse Oximetry    69 97.8  F (36.6  C) (Oral) 16 5' 5.25\" (1.657 m) (LMP Unknown) 96%    BMI (Body Mass Index)                   18.84 kg/m2            Blood Pressure from Last 3 Encounters:   18 120/56   18 111/60   18 122/60    Weight from Last 3 Encounters:   18 114 lb 1.6 oz (51.8 kg)   18 114 lb (51.7 kg)   18 114 lb 6.4 oz (51.9 kg)              We Performed " the Following     CBC with platelets     Ferritin     INR     Iron and iron binding capacity     Parathyroid Hormone Intact     Renal panel (Alb, BUN, Ca, Cl, CO2, Creat, Gluc, Phos, K, Na)     Vitamin D Deficiency        Primary Care Provider Office Phone # Fax #    Prisca Lucero [phone] [address]  On license of [facility] [id number]        Equal Access to Services     [facility]: Hadii aad ku hadasho Soomaali, waaxda luqadaha, qaybta kaalmada adeegyada, waxay idiin hayaan adeeg kharash laShantelleaan ah. So [facility] [phone].    ATENCIÓN: Si habla español, tiene a muir disposición servicios gratuitos de asistencia lingüística. [facility] [phone].    We comply with applicable federal civil rights laws and [place] laws. We do not discriminate on the basis of race, color, national origin, age, disability, sex, sexual orientation, or gender identity.            Thank you!     Thank you for choosing [facility]  for your care. Our goal is always to provide you with excellent care. Hearing back from our patients is one way we can continue to improve our services. Please take a few minutes to complete the written survey that you may receive in the mail after your visit with us. Thank you!             Your Updated Medication List - Protect others around you: Learn how to safely use, store and throw away your medicines at www.disposemymeds.org.          This list is accurate as of 6/22/18  4:11 PM.  Always use your most recent med list.                   Brand Name Dispense Instructions for use Diagnosis    amiodarone 200 MG tablet    PACERONE/CODARONE     200 mg        calcium-vitamin D 500-125 MG-UNIT Tabs           carvedilol 3.125 MG tablet    COREG    60 tablet    Take 1 tablet (3.125 mg) by mouth 2 times daily (with meals)        desonide 0.05 % cream    DESOWEN    15 g    Apply sparingly to affected area three times daily for 14 days.    Localized macular rash       enoxaparin 40  MG/0.4ML injection    LOVENOX     every 12 hours        famotidine 20 MG tablet    PEPCID    60 tablet    Take 1 tablet (20 mg) by mouth 2 times daily        IRON SUPPLEMENT PO      Take 325 mg by mouth        ketoconazole 2 % cream    NIZORAL     Apply topically daily        LASIX 20 MG tablet   Generic drug:  furosemide     60 tablet    Take 20 mg by mouth Takes every other day    Heart failure (H), Hypertension goal BP (blood pressure) < 140/90       LIPITOR 20 MG tablet   Generic drug:  atorvastatin     90 tablet    Take 1 tablet (20 mg) by mouth daily        losartan 25 MG tablet    COZAAR     Take 25 mg by mouth daily        nitroGLYcerin 0.4 MG sublingual tablet    NITROSTAT    25 tablet    Place 1 tablet under the tongue every 5 minutes as needed for chest pain.    CAD (coronary artery disease)       pantoprazole 40 MG EC tablet    PROTONIX     Take 40 mg by mouth 2 times daily        spironolactone 25 MG tablet    ALDACTONE    30 tablet    Take 0.5 tablets (12.5 mg) by mouth daily        * warfarin 1 MG tablet    COUMADIN    30 tablet    Half tablet on Fri        * warfarin 1 MG tablet    COUMADIN    30 tablet    Mon, Tues, Wed, Thurs, Sat, Sun    Heart failure, unspecified heart failure chronicity, unspecified heart failure type (H)       * Notice:  This list has 2 medication(s) that are the same as other medications prescribed for you. Read the directions carefully, and ask your doctor or other care provider to review them with you.

## 2018-06-23 LAB
ALBUMIN SERPL-MCNC: 3.3 G/DL (ref 3.4–5)
ANION GAP SERPL CALCULATED.3IONS-SCNC: 9 MMOL/L (ref 3–14)
BUN SERPL-MCNC: 19 MG/DL (ref 7–30)
CALCIUM SERPL-MCNC: 10.2 MG/DL (ref 8.5–10.1)
CHLORIDE SERPL-SCNC: 110 MMOL/L (ref 94–109)
CO2 SERPL-SCNC: 25 MMOL/L (ref 20–32)
CREAT SERPL-MCNC: 0.93 MG/DL (ref 0.52–1.04)
DEPRECATED CALCIDIOL+CALCIFEROL SERPL-MC: 27 UG/L (ref 20–75)
FERRITIN SERPL-MCNC: 23 NG/ML (ref 8–252)
GFR SERPL CREATININE-BSD FRML MDRD: 59 ML/MIN/1.7M2
GLUCOSE SERPL-MCNC: 132 MG/DL (ref 70–99)
IRON SATN MFR SERPL: 9 % (ref 15–46)
IRON SERPL-MCNC: 24 UG/DL (ref 35–180)
PHOSPHATE SERPL-MCNC: 2.9 MG/DL (ref 2.5–4.5)
POTASSIUM SERPL-SCNC: 4.1 MMOL/L (ref 3.4–5.3)
SODIUM SERPL-SCNC: 144 MMOL/L (ref 133–144)
TIBC SERPL-MCNC: 257 UG/DL (ref 240–430)

## 2018-06-23 RX ORDER — WARFARIN SODIUM 1 MG/1
TABLET ORAL
Qty: 30 TABLET | COMMUNITY
Start: 2018-06-23

## 2018-07-01 ENCOUNTER — TELEPHONE (OUTPATIENT)
Dept: FAMILY MEDICINE | Facility: CLINIC | Age: 73
End: 2018-07-01

## 2018-07-01 DIAGNOSIS — E21.3 HYPERPARATHYROIDISM (H): Primary | ICD-10-CM

## 2018-07-01 DIAGNOSIS — E83.52 HYPERCALCEMIA: ICD-10-CM

## 2018-07-01 NOTE — TELEPHONE ENCOUNTER
Please call her/her .     Recommending Endocrinology due to her calcium elevation along with elevated PTH.    Please give scheduling information.

## 2018-07-02 ENCOUNTER — TRANSFERRED RECORDS (OUTPATIENT)
Dept: HEALTH INFORMATION MANAGEMENT | Facility: CLINIC | Age: 73
End: 2018-07-02

## 2018-07-02 NOTE — TELEPHONE ENCOUNTER
Called and left a message to call us back - called interpretor services [phone].  He left a detailed message advising of below including referral information.

## 2018-07-13 LAB
CREAT SERPL-MCNC: 0.76 MG/DL (ref 0.57–1.11)
GLUCOSE SERPL-MCNC: 106 MG/DL (ref 65–100)
POTASSIUM SERPL-SCNC: 4.9 MMOL/L (ref 3.5–5)

## 2018-07-19 ENCOUNTER — OFFICE VISIT (OUTPATIENT)
Dept: FAMILY MEDICINE | Facility: CLINIC | Age: 73
End: 2018-07-19
Payer: COMMERCIAL

## 2018-07-19 VITALS
HEART RATE: 62 BPM | RESPIRATION RATE: 16 BRPM | WEIGHT: 112.8 LBS | SYSTOLIC BLOOD PRESSURE: 122 MMHG | DIASTOLIC BLOOD PRESSURE: 66 MMHG | HEIGHT: 65 IN | TEMPERATURE: 98 F | BODY MASS INDEX: 18.8 KG/M2 | OXYGEN SATURATION: 99 %

## 2018-07-19 DIAGNOSIS — E21.3 HYPERPARATHYROIDISM (H): ICD-10-CM

## 2018-07-19 DIAGNOSIS — D50.9 IRON DEFICIENCY ANEMIA, UNSPECIFIED IRON DEFICIENCY ANEMIA TYPE: ICD-10-CM

## 2018-07-19 DIAGNOSIS — I50.9 CHRONIC HEART FAILURE, UNSPECIFIED HEART FAILURE TYPE (H): ICD-10-CM

## 2018-07-19 DIAGNOSIS — Z87.898 HISTORY OF ULCER DISEASE: ICD-10-CM

## 2018-07-19 DIAGNOSIS — E83.52 HYPERCALCEMIA: ICD-10-CM

## 2018-07-19 DIAGNOSIS — Z95.0 PACEMAKER: ICD-10-CM

## 2018-07-19 DIAGNOSIS — I25.10 CORONARY ARTERY DISEASE INVOLVING NATIVE CORONARY ARTERY OF NATIVE HEART WITHOUT ANGINA PECTORIS: ICD-10-CM

## 2018-07-19 DIAGNOSIS — I48.20 CHRONIC ATRIAL FIBRILLATION (H): ICD-10-CM

## 2018-07-19 DIAGNOSIS — Z79.01 LONG TERM CURRENT USE OF ANTICOAGULANT: Primary | ICD-10-CM

## 2018-07-19 LAB
ERYTHROCYTE [DISTWIDTH] IN BLOOD BY AUTOMATED COUNT: 16.6 % (ref 10–15)
HCT VFR BLD AUTO: 28.4 % (ref 35–47)
HGB BLD-MCNC: 8.5 G/DL (ref 11.7–15.7)
MCH RBC QN AUTO: 27.3 PG (ref 26.5–33)
MCHC RBC AUTO-ENTMCNC: 29.9 G/DL (ref 31.5–36.5)
MCV RBC AUTO: 91 FL (ref 78–100)
PLATELET # BLD AUTO: 246 10E9/L (ref 150–450)
RBC # BLD AUTO: 3.11 10E12/L (ref 3.8–5.2)
WBC # BLD AUTO: 4.3 10E9/L (ref 4–11)

## 2018-07-19 PROCEDURE — 85027 COMPLETE CBC AUTOMATED: CPT | Performed by: FAMILY MEDICINE

## 2018-07-19 PROCEDURE — 36415 COLL VENOUS BLD VENIPUNCTURE: CPT | Performed by: FAMILY MEDICINE

## 2018-07-19 PROCEDURE — 99214 OFFICE O/P EST MOD 30 MIN: CPT | Performed by: FAMILY MEDICINE

## 2018-07-19 RX ORDER — PANTOPRAZOLE SODIUM 40 MG/1
40 TABLET, DELAYED RELEASE ORAL 2 TIMES DAILY
Qty: 60 TABLET | Refills: 0 | Status: SHIPPED | OUTPATIENT
Start: 2018-07-19 | End: 2018-08-15

## 2018-07-19 NOTE — PATIENT INSTRUCTIONS
I am thinking it would be a good idea to be on asprin for your heart.  I do think you may want to continue on the protonix, but perhaps once daily, to protect your stomach related to that.    See if the Gastroenterologist is OK with that.

## 2018-07-19 NOTE — MR AVS SNAPSHOT
After Visit Summary   7/19/2018    Devyn Link    MRN: [id number]           Patient Information     Date Of Birth          1945        Visit Information        Provider Department      7/19/2018 11:15 AM Prisca Lucero MD; [employer] [facility]        Today's Diagnoses     Long term current use of anticoagulant    -  1    Iron deficiency anemia, unspecified iron deficiency anemia type        History of ulcer disease        Hypercalcemia        Hyperparathyroidism (H)          Care Instructions        I am thinking it would be a good idea to be on asprin for your heart.  I do think you may want to continue on the protonix, but perhaps once daily, to protect your stomach related to that.    See if the Gastroenterologist is OK with that.                 Follow-ups after your visit        Additional Services     ENDOCRINOLOGY ADULT REFERRAL       Your provider has referred you to: [facility]: [facility] [phone]   [email or web address]      Please be aware that coverage of these services is subject to the terms and limitations of your health insurance plan.  Call member services at your health plan with any benefit or coverage questions.      Please bring the following to your appointment:    >>   Any x-rays, CTs or MRIs which have been performed.  Contact the facility where they were done to arrange for  prior to your scheduled appointment.    >>   List of current medications   >>   This referral request   >>   Any documents/labs given to you for this referral                  Follow-up notes from your care team     Return in about 4 weeks (around 8/16/2018), or if symptoms worsen or fail to improve; i will send a letter regarding your hgb.      Your next 10 appointments already scheduled     Aug 13, 2018  8:50 AM CDT   Office Visit with Prisca Lucero MD   [facility] [place] ([facility])  "   [address] [address] [phone]           Bring a current list of meds and any records pertaining to this visit. For Physicals, please bring immunization records and any forms needing to be filled out. Please arrive 10 minutes early to complete paperwork.              Who to contact     If you have questions or need follow up information about today's clinic visit or your schedule please contact [facility] directly at [phone].  Normal or non-critical lab and imaging results will be communicated to you by MyChart, letter or phone within 4 business days after the clinic has received the results. If you do not hear from us within 7 days, please contact the clinic through MyChart or phone. If you have a critical or abnormal lab result, we will notify you by phone as soon as possible.  Submit refill requests through [employer] or call your pharmacy and they will forward the refill request to us. Please allow 3 business days for your refill to be completed.          Additional Information About Your Visit        [employer] Information     [employer] lets you send messages to your doctor, view your test results, renew your prescriptions, schedule appointments and more. To sign up, go to [email or web address] . Click on \"Log in\" on the left side of the screen, which will take you to the Welcome page. Then click on \"Sign up Now\" on the right side of the page.     You will be asked to enter the access code listed below, as well as some personal information. Please follow the directions to create your username and password.     Your access code is: [email or web address]  Expires: 2018 10:54 AM     Your access code will  in 90 days. If you need help or a new code, please call your [facility] or [phone].        Care EveryWhere ID     This is your Care EveryWhere ID. This could be used by other organizations to access your [facility] medical records  [id number]      " "  Your Vitals Were     Pulse Temperature Respirations Height Last Period Pulse Oximetry    62 98  F (36.7  C) (Oral) 16 5' 5.25\" (1.657 m) (LMP Unknown) 99%    BMI (Body Mass Index)                   18.63 kg/m2            Blood Pressure from Last 3 Encounters:   07/19/18 122/66   06/22/18 120/56   06/13/18 111/60    Weight from Last 3 Encounters:   07/19/18 112 lb 12.8 oz (51.2 kg)   06/22/18 114 lb 1.6 oz (51.8 kg)   06/13/18 114 lb (51.7 kg)              We Performed the Following     CBC with platelets     ENDOCRINOLOGY ADULT REFERRAL          Today's Medication Changes          These changes are accurate as of 7/19/18 12:50 PM.  If you have any questions, ask your nurse or doctor.               Stop taking these medicines if you haven't already. Please contact your care team if you have questions.     calcium-vitamin D 500-125 MG-UNIT Tabs   Stopped by:  Prisca Lucero MD                Where to get your medicines      These medications were sent to [facility] [place], [place] - [address]  [address] [facility] [address]     Phone:  [phone]     pantoprazole 40 MG EC tablet                Primary Care Provider Office Phone # Fax #    Prisca Lucero [phone] [address]  [facility] [address]        Equal Access to Services     [facility] AH: Hadii aad ku hadasho Soomaali, waaxda luqadaha, qaybta kaalmada adeegyada, jose m jackman. So [facility] [phone].    ATENCIÓN: Si habla español, tiene a muir disposición servicios gratuitos de asistencia lingüística. Sarath al [phone].    We comply with applicable federal civil rights laws and [place] laws. We do not discriminate on the basis of race, color, national origin, age, disability, sex, sexual orientation, or gender identity.            Thank you!     Thank you for choosing [facility]  for your care. Our goal is always to provide you with excellent care. " Hearing back from our patients is one way we can continue to improve our services. Please take a few minutes to complete the written survey that you may receive in the mail after your visit with us. Thank you!             Your Updated Medication List - Protect others around you: Learn how to safely use, store and throw away your medicines at www.disposemymeds.org.          This list is accurate as of 7/19/18 12:50 PM.  Always use your most recent med list.                   Brand Name Dispense Instructions for use Diagnosis    aspirin 81 MG tablet      Take 81 mg by mouth daily        ASPIRIN NOT PRESCRIBED    INTENTIONAL    0 each    Please choose reason not prescribed, below    Anticoagulated on Coumadin       carvedilol 3.125 MG tablet    COREG    60 tablet    Take 1 tablet (3.125 mg) by mouth 2 times daily (with meals)        LASIX 20 MG tablet   Generic drug:  furosemide     60 tablet    Take 20 mg by mouth Takes every other day    Heart failure (H), Hypertension goal BP (blood pressure) < 140/90       LIPITOR 20 MG tablet   Generic drug:  atorvastatin     90 tablet    Take 1 tablet (20 mg) by mouth daily        losartan 25 MG tablet    COZAAR     Take 25 mg by mouth daily        nitroGLYcerin 0.4 MG sublingual tablet    NITROSTAT    25 tablet    Place 1 tablet under the tongue every 5 minutes as needed for chest pain.    CAD (coronary artery disease)       pantoprazole 40 MG EC tablet    PROTONIX    60 tablet    Take 1 tablet (40 mg) by mouth 2 times daily    History of ulcer disease       warfarin 1 MG tablet    COUMADIN    30 tablet    As per INR clinic at [facility] [place]    Anticoagulated on Coumadin

## 2018-07-19 NOTE — PROGRESS NOTES
SUBJECTIVE:   Devyn Link is a 72 year old female who presents to clinic today for the following health issues:      Here to follow up with iron and discuss using the baby aspirin. Gastro did not want pt to take and heart doctor wants her to take daily.  Would like to discuss if needing to see a specialist for a pacemaker due to heart irregular. Would like to discuss the thyroid and why needing to see a specialist for this.        Problem list and histories reviewed & adjusted, as indicated.  Additional history:     See under ROS    Patient Active Problem List   Diagnosis     Hyperlipidemia LDL goal <70     CAD (coronary artery disease)     Long term current use of anticoagulant     Hypertension goal BP (blood pressure) < 140/90     Advanced directives, counseling/discussion     Bradycardia     Health Care Home     Chronic atrial fibrillation (H)     Heart failure (H)     Anemia     Pacemaker     Aortic valve replaced     Mitral valve replaced     Abnormal glucose     Prediabetes     Other osteoporosis without current pathological fracture     Iron deficiency anemia, unspecified iron deficiency anemia type     Hyperparathyroidism (H)       Current Outpatient Prescriptions   Medication Sig Dispense Refill     aspirin 81 MG tablet Take 81 mg by mouth daily       ASPIRIN NOT PRESCRIBED (INTENTIONAL) Please choose reason not prescribed, below 0 each 0     atorvastatin (LIPITOR) 20 MG tablet Take 1 tablet (20 mg) by mouth daily 90 tablet 1     carvedilol (COREG) 3.125 MG tablet Take 1 tablet (3.125 mg) by mouth 2 times daily (with meals) 60 tablet      furosemide (LASIX) 20 MG tablet Take 20 mg by mouth Takes every other day 60 tablet 1     losartan (COZAAR) 25 MG tablet Take 25 mg by mouth daily       nitroglycerin (NITROSTAT) 0.4 MG SL tablet Place 1 tablet under the tongue every 5 minutes as needed for chest pain. 25 tablet 0     pantoprazole (PROTONIX) 40 MG EC tablet Take 1 tablet (40 mg) by mouth 2 times daily 60  "tablet 0     warfarin (COUMADIN) 1 MG tablet As per INR clinic at [facility] [place] 30 tablet        Reviewed and updated as needed this visit by clinical staff  Tobacco  Allergies  Meds  Med Hx  Surg Hx  Fam Hx  Soc Hx      Reviewed and updated as needed this visit by Provider         ROS:   and  are with her.  CONSTITUTIONAL:NEGATIVE for fever, chills, change in weight  RESP:NEGATIVE for significant cough or SOB  CV: NEGATIVE for chest pain, palpitations or peripheral edema  GI: NEGATIVE for nausea, abdominal pain, heartburn, or change in bowel habits  PSYCHIATRIC: NEGATIVE for changes in mood or affect    Would like my opinion regarding asprin.  Notes Cardiology would like her to take, but GI doctor had stopped it.    Has a pacemaker. Her Cardiologist recommended to get a consult regarding pacemaker.   Wondering about my thoughts.  Needs a consult with the pacemaker.     Energy level is improving.    Stopped iron. Wondering if should restart.    OBJECTIVE:     /66 (BP Location: Right arm, Cuff Size: Child)  Pulse 62  Temp 98  F (36.7  C) (Oral)  Resp 16  Ht 5' 5.25\" (1.657 m)  Wt 112 lb 12.8 oz (51.2 kg)  LMP  (LMP Unknown)  SpO2 99%  BMI 18.63 kg/m2  Body mass index is 18.63 kg/(m^2).  GENERAL APPEARANCE: alert and no distress  RESP: lungs clear to auscultation - no rales, rhonchi or wheezes  CV: regular rates and rhythm  MS: no edema.  PSYCH: mentation appears normal and affect normal/bright    Hemoglobin   Date Value Ref Range Status   06/22/2018 7.8 (L) 11.7 - 15.7 g/dL Final     Comment:     Critical Value called to and read back by  DR PRECIADO6/22/2018/1505 DA     06/05/2018 11.4 (L) 11.7 - 15.7 g/dL Final     Comment:     Results confirmed by repeat test     Component      Latest Ref Rng & Units 6/22/2018   Sodium      133 - 144 mmol/L 144   Potassium      3.4 - 5.3 mmol/L 4.1   Chloride      94 - 109 mmol/L 110 (H)   Carbon Dioxide      20 - 32 mmol/L 25   Anion " Gap      3 - 14 mmol/L 9   Glucose      70 - 99 mg/dL 132 (H)   Urea Nitrogen      7 - 30 mg/dL 19   Creatinine      0.52 - 1.04 mg/dL 0.93   GFR Estimate      >60 mL/min/1.7m2 59 (L)   GFR Estimate If Black      >60 mL/min/1.7m2 71   Calcium      8.5 - 10.1 mg/dL 10.2 (H)   Phosphorus      2.5 - 4.5 mg/dL 2.9   Albumin      3.4 - 5.0 g/dL 3.3 (L)   Parathyroid Hormone Intact      18 - 80 pg/mL 97 (H)   Vitamin D Deficiency screening      20 - 75 ug/L 27       Lab Results   Component Value Date    A1C 6.3 04/02/2018    A1C 6.2 09/01/2017    A1C 6.2 08/06/2015           ASSESSMENT/PLAN:     Iron deficiency anemia, unspecified iron deficiency anemia type  Will check hemoglobin to see if improving. Will then send letter regarding iron and future follow up.   - CBC with platelets  - **CBC with platelets FUTURE anytime; Future    Coronary artery disease involving native coronary artery of native heart without angina pectoris  I do think it would be a good idea to be on asprin.  Often PPI can help protect the stomach.  Currently taking PPI bid; recommend they talk with GI to see if they can reduce to once daily.    History of ulcer disease  Needing refill prior to seeing GI  - pantoprazole (PROTONIX) 40 MG EC tablet; Take 1 tablet (40 mg) by mouth 2 times daily    Pacemaker  Recommend to follow Cardiology recommendations about this.     Hyperparathyroidism (H)  With an elevated calcium. Reviewed vitamin D. I do recommend seeing Endocrinology; discussed rationale.   - ENDOCRINOLOGY ADULT REFERRAL    Hypercalcemia  As above.   - ENDOCRINOLOGY ADULT REFERRAL    Chronic atrial fibrillation (H)  Sees Cardiology. On anticoagulation for this and valvular disease.       Long term current use of anticoagulant  As above.     Chronic heart failure, unspecified heart failure type (H)  Managed through her Cardiologist.         Patient Instructions       I am thinking it would be a good idea to be on asprin for your heart.  I do think  you may want to continue on the protonix, but perhaps once daily, to protect your stomach related to that.    See if the Gastroenterologist is OK with that.             Prisca Lucero MD, MD  [facility]

## 2018-07-19 NOTE — LETTER
[facility]  [address] [address] [phone]          July 23, 2018    Devyn Link                                                                                                                     [address]  [facility] [address]          Charli Shepard!    Enclosed is a copy of your recent results.    Your hemoglobin is still low, but has improved.   It might be a good idea to take the iron every other day for a month.    I would like to see you and recheck in about 2 months.     I hope you are having a nice summer!    Sincerely,         Prisca [employer] MD

## 2018-07-30 LAB
CREAT SERPL-MCNC: 0.83 MG/DL (ref 0.57–1.11)
GLUCOSE SERPL-MCNC: 118 MG/DL (ref 65–100)
POTASSIUM SERPL-SCNC: 4 MMOL/L (ref 3.5–5)

## 2018-08-08 ENCOUNTER — TRANSFERRED RECORDS (OUTPATIENT)
Dept: HEALTH INFORMATION MANAGEMENT | Facility: CLINIC | Age: 73
End: 2018-08-08

## 2018-08-14 ENCOUNTER — TELEPHONE (OUTPATIENT)
Dept: FAMILY MEDICINE | Facility: CLINIC | Age: 73
End: 2018-08-14

## 2018-08-14 DIAGNOSIS — I50.9 CHRONIC HEART FAILURE, UNSPECIFIED HEART FAILURE TYPE (H): Primary | ICD-10-CM

## 2018-08-14 NOTE — TELEPHONE ENCOUNTER
Routing to Dr. Lucero: Patient's  calling to request refill of pantoprazole. They do have a visit with their GI doc on 9/24/18. Patient is currently still taking 40 mg BID.     Also, last week visited cardiologist for pacemaker. Physician sent wife home with Lasix every day. Wants to to check up with labs and appointment one month from now.     Notes per Cardiology 8/8/18: MEDICATION CHANGES:  -CHANGE: Lasix (Furosemide) 20mg by mouth to DAILY.    FUTURE DIAGNOSTIC TESTS/PROCEDURES:  -Please have a labs check (basic metabolic panel-BMP) at your primary care physicians office in 1 month with the increase in your Lasix.     Next 5 appointments (look out 90 days)     Sep 10, 2018 10:10 AM CDT   Office Visit with Prisca Lucero MD   [facility] ([facility]    [address] [address] [phone]

## 2018-08-15 DIAGNOSIS — Z87.898 HISTORY OF ULCER DISEASE: ICD-10-CM

## 2018-08-15 NOTE — TELEPHONE ENCOUNTER
Requested Prescriptions   Pending Prescriptions Disp Refills     pantoprazole (PROTONIX) 40 MG EC tablet 60 tablet 0     Sig: Take 1 tablet (40 mg) by mouth 2 times daily    There is no refill protocol information for this order

## 2018-08-15 NOTE — TELEPHONE ENCOUNTER
"Requested Prescriptions   Pending Prescriptions Disp Refills     pantoprazole (PROTONIX) 40 MG EC tablet 60 tablet 0    Last Written Prescription Date:  7/19/2018  Last Fill Quantity: 60,  # refills: 0   Last office visit: 7/19/2018 with prescribing provider:  Jazmine   Future Office Visit:   Next 5 appointments (look out 90 days)     Sep 10, 2018 10:10 AM CDT   Office Visit with Prisca Lucero MD   [address] [address] [phone]                  Sig: Take 1 tablet (40 mg) by mouth 2 times daily    PPI Protocol Failed    8/15/2018 11:50 AM       Failed - No diagnosis of osteoporosis on record       Passed - Not on Clopidogrel (unless Pantoprazole ordered)       Passed - Recent (12 mo) or future (30 days) visit within the authorizing provider's specialty    Patient had office visit in the last 12 months or has a visit in the next 30 days with authorizing provider or within the authorizing provider's specialty.  See \"Patient Info\" tab in inbasket, or \"Choose Columns\" in Meds & Orders section of the refill encounter.           Passed - Patient is age 18 or older       Passed - No active pregnacy on record       Passed - No positive pregnancy test in past 12 months          Routing refill request to provider for review/approval because:  DX of Osteoporosis  Alyson Kunz RN - Triage  [facility]          "

## 2018-08-16 RX ORDER — PANTOPRAZOLE SODIUM 40 MG/1
40 TABLET, DELAYED RELEASE ORAL 2 TIMES DAILY
Qty: 60 TABLET | Refills: 0 | Status: SHIPPED | OUTPATIENT
Start: 2018-08-16 | End: 2018-08-17

## 2018-08-16 NOTE — TELEPHONE ENCOUNTER
Please call... I think she was going to be following up with GI specialist during this time.    See if she has.    I am wondering if she can go to once daily, or what they have said .

## 2018-08-16 NOTE — TELEPHONE ENCOUNTER
Left message for pt to give a call back regarding the protonix prescription.    When call back need to get a local pharmacy due to prescription only being a 30 day supply.  [facility].  Dianne Turcios, CMA

## 2018-08-16 NOTE — TELEPHONE ENCOUNTER
Routing to Station A to call patient to set up lab only appointment before office visit on  Next 5 appointments (look out 90 days)     Sep 10, 2018 10:10 AM CDT   Office Visit with Prisca Lucero MD   [facility]    [address] [place] [address] [phone]              So she can have results at time of visit.    Alyson CONROY, Triage RN

## 2018-08-16 NOTE — TELEPHONE ENCOUNTER
Per telephone encounter 8/14/18:  Patient's  calling to request refill of pantoprazole. They do have a visit with their GI doc on 9/24/18. Patient is currently still taking 40 mg BID.      Also, last week visited cardiologist for pacemaker. Physician sent wife home with Lasix every day. Wants to to check up with labs and appointment one month from now.      Notes per Cardiology 8/8/18: MEDICATION CHANGES:  -CHANGE: Lasix (Furosemide) 20mg by mouth to DAILY.    FUTURE DIAGNOSTIC TESTS/PROCEDURES:  -Please have a labs check (basic metabolic panel-BMP) at your primary care physicians office in 1 month with the increase in your Lasix.           Next 5 appointments (look out 90 days)      Sep 10, 2018 10:10 AM CDT   Office Visit with Prisca Lucero MD   [facility]     [address] [address]   [phone]         Alyson CONROY, Triage RN

## 2018-08-17 DIAGNOSIS — I50.9 CHRONIC HEART FAILURE, UNSPECIFIED HEART FAILURE TYPE (H): ICD-10-CM

## 2018-08-17 DIAGNOSIS — D50.9 IRON DEFICIENCY ANEMIA, UNSPECIFIED IRON DEFICIENCY ANEMIA TYPE: ICD-10-CM

## 2018-08-17 LAB
ERYTHROCYTE [DISTWIDTH] IN BLOOD BY AUTOMATED COUNT: 18.5 % (ref 10–15)
HCT VFR BLD AUTO: 30.2 % (ref 35–47)
HGB BLD-MCNC: 8.9 G/DL (ref 11.7–15.7)
MCH RBC QN AUTO: 26.4 PG (ref 26.5–33)
MCHC RBC AUTO-ENTMCNC: 29.5 G/DL (ref 31.5–36.5)
MCV RBC AUTO: 90 FL (ref 78–100)
PLATELET # BLD AUTO: 221 10E9/L (ref 150–450)
RBC # BLD AUTO: 3.37 10E12/L (ref 3.8–5.2)
WBC # BLD AUTO: 4.6 10E9/L (ref 4–11)

## 2018-08-17 PROCEDURE — 36415 COLL VENOUS BLD VENIPUNCTURE: CPT | Performed by: FAMILY MEDICINE

## 2018-08-17 PROCEDURE — 80048 BASIC METABOLIC PNL TOTAL CA: CPT | Performed by: FAMILY MEDICINE

## 2018-08-17 PROCEDURE — 85027 COMPLETE CBC AUTOMATED: CPT | Performed by: FAMILY MEDICINE

## 2018-08-17 RX ORDER — PANTOPRAZOLE SODIUM 40 MG/1
40 TABLET, DELAYED RELEASE ORAL 2 TIMES DAILY
Qty: 60 TABLET | Refills: 0 | Status: SHIPPED | OUTPATIENT
Start: 2018-08-17 | End: 2018-09-10

## 2018-08-18 LAB
ANION GAP SERPL CALCULATED.3IONS-SCNC: 7 MMOL/L (ref 3–14)
BUN SERPL-MCNC: 20 MG/DL (ref 7–30)
CALCIUM SERPL-MCNC: 9.8 MG/DL (ref 8.5–10.1)
CHLORIDE SERPL-SCNC: 110 MMOL/L (ref 94–109)
CO2 SERPL-SCNC: 27 MMOL/L (ref 20–32)
CREAT SERPL-MCNC: 0.66 MG/DL (ref 0.52–1.04)
GFR SERPL CREATININE-BSD FRML MDRD: 87 ML/MIN/1.7M2
GLUCOSE SERPL-MCNC: 132 MG/DL (ref 70–99)
POTASSIUM SERPL-SCNC: 3.7 MMOL/L (ref 3.4–5.3)
SODIUM SERPL-SCNC: 144 MMOL/L (ref 133–144)

## 2018-08-20 DIAGNOSIS — Z87.898 HISTORY OF ULCER DISEASE: ICD-10-CM

## 2018-08-21 RX ORDER — PANTOPRAZOLE SODIUM 40 MG/1
40 TABLET, DELAYED RELEASE ORAL 2 TIMES DAILY
Qty: 60 TABLET | Refills: 0 | Status: CANCELLED | OUTPATIENT
Start: 2018-08-21

## 2018-08-21 NOTE — TELEPHONE ENCOUNTER
"Requested Prescriptions   Pending Prescriptions Disp Refills     pantoprazole (PROTONIX) 40 MG EC tablet 60 tablet 0    Last Written Prescription Date:  8/17/2018  Last Fill Quantity: 60,  # refills: 0   Last office visit: 7/19/2018 with prescribing provider:  Jazmine   Future Office Visit:   Next 5 appointments (look out 90 days)     Sep 10, 2018 10:10 AM CDT   Office Visit with Prisca Lucero MD   [address] [address] [phone]                  Sig: Take 1 tablet (40 mg) by mouth 2 times daily    PPI Protocol Failed    8/21/2018  7:55 AM       Failed - No diagnosis of osteoporosis on record       Passed - Not on Clopidogrel (unless Pantoprazole ordered)       Passed - Recent (12 mo) or future (30 days) visit within the authorizing provider's specialty    Patient had office visit in the last 12 months or has a visit in the next 30 days with authorizing provider or within the authorizing provider's specialty.  See \"Patient Info\" tab in inbasket, or \"Choose Columns\" in Meds & Orders section of the refill encounter.           Passed - Patient is age 18 or older       Passed - No active pregnacy on record       Passed - No positive pregnancy test in past 12 months        Filled 8/17/2018. Refill too soon  Alyson Kunz RN - Triage  [facility]    "

## 2018-08-21 NOTE — TELEPHONE ENCOUNTER
Mail order pharmacy requesting ([facility])       Disp Refills Start End JONATHAN   pantoprazole (PROTONIX) 40 MG EC tablet 60 tablet 0 8/17/2018  No   Sig - Route: Take 1 tablet (40 mg) by mouth 2 times daily - Oral

## 2018-08-27 RX ORDER — PANTOPRAZOLE SODIUM 40 MG/1
40 TABLET, DELAYED RELEASE ORAL 2 TIMES DAILY
Qty: 60 TABLET | Refills: 0 | OUTPATIENT
Start: 2018-08-27

## 2018-09-10 ENCOUNTER — OFFICE VISIT (OUTPATIENT)
Dept: FAMILY MEDICINE | Facility: CLINIC | Age: 73
End: 2018-09-10
Payer: COMMERCIAL

## 2018-09-10 VITALS
BODY MASS INDEX: 18.1 KG/M2 | TEMPERATURE: 97.6 F | SYSTOLIC BLOOD PRESSURE: 120 MMHG | RESPIRATION RATE: 16 BRPM | WEIGHT: 109.6 LBS | HEART RATE: 61 BPM | OXYGEN SATURATION: 100 % | DIASTOLIC BLOOD PRESSURE: 62 MMHG

## 2018-09-10 DIAGNOSIS — Z23 NEED FOR PROPHYLACTIC VACCINATION AND INOCULATION AGAINST INFLUENZA: ICD-10-CM

## 2018-09-10 DIAGNOSIS — Z87.898 HISTORY OF ULCER DISEASE: Primary | ICD-10-CM

## 2018-09-10 DIAGNOSIS — D64.9 ANEMIA, UNSPECIFIED TYPE: ICD-10-CM

## 2018-09-10 DIAGNOSIS — I10 HYPERTENSION GOAL BP (BLOOD PRESSURE) < 140/90: ICD-10-CM

## 2018-09-10 DIAGNOSIS — D50.9 IRON DEFICIENCY ANEMIA, UNSPECIFIED IRON DEFICIENCY ANEMIA TYPE: ICD-10-CM

## 2018-09-10 DIAGNOSIS — I50.9 CHRONIC HEART FAILURE, UNSPECIFIED HEART FAILURE TYPE (H): ICD-10-CM

## 2018-09-10 DIAGNOSIS — E21.3 HYPERPARATHYROIDISM (H): ICD-10-CM

## 2018-09-10 DIAGNOSIS — Z95.0 PACEMAKER: ICD-10-CM

## 2018-09-10 DIAGNOSIS — I48.20 CHRONIC ATRIAL FIBRILLATION (H): ICD-10-CM

## 2018-09-10 LAB
ERYTHROCYTE [DISTWIDTH] IN BLOOD BY AUTOMATED COUNT: 18.1 % (ref 10–15)
HCT VFR BLD AUTO: 37.4 % (ref 35–47)
HGB BLD-MCNC: 11.2 G/DL (ref 11.7–15.7)
MCH RBC QN AUTO: 26.8 PG (ref 26.5–33)
MCHC RBC AUTO-ENTMCNC: 29.9 G/DL (ref 31.5–36.5)
MCV RBC AUTO: 90 FL (ref 78–100)
PLATELET # BLD AUTO: 218 10E9/L (ref 150–450)
RBC # BLD AUTO: 4.18 10E12/L (ref 3.8–5.2)
WBC # BLD AUTO: 4.3 10E9/L (ref 4–11)

## 2018-09-10 PROCEDURE — 80048 BASIC METABOLIC PNL TOTAL CA: CPT | Performed by: FAMILY MEDICINE

## 2018-09-10 PROCEDURE — 36415 COLL VENOUS BLD VENIPUNCTURE: CPT | Performed by: FAMILY MEDICINE

## 2018-09-10 PROCEDURE — G0008 ADMIN INFLUENZA VIRUS VAC: HCPCS | Performed by: FAMILY MEDICINE

## 2018-09-10 PROCEDURE — 99214 OFFICE O/P EST MOD 30 MIN: CPT | Mod: 25 | Performed by: FAMILY MEDICINE

## 2018-09-10 PROCEDURE — 90662 IIV NO PRSV INCREASED AG IM: CPT | Performed by: FAMILY MEDICINE

## 2018-09-10 PROCEDURE — 85027 COMPLETE CBC AUTOMATED: CPT | Performed by: FAMILY MEDICINE

## 2018-09-10 RX ORDER — PANTOPRAZOLE SODIUM 40 MG/1
40 TABLET, DELAYED RELEASE ORAL DAILY
Qty: 90 TABLET | Refills: 1 | Status: SHIPPED | OUTPATIENT
Start: 2018-09-10 | End: 2019-03-05

## 2018-09-10 NOTE — PROGRESS NOTES
SUBJECTIVE:   Devyn Link is a 72 year old female who presents to clinic today for the following health issues:      Medication Followup of Pantoprazole    Taking Medication as prescribed: yes    Side Effects:  None    Medication Helping Symptoms:  Yes    Patient needs a refill on medication  Patient also saw a cardiologist last month for her pacemaker and they adjusted the speed and would like you to check to make sure that it's still okay.           Problem list and histories reviewed & adjusted, as indicated.  Additional history:     See under ROS    Patient Active Problem List   Diagnosis     Hyperlipidemia LDL goal <70     CAD (coronary artery disease)     Long term current use of anticoagulant     Hypertension goal BP (blood pressure) < 140/90     Advanced directives, counseling/discussion     Bradycardia     Health Care Home     Chronic atrial fibrillation (H)     Heart failure (H)     Anemia     Pacemaker     Aortic valve replaced     Mitral valve replaced     Abnormal glucose     Prediabetes     Other osteoporosis without current pathological fracture     Iron deficiency anemia, unspecified iron deficiency anemia type     Hyperparathyroidism (H)       Current Outpatient Prescriptions   Medication Sig Dispense Refill     aspirin 81 MG tablet Take 81 mg by mouth daily       ASPIRIN NOT PRESCRIBED (INTENTIONAL) Please choose reason not prescribed, below 0 each 0     atorvastatin (LIPITOR) 20 MG tablet Take 1 tablet (20 mg) by mouth daily 90 tablet 1     carvedilol (COREG) 3.125 MG tablet Take 1 tablet (3.125 mg) by mouth 2 times daily (with meals) 60 tablet      Ferrous Sulfate (IRON SUPPLEMENT PO) Take 325 mg by mouth Every other day       furosemide (LASIX) 20 MG tablet Take 20 mg by mouth daily  60 tablet 1     losartan (COZAAR) 25 MG tablet Take 25 mg by mouth daily       nitroglycerin (NITROSTAT) 0.4 MG SL tablet Place 1 tablet under the tongue every 5 minutes as needed for chest pain. 25 tablet 0      pantoprazole (PROTONIX) 40 MG EC tablet Take 1 tablet (40 mg) by mouth 2 times daily 60 tablet 0     warfarin (COUMADIN) 1 MG tablet As per INR clinic at [facility] 30 tablet          Reviewed and updated as needed this visit by clinical staff       Reviewed and updated as needed this visit by Provider         ROS:  CONSTITUTIONAL:NEGATIVE for fever, chills, change in weight  RESP: see below  CV: NEGATIVE for chest pain, palpitations or peripheral edema  GI: NEGATIVE for nausea, abdominal pain, heartburn, or change in bowel habits  PSYCHIATRIC: NEGATIVE for changes in mood or affect    GI doctor said they don't need to go there any more. Here to follow up on PPI.    Lasix increased to daily due to shortness of breath. She does feel better.   Pacemaker speed was also adjusted.     Taking iron every other day currently.    OBJECTIVE:     /62 (BP Location: Right arm, Patient Position: Chair, Cuff Size: Adult Regular)  Pulse 61  Temp 97.6  F (36.4  C) (Oral)  Resp 16  Wt 109 lb 9.6 oz (49.7 kg)  LMP  (LMP Unknown)  SpO2 100%  BMI 18.1 kg/m2  Body mass index is 18.1 kg/(m^2).  GENERAL APPEARANCE: alert and no distress  RESP: lungs clear to auscultation - no rales, rhonchi or wheezes  CV: regular rates and rhythm  MS: no edema.  PSYCH: mentation appears normal and affect normal/bright      Hemoglobin   Date Value Ref Range Status   08/17/2018 8.9 (L) 11.7 - 15.7 g/dL Final     Comment:     Results confirmed by repeat test   07/19/2018 8.5 (L) 11.7 - 15.7 g/dL Final     Comment:     Results confirmed by repeat test       EGD:  Recommendation:           - Await pathology results. Erosions and one small                             gastric ulcer likely from use of daily ASA. Patient                             needs ASA for her heart and should continue with                             that. She should avoid all other NSAIDs. She should                             change her famotidine to omeprazole for  greater                             protection and healing of the stomach. As long as                             she is on ASA she should stay on PPI indefinitely.                             I have given her a one month supply, and refills                             can come through her PCP.     Saw Pacemaker doctor for speed adjustment.     Reviewed Cardiology note; would like us to do bmp due to increase lasix.   Was changed to daily instead of every other day.          ASSESSMENT/PLAN:     History of ulcer disease  Has been on bid PPI for several weeks now.  Will have her decrease to one daily. Discussed best time to be taking this.  - pantoprazole (PROTONIX) 40 MG EC tablet; Take 1 tablet (40 mg) by mouth daily    Chronic heart failure, unspecified heart failure type (H)  Increase of lasix to daily; getting bmp    Hypertension goal BP (blood pressure) < 140/90  Controlled.   - Basic metabolic panel    Chronic atrial fibrillation (H)  On anticoagulation; monitored through Cardiology.  - Basic metabolic panel    Iron deficiency anemia, unspecified iron deficiency anemia type  last couple values were similar.   Had improved previously on iron and then had a bleed.  Checking today; Anticipate Hematologist if hemoglobin not improving. (?iron infusion).  - CBC with platelets    Pacemaker  Recent adjustment.    Hyperparathyroidism (H)  She is scheduled to see Endocrinology    Anemia, unspecified type    - CBC with platelets    Need for prophylactic vaccination and inoculation against influenza    - FLU VACCINE, INCREASED ANTIGEN, PRESV FREE, AGE 65+ [41407]  - Vaccine Administration, Initial [18349]  - ADMIN INFLUENZA (For MEDICARE Patients ONLY) []            Prisca Lucero MD, MD  [facility] [place]    Injectable Influenza Immunization Documentation    1.  Is the person to be vaccinated sick today?   No    2. Does the person to be vaccinated have an allergy to a component   of the vaccine?   No  Egg  Allergy Algorithm Link    3. Has the person to be vaccinated ever had a serious reaction   to influenza vaccine in the past?   No    4. Has the person to be vaccinated ever had Guillain-Barré syndrome?   No    Form completed by SMA Honorio

## 2018-09-10 NOTE — MR AVS SNAPSHOT
After Visit Summary   9/10/2018    Devyn Link    MRN: [id number]           Patient Information     Date Of Birth          1945        Visit Information        Provider Department      9/10/2018 10:00 AM Prisca Lucero MD; MULTILINGUAL WORD [facility]        Today's Diagnoses     Need for prophylactic vaccination and inoculation against influenza    -  1    History of ulcer disease        Hypertension goal BP (blood pressure) < 140/90        Chronic atrial fibrillation (H)        Anemia, unspecified type        Iron deficiency anemia, unspecified iron deficiency anemia type        Hyperparathyroidism (H)          Care Instructions        I will let you know about your hemoglobin. If it has not improved, I would recommend seeing Hematology.    Decrease the pantoprazole to once daily. This is best taken first thing in the morning, half an hour before your first meal of the day.          Follow-ups after your visit        Follow-up notes from your care team     Return in about 6 weeks (around 10/25/2018) for has appointment..      Your next 10 appointments already scheduled     Sep 26, 2018  2:30 PM CDT   New Visit with Keisha Castro MD   [facility]    [address]  [facility] [address] [phone]            Oct 25, 2018 10:50 AM CDT   PHYSICAL with Prisca Lucero MD   [facility]    [address] [address] [phone]              Who to contact     If you have questions or need follow up information about today's clinic visit or your schedule please contact [facility] directly at [phone].  Normal or non-critical lab and imaging results will be communicated to you by MyChart, letter or phone within 4 business days after the clinic has received the results. If you do not hear from us within 7 days, please contact the  "clinic through [employer] or phone. If you have a critical or abnormal lab result, we will notify you by phone as soon as possible.  Submit refill requests through Lela or call your pharmacy and they will forward the refill request to us. Please allow 3 business days for your refill to be completed.          Additional Information About Your Visit        [employer] Information     Lela lets you send messages to your doctor, view your test results, renew your prescriptions, schedule appointments and more. To sign up, go to [email or web address] . Click on \"Log in\" on the left side of the screen, which will take you to the Welcome page. Then click on \"Sign up Now\" on the right side of the page.     You will be asked to enter the access code listed below, as well as some personal information. Please follow the directions to create your username and password.     Your access code is: [email or web address]  Expires: 2018  9:51 AM     Your access code will  in 90 days. If you need help or a new code, please call your [place] clinic or [phone].        Care EveryWhere ID     This is your Care EveryWhere ID. This could be used by other organizations to access your [place] medical records  [id number]        Your Vitals Were     Pulse Temperature Respirations Last Period Pulse Oximetry BMI (Body Mass Index)    61 97.6  F (36.4  C) (Oral) 16 (LMP Unknown) 100% 18.1 kg/m2       Blood Pressure from Last 3 Encounters:   09/10/18 120/62   18 122/66   18 120/56    Weight from Last 3 Encounters:   09/10/18 109 lb 9.6 oz (49.7 kg)   18 112 lb 12.8 oz (51.2 kg)   18 114 lb 1.6 oz (51.8 kg)              We Performed the Following     ADMIN INFLUENZA (For MEDICARE Patients ONLY) []     Basic metabolic panel     CBC with platelets     FLU VACCINE, INCREASED ANTIGEN, PRESV FREE, AGE 65+ [43368]     Vaccine Administration, Initial [42988]          Today's Medication Changes          These changes " are accurate as of 9/10/18 11:17 AM.  If you have any questions, ask your nurse or doctor.               These medicines have changed or have updated prescriptions.        Dose/Directions    pantoprazole 40 MG EC tablet   Commonly known as:  PROTONIX   This may have changed:  when to take this   Used for:  History of ulcer disease   Changed by:  Prisca Lucero MD        Dose:  40 mg   Take 1 tablet (40 mg) by mouth daily   Quantity:  90 tablet   Refills:  1            Where to get your medicines      These medications were sent to [facility] - [place], [place] - [address]  [address] [facility] [address]     Phone:  [phone]     pantoprazole 40 MG EC tablet                Primary Care Provider Office Phone # Fax #    Prisca Lucero [phone] [address] [address] [address]        Equal Access to Services     [facility]: Hadii aad ku hadasho Soomaali, waaxda luqadaha, qaybta kaalmada adeegyada, jose m almeidain hayaafarooq culp . So [facility] [phone].    ATENCIÓN: Si habla español, tiene a miur disposición servicios gratuitos de asistencia lingüística. [facility] [phone].    We comply with applicable federal civil rights laws and [place] laws. We do not discriminate on the basis of race, color, national origin, age, disability, sex, sexual orientation, or gender identity.            Thank you!     Thank you for choosing [facility]  for your care. Our goal is always to provide you with excellent care. Hearing back from our patients is one way we can continue to improve our services. Please take a few minutes to complete the written survey that you may receive in the mail after your visit with us. Thank you!             Your Updated Medication List - Protect others around you: Learn how to safely use, store and throw away your medicines at www.disposemymeds.org.          This list is accurate as of 9/10/18 11:17 AM.   Always use your most recent med list.                   Brand Name Dispense Instructions for use Diagnosis    aspirin 81 MG tablet      Take 81 mg by mouth daily        ASPIRIN NOT PRESCRIBED    INTENTIONAL    0 each    Please choose reason not prescribed, below    Anticoagulated on Coumadin       carvedilol 3.125 MG tablet    COREG    60 tablet    Take 1 tablet (3.125 mg) by mouth 2 times daily (with meals)        IRON SUPPLEMENT PO      Take 325 mg by mouth Every other day        LASIX 20 MG tablet   Generic drug:  furosemide     60 tablet    Take 20 mg by mouth daily    Heart failure (H), Hypertension goal BP (blood pressure) < 140/90       LIPITOR 20 MG tablet   Generic drug:  atorvastatin     90 tablet    Take 1 tablet (20 mg) by mouth daily        losartan 25 MG tablet    COZAAR     Take 25 mg by mouth daily        nitroGLYcerin 0.4 MG sublingual tablet    NITROSTAT    25 tablet    Place 1 tablet under the tongue every 5 minutes as needed for chest pain.    CAD (coronary artery disease)       pantoprazole 40 MG EC tablet    PROTONIX    90 tablet    Take 1 tablet (40 mg) by mouth daily    History of ulcer disease       warfarin 1 MG tablet    COUMADIN    30 tablet    As per INR clinic at [facility]    Anticoagulated on Coumadin

## 2018-09-10 NOTE — PATIENT INSTRUCTIONS
I will let you know about your hemoglobin. If it has not improved, I would recommend seeing Hematology.    Decrease the pantoprazole to once daily. This is best taken first thing in the morning, half an hour before your first meal of the day.

## 2018-09-10 NOTE — LETTER
September 11, 2018      Devyn Link  [address] [address]        Dear Ms. Devyn Link,    Enclosed is a copy of your recent results.    Your hemoglobin has much improved. At this time, I do think we can continue to follow.    The elevated calcium may be related to your parathyroid hormone. This is the reason you are seeing the Endocrine specialist.    I am glad you are feeling better!    Sincerely,     Prisca Lucero MD

## 2018-09-11 LAB
ANION GAP SERPL CALCULATED.3IONS-SCNC: 6 MMOL/L (ref 3–14)
BUN SERPL-MCNC: 17 MG/DL (ref 7–30)
CALCIUM SERPL-MCNC: 11 MG/DL (ref 8.5–10.1)
CHLORIDE SERPL-SCNC: 109 MMOL/L (ref 94–109)
CO2 SERPL-SCNC: 29 MMOL/L (ref 20–32)
CREAT SERPL-MCNC: 0.66 MG/DL (ref 0.52–1.04)
CREAT SERPL-MCNC: 0.76 MG/DL (ref 0.57–1.11)
GFR SERPL CREATININE-BSD FRML MDRD: 88 ML/MIN/1.7M2
GLUCOSE SERPL-MCNC: 121 MG/DL (ref 65–100)
GLUCOSE SERPL-MCNC: 98 MG/DL (ref 70–99)
POTASSIUM SERPL-SCNC: 3.8 MMOL/L (ref 3.4–5.3)
POTASSIUM SERPL-SCNC: 4.2 MMOL/L (ref 3.5–5)
SODIUM SERPL-SCNC: 144 MMOL/L (ref 133–144)

## 2018-09-21 ENCOUNTER — TRANSFERRED RECORDS (OUTPATIENT)
Dept: HEALTH INFORMATION MANAGEMENT | Facility: CLINIC | Age: 73
End: 2018-09-21

## 2018-09-26 ENCOUNTER — OFFICE VISIT (OUTPATIENT)
Dept: ENDOCRINOLOGY | Facility: CLINIC | Age: 73
End: 2018-09-26
Payer: COMMERCIAL

## 2018-09-26 VITALS
BODY MASS INDEX: 18.76 KG/M2 | DIASTOLIC BLOOD PRESSURE: 72 MMHG | OXYGEN SATURATION: 99 % | WEIGHT: 112.6 LBS | SYSTOLIC BLOOD PRESSURE: 150 MMHG | HEIGHT: 65 IN | HEART RATE: 77 BPM | TEMPERATURE: 97.4 F

## 2018-09-26 DIAGNOSIS — E21.3 HYPERPARATHYROIDISM (H): Primary | ICD-10-CM

## 2018-09-26 DIAGNOSIS — M81.8 OTHER OSTEOPOROSIS WITHOUT CURRENT PATHOLOGICAL FRACTURE: ICD-10-CM

## 2018-09-26 DIAGNOSIS — E83.52 HYPERCALCEMIA: ICD-10-CM

## 2018-09-26 LAB
CA-I SERPL ISE-MCNC: 5.5 MG/DL (ref 4.4–5.2)
PTH-INTACT SERPL-MCNC: 147 PG/ML (ref 18–80)

## 2018-09-26 PROCEDURE — 83970 ASSAY OF PARATHORMONE: CPT | Performed by: INTERNAL MEDICINE

## 2018-09-26 PROCEDURE — 84100 ASSAY OF PHOSPHORUS: CPT | Performed by: INTERNAL MEDICINE

## 2018-09-26 PROCEDURE — 99204 OFFICE O/P NEW MOD 45 MIN: CPT | Performed by: INTERNAL MEDICINE

## 2018-09-26 PROCEDURE — 83735 ASSAY OF MAGNESIUM: CPT | Performed by: INTERNAL MEDICINE

## 2018-09-26 PROCEDURE — 36415 COLL VENOUS BLD VENIPUNCTURE: CPT | Performed by: INTERNAL MEDICINE

## 2018-09-26 PROCEDURE — 00000402 ZZHCL STATISTIC TOTAL PROTEIN: Performed by: INTERNAL MEDICINE

## 2018-09-26 PROCEDURE — 82310 ASSAY OF CALCIUM: CPT | Performed by: INTERNAL MEDICINE

## 2018-09-26 PROCEDURE — 84165 PROTEIN E-PHORESIS SERUM: CPT | Performed by: INTERNAL MEDICINE

## 2018-09-26 PROCEDURE — 99000 SPECIMEN HANDLING OFFICE-LAB: CPT | Performed by: INTERNAL MEDICINE

## 2018-09-26 PROCEDURE — 82330 ASSAY OF CALCIUM: CPT | Performed by: INTERNAL MEDICINE

## 2018-09-26 PROCEDURE — 82542 COL CHROMOTOGRAPHY QUAL/QUAN: CPT | Mod: 90 | Performed by: INTERNAL MEDICINE

## 2018-09-26 PROCEDURE — 82941 ASSAY OF GASTRIN: CPT | Mod: 90 | Performed by: INTERNAL MEDICINE

## 2018-09-26 PROCEDURE — 82565 ASSAY OF CREATININE: CPT | Performed by: INTERNAL MEDICINE

## 2018-09-26 PROCEDURE — 82306 VITAMIN D 25 HYDROXY: CPT | Performed by: INTERNAL MEDICINE

## 2018-09-26 NOTE — NURSING NOTE
"ENDOCRINOLOGY INTAKE FORM    Patient Name:  Devyn Link  :  1945    Is patient Diabetic?   No  Does patient have non-diabetic or other endocrine issues?  Yes: hyperparathyroidism    Vitals: /72 (BP Location: Left arm, Patient Position: Chair, Cuff Size: Adult Regular)  Pulse 77  Temp 97.4  F (36.3  C) (Oral)  Ht 1.657 m (5' 5.25\")  Wt 51.1 kg (112 lb 9.6 oz)  LMP  (LMP Unknown)  SpO2 99%  Breastfeeding? No  BMI 18.59 kg/m2  BMI= Body mass index is 18.59 kg/(m^2).    Flu vaccine:  Yes:   Pneumonia vaccine:  Yes: both    Smoking and Alcohol use:  Social History   Substance Use Topics     Smoking status: Never Smoker     Smokeless tobacco: Never Used     Alcohol use No       Dominique Perez CMA    "

## 2018-09-26 NOTE — MR AVS SNAPSHOT
After Visit Summary   2018    Devyn Link    MRN: [id number]           Patient Information     Date Of Birth          1945        Visit Information        Provider Department      2018 2:30 PM Keisha Castro MD; MULTILINGUAL WORD [facility]        Today's Diagnoses     Hyperparathyroidism (H)    -  1    Other osteoporosis without current pathological fracture        Hypercalcemia          Care Instructions    [facility] [place] & [place] locations   Dr Castro, Endocrinology Department      [facility]   [address]  [place] [address]  Appointment [phone]  Fax: [phone]  [place]: Monday and Tuesday         [place] [address]  [place] [address]  Appointment [phone]  Fax: [phone]  [place]: Wednesday and Thursday            Labs today.  Follow up with radiology for NM parathyroid scan.  Follow up after above.     [facility] radiology scheduleing   [place]  [phone]   [facility] Radiology scheduling  [place]  [phone]     Please call and schedule the recommended test as discussed in clinic visit. These are the numbers to call.            Follow-ups after your visit        Your next 10 appointments already scheduled     Oct 25, 2018 10:50 AM CDT   PHYSICAL with Prisca Lucero MD   [facility]    [address] [address] [phone]              Future tests that were ordered for you today     Open Future Orders        Priority Expected Expires Ordered    Protein electrophoresis, timed urine Routine  2019    NM Parathyroid Planar w/Spect & CT Dual Routine  2019            Who to contact     If you have questions or need follow up information about today's clinic visit or your schedule please contact [facility] directly at [phone].  Normal or non-critical lab and imaging results will be communicated to you by MyChart, letter or phone within 4 business days after the clinic has received the results. If you do not hear from us within 7 days, please contact the clinic through MyChart or phone. If you have a critical or abnormal lab result, we will notify you by phone as soon as possible.  Submit refill requests through [employer] or call your pharmacy and they will forward the refill request to us. Please allow 3 business days for your refill to be completed.          Additional Information About Your Visit        Care EveryWhere ID     This is your Care EveryWhere ID. This could be used by other organizations to access your [place] medical records  [id number]        Your Vitals Were     Pulse Temperature Height Last Period Pulse Oximetry Breastfeeding?    77 97.4  F (36.3  C) (Oral) 1.657 m (5' 5.25\") (LMP Unknown) 99% No    BMI (Body Mass Index)                   18.59 kg/m2            Blood Pressure from Last 3 Encounters:   09/26/18 150/72   09/10/18 120/62   07/19/18 122/66    Weight from Last 3 Encounters:   09/26/18 51.1 kg (112 lb 9.6 oz)   09/10/18 49.7 kg (109 lb 9.6 oz)   07/19/18 51.2 kg (112 lb 12.8 oz)              We Performed the Following     Calcium ionized     Calcium     Creatinine     Gastrin     Magnesium     Parathyroid Hormone Intact     Phosphorus     Protein electrophoresis     PTH Related Peptide Test     Vitamin D Deficiency        Primary Care Provider Office Phone # Fax #    Prisca Lucero [phone] [address] [address]        Equal Access to Services     [facility]: Hadii eun Méndez, waaxda luqadaha, qaybta kaaljose m maldonado. So [facility] [phone].    ATENCIÓN: Si habla español, tiene a muir disposición servicios gratuitos de asistencia lingüística. Llame al " [phone].    We comply with applicable federal civil rights laws and [place] laws. We do not discriminate on the basis of race, color, national origin, age, disability, sex, sexual orientation, or gender identity.            Thank you!     Thank you for choosing [facility]  for your care. Our goal is always to provide you with excellent care. Hearing back from our patients is one way we can continue to improve our services. Please take a few minutes to complete the written survey that you may receive in the mail after your visit with us. Thank you!             Your Updated Medication List - Protect others around you: Learn how to safely use, store and throw away your medicines at www.disposemymeds.org.          This list is accurate as of 9/26/18  3:15 PM.  Always use your most recent med list.                   Brand Name Dispense Instructions for use Diagnosis    aspirin 81 MG tablet      Take 81 mg by mouth daily        ASPIRIN NOT PRESCRIBED    INTENTIONAL    0 each    Please choose reason not prescribed, below    Anticoagulated on Coumadin       carvedilol 3.125 MG tablet    COREG    60 tablet    Take 1 tablet (3.125 mg) by mouth 2 times daily (with meals)        IRON SUPPLEMENT PO      Take 325 mg by mouth Every other day        LASIX 20 MG tablet   Generic drug:  furosemide     60 tablet    Take 20 mg by mouth daily    Heart failure (H), Hypertension goal BP (blood pressure) < 140/90       LIPITOR 20 MG tablet   Generic drug:  atorvastatin     90 tablet    Take 1 tablet (20 mg) by mouth daily        losartan 25 MG tablet    COZAAR     Take 25 mg by mouth daily        nitroGLYcerin 0.4 MG sublingual tablet    NITROSTAT    25 tablet    Place 1 tablet under the tongue every 5 minutes as needed for chest pain.    CAD (coronary artery disease)       pantoprazole 40 MG EC tablet    PROTONIX    90 tablet    Take 1 tablet (40 mg) by mouth daily    History of ulcer disease       warfarin 1 MG  tablet    COUMADIN    30 tablet    As per INR clinic at [facility] [place]    Anticoagulated on Coumadin

## 2018-09-26 NOTE — PATIENT INSTRUCTIONS
[facility] & [place] locations   Dr Castro, Endocrinology Department      [facility]   [address]  [place] [address]  Appointment [phone]  Fax: [phone]  [place]: Monday and Tuesday         [facility]   [address]  [place] [address]  Appointment [phone]  Fax: [phone]  [place]: Wednesday and Thursday            Labs today.  Follow up with radiology for NM parathyroid scan.  Follow up after above.     [facility] radiology scheduleing   [place]  [phone]   [facility] Radiology scheduling  [place]  [phone]     Please call and schedule the recommended test as discussed in clinic visit. These are the numbers to call.

## 2018-09-26 NOTE — PROGRESS NOTES
Name: Devyn Link  Seen in consultation with Prisca Lucero for hyperPTH.  HPI:  Devyn Link is a 72 year old female who presents for the evaluation of hyperPTH.  High calcium has been noted since 2007 on and off.  Other past medical history include CAD, history of aortic valve replacement, hypertension, mitral valve replacement, pacemaker in place, prediabetes, osteoporosis without current pathological fracture.  Was on amiodarone in past and now off X 6 months. Was on it for about 2-3 years.  DEXA 2017 consistent with osteoporosis.  She is not on medication at this time.    History of Cancer:No  Thiazide Diuretic:No  Lithium use:No  Family History of pituitary adenoma, pancreas tumors, Zollinger-Diaz syndrome, pheochromocytoma. No  Kidney stones:No  Fractures: No  Abdominal Pain:Yes (Please explain): sometimes. H/o peptic ulcer. Is followed up by GI.  Cramps: No  Constipation: sometimes  Muscle Aches or pains: No  Muscle twitches: No  Nausea and vomiting: sometimes  Confusion Lethargy and fatigue: better now  ON medications like Lithium/ HCTZ: no  Average Daily Calcium intake: dairy- not much  Ca and Vit D supplementation: no    PMH/PSH:  Past Medical History:   Diagnosis Date     Hypertension      Myocardial infarction      Pacemaker      Past Surgical History:   Procedure Laterality Date     C REPLACEMENT OF MITRAL VALVE  1994    Done at [facility]     COLONOSCOPY  06/13/2018    Dr. Alvarez [facility]     COLONOSCOPY N/A 6/13/2018    Procedure: COLONOSCOPY;;  Surgeon: Rosario Alvarez MD;  Location:  GI     ESOPHAGOSCOPY, GASTROSCOPY, DUODENOSCOPY (EGD), COMBINED  06/13/2018    Dr. Kim KEITH     ESOPHAGOSCOPY, GASTROSCOPY, DUODENOSCOPY (EGD), COMBINED N/A 6/13/2018    Procedure: COMBINED ESOPHAGOSCOPY, GASTROSCOPY, DUODENOSCOPY (EGD), BIOPSY SINGLE OR MULTIPLE;  COMBINED ESOPHAGOSCOPY, GASTROSCOPY, DUODENOSCOPY (EGD) with biopsies COLONOSCOPY   ;  Surgeon: Rosario Alvarez MD;  Location: [facility]     HEART  "CATH DRUG ELUTING STENT PLACEMENT  2004/2007    seeing Cardiologist at [facility]     SURGICAL HISTORY OF -   ~ 1995    mechanical aortic valve     SURGICAL HISTORY OF -       pacemaker     SURGICAL HISTORY OF -       mechanical mitral valve     Family Hx:  Family History   Problem Relation Age of Onset     Hypertension Mother      Cancer Mother      lung     HEART DISEASE Mother      Cerebrovascular Disease Mother      Hypertension Sister      Colon Cancer No family hx of             Social Hx:  Social History     Social History     Marital status:      Spouse name: N/A     Number of children: N/A     Years of education: N/A     Occupational History     Not on file.     Social History Main Topics     Smoking status: Never Smoker     Smokeless tobacco: Never Used     Alcohol use No     Drug use: No     Sexual activity: Yes     Partners: Male     Other Topics Concern     Parent/Sibling W/ Cabg, Mi Or Angioplasty Before 65f 55m? Yes     Social History Narrative          MEDICATIONS:  has a current medication list which includes the following prescription(s): aspirin, aspirin not prescribed, atorvastatin, carvedilol, ferrous sulfate, furosemide, losartan, nitroglycerin, pantoprazole, and warfarin.    ROS     ROS: 10 point ROS neg other than the symptoms noted above in the HPI.    Physical Exam   VS: LMP  (LMP Unknown)   /72 (BP Location: Left arm, Patient Position: Chair, Cuff Size: Adult Regular)  Pulse 77  Temp 97.4  F (36.3  C) (Oral)  Ht 1.657 m (5' 5.25\")  Wt 51.1 kg (112 lb 9.6 oz)  LMP  (LMP Unknown)  SpO2 99%  Breastfeeding? No  BMI 18.59 kg/m2    GENERAL: AXOX3, NAD, well dressed, answering questions appropriately, appears stated age.  HEENT: OP clear, no LAD, no TM, non-tender, no exopthalmous, no proptosis, EOMI, no lig lag, no retraction  NECK: Thyroid normal in size, non tender, no nodules were palpated  CV: RRR, no rubs, gallops, no murmurs  LUNGS: CTAB, no wheezes, rales, or " pedro  ABDOMEN: +BS  EXTREMITIES: no edema, +pulses, no rashes, no lesions  NEUROLOGY: CN grossly intact, + DTR upper and lower extremity, no tremors  MSK: grossly intact  SKIN: no rashes, no lesions  PSYCH: normal affect and mood    LABS:  Calcium:  ENDO CALCIUM LABS-[facility] Latest Ref Rng & Units 9/10/2018 8/17/2018   CALCIUM 8.5 - 10.1 mg/dL 11.0 (H) 9.8     ENDO CALCIUM LABS-UMP Latest Ref Rng & Units 6/22/2018 6/14/2018   CALCIUM 8.5 - 10.1 mg/dL 10.2 (H)      ENDO CALCIUM LABS-[facility] Latest Ref Rng & Units 6/5/2018   CALCIUM 8.5 - 10.1 mg/dL 10.6 (H)       PTH:  ENDO CALCIUM LABS-UMP Latest Ref Rng & Units 6/22/2018   PARATHYROID HORMONE INTACT 18 - 80 pg/mL 97 (H)     Vitamin D:  Lab Results   Component Value Date    VITDT 27 06/22/2018       TFTs:  Lab Results   Component Value Date    TSH 0.87 04/02/2018     DEXA 10/2017:  REFERENCE T-SCORES:       Normal                -1.0 and greater                                 Osteopenia         Between -1.0 and -2.5                                           Osteoporosis     -2.5 and less                                       RISK FACTORS:  Post-menopausal, Height loss of 1.75 inches, Follow-up osteopenia, Low body weight     CURRENT TREATMENT:  Calcium, Vitamin D      FINDINGS:               Lumbar Spine (L1-L2; due to significant degenerative changes at L3 and L4, these were omitted)      T-score:  -3.3               Left Femoral Neck                      T-score:  -2.9               Right Femoral Neck                    T-score:  -3.0                             Lumbar (L1-L4) BMD: 0.876                                                           Total Hip Mean BMD: 0.663                    LATERAL VERTEBRAL ASSESSMENT  Procedure:  Vertebral fracture assessment was performed in the lateral decubitus position using a Lunar Prodigy  densitometer.  Indications for VFA: T-score of -1.0 or worse and age (female>69)  Confounding factors for VFA: Arthritis/degenerative disc  disease, rib shadows.  The LVA scan is interpretable from T7 to L4.     VFA Findings: Using the semi-quantitative analysis of [employer] there was evidence of no spinal deformity  VFA Impression: Devyn Link has no vertebral fractures identified on the VFA.          IMPRESSION  Osteoporosis  Degenerative changes of the spine  Recommendations include ensuring adequate daily Calcium and Vitamin D intake  Follow up scan can be considered in three years.     Patient had a study performed previously, however the prior images are not available to compare to the current study.     Current NOF guidelines recommend treatment for patients with the following:  - Prior hip or vertebral fracture  - T-score -2.5 or below  - A 10 year risk of any major osteoporotic fracture >20% or 10 year risk of hip fracture >3%, as calculated using the FRAX calculator (www.shef.ac.uk/FRAX).       Based on these guidelines, treatment (in addition to calcium and vitamin D) is recommended for this patient, after ruling out other causes of osteoporosis/low bone density.  While this is meant as an aid to clinical decision-making, clinical judgment must still be used.     All pertinent notes, labs, and images personally reviewed by me.     A/P  Ms.Xiang Link is a 72 year old here for the evaluation of:    1. Hyperparathyroidism:  In the differential diagnosis of hypercalcemia, primary hyperparathyroidism is the most common cause. It is important to distinguish readily between primary hyperparathyroidism and other causes of hypercalcemia.    High calcium levels noted on and off since 2007.  More consistently high calcium level since 2017  Slightly elevated parathyroid hormone  Kidney function is in normal range  Vitamin D was in low normal range  Not on thaizide diuretic or lithium.  History of osteoporosis based on DEXA scan done 2017.  She is not on medication at this time  History of peptic ulcer.  Followed by GI  Plan:  Discussed diagnosis,  pathophysiology, management and treatment options of condition with pt.  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  Plan: Magnesium, Parathyroid Hormone Intact,         Phosphorus, Vitamin D Deficiency, Creatinine,         Calcium, Calcium ionized, Protein         electrophoresis, Protein electrophoresis, timed        urine, PTH Related Peptide Test, NM Parathyroid        Planar w/Spect & CT Dual, Gastrin         2.  Osteoporosis:  DEXA 2017 c/w osteoporosis  Not on medication at this time  Labs work as noted above  Consider medication options after that.  I also encouraged her to discuss medication options with primary care provider Dr. Lucero.  The pt was advised to    Maintain an adequate calcium and vitamin D intake and to supplement vitamin D if needed to maintain serum levels of 25 hydroxy D (25 OH D) between 30-60 ng/ml.    Limit alcohol intake to no more than 2 servings per day.    Limit caffeine intake.    Maintain an active lifestyle including weight-bearing exercises for at least 30 mins daily.    Take measures to reduce the risk of falling.    3. HTN:  Patient to follow up with Primary Care provider regarding elevated blood pressure.      HEREDITARY HYPERPARATHYROID STATES  Multiple Endocrine Neoplasia (MEN), both type 1 and type II.  Primary hyperparathyroidism is often the first and is the most common of the endocrinopathies in MEN1, reaching nearly 100% penetrance by the age of 50. In MEN I, the other tumors, besides the parathyroids, that can develop are those of pancreas and anterior pituitary glands. Involvement of two of the three glands confirms the presence of MEN I. MEN IIa, in which hyperparathyroidism can be associated with medullary thyroid cancer.    Hyperparathyroidism jaw tumor syndrome (AD), is associated with PHPT and fibromas in the mandible or the maxilla. Unlike  FHH or the MEN I and II, parathyroid carcinoma is more common in hyperparathyroidism jaw tumor syndrome.     Familial hypocalciuric hypercalcemia (FHH). This presentation that can be confused with the most common form of primary hyperparathyroidism, namely the sporadic isolated disorder. FHH is also known as Familial Benign Hypercalcemia because it is generally asymptomatic and does not require treatment.  Lab work to differentiate FHH from primary PTH Hypercalcemia, Hypocalciuria, Normal to high levels of PTH, Hypermagnesemia, Calcium/creatinine clearance ratio <0.01, and Genetic testing for mutations in CASR.     The serum calcium determination is typically not greater than 1 mg/dl above the upper limits of normal. The serum phosphorus is in the lower range of normal with only approximately 25% of patients showing phosphorus levels that are frankly low. Total alkaline phosphatase activity is in the high normal range as is the case also for more specific markers of bone turnover and, bone-specific alkaline phosphatase activity. If the normal concentration of 25-hydroxyvitamin D level is taken to be >30 ng/ml, then most patients with primary hyperparathyroidism will have low levels. In contrast, the 1,25-dihydroxyvitamin D level tends to be in the upper range of normal and, in fact, frankly elevated in 25% of patients with primary hyperparathyroidism.    Guidelines for parathyroid surgery in asymptomatic primary hyperparathyroidism:    Serum Calcium >1.0 mg/dl (0.25 mmol/L) above normal   Creatinine Clearance (calculated) Below 60m1/min /1.73 m2)   Bone Mineral Density T score < -2.5 SD at spine, hip (total or femoral neck) or radius (distal 1/3 site) or presence of fragility fracture   Age Age < 50 years        PARATHYROID GLAND IMAGING  Pre- operative imaging is of value prior to surgery in the localization of abnormal parathyroid tissue . The diagnosis of PHPT is based on the biochemical findings and is not  affected by the results of the imaging studies. Sestamibi imaging is of value in localizing abnormal parathyroid tissue. The sensitivity and specificity varies among institutions.    Today we will obtain: BMP,Ca, Nilson, Phosphorus, PTH, albumin, Vitamin D (25 and 1-25), 24hour urine calcium, Sestamibi, and DEXA. Can consider Abdominal Xray for Kidney stones.      More than 50% of the time spent with Ms. Link on counseling / coordinating her care.     Follow-up:  After above.    Keisha Castro MD  Endocrinology   [facility]  September 26, 2018  CC: Prisca Lucero      Addendum to above note and clinic visit:    Labs reviewed.    See result note/telephone encounter.

## 2018-09-27 LAB
CALCIUM SERPL-MCNC: 10.1 MG/DL (ref 8.5–10.1)
CREAT SERPL-MCNC: 0.58 MG/DL (ref 0.52–1.04)
GFR SERPL CREATININE-BSD FRML MDRD: >90 ML/MIN/1.7M2
MAGNESIUM SERPL-MCNC: 2.2 MG/DL (ref 1.6–2.3)
PHOSPHATE SERPL-MCNC: 2.3 MG/DL (ref 2.5–4.5)

## 2018-09-27 PROCEDURE — 84166 PROTEIN E-PHORESIS/URINE/CSF: CPT | Performed by: INTERNAL MEDICINE

## 2018-09-27 PROCEDURE — 81050 URINALYSIS VOLUME MEASURE: CPT | Performed by: INTERNAL MEDICINE

## 2018-09-27 PROCEDURE — 82340 ASSAY OF CALCIUM IN URINE: CPT | Performed by: INTERNAL MEDICINE

## 2018-09-28 DIAGNOSIS — E21.3 HYPERPARATHYROIDISM (H): ICD-10-CM

## 2018-09-28 DIAGNOSIS — E83.52 HYPERCALCEMIA: ICD-10-CM

## 2018-09-28 LAB
ALBUMIN SERPL ELPH-MCNC: 4.2 G/DL (ref 3.7–5.1)
ALPHA1 GLOB SERPL ELPH-MCNC: 0.3 G/DL (ref 0.2–0.4)
ALPHA2 GLOB SERPL ELPH-MCNC: 0.5 G/DL (ref 0.5–0.9)
B-GLOBULIN SERPL ELPH-MCNC: 0.7 G/DL (ref 0.6–1)
CALCIUM 24H UR-MRATE: 0.17 G/24 H (ref 0.1–0.3)
CALCIUM UR-MCNC: 9.3 MG/DL
CALCIUM/CREAT UR: 0.3 G/G CR
COLLECT DURATION TIME UR: 24 H
CREAT 24H UR-MRATE: 0.56 G/(24.H) (ref 0.8–1.8)
CREAT UR-MCNC: 31 MG/DL
DEPRECATED CALCIDIOL+CALCIFEROL SERPL-MC: 37 UG/L (ref 20–75)
GAMMA GLOB SERPL ELPH-MCNC: 1.2 G/DL (ref 0.7–1.6)
GASTRIN SERPL-MCNC: 423 PG/ML (ref 0–100)
M PROTEIN SERPL ELPH-MCNC: 0 G/DL
PROT PATTERN SERPL ELPH-IMP: NORMAL
SPECIMEN VOL UR: 1800 ML

## 2018-10-02 LAB — PROT PATTERN UR ELPH-IMP: NORMAL

## 2018-10-10 ENCOUNTER — HOSPITAL ENCOUNTER (OUTPATIENT)
Dept: MAMMOGRAPHY | Facility: CLINIC | Age: 73
Discharge: HOME | End: 2018-10-10
Attending: FAMILY MEDICINE | Admitting: FAMILY MEDICINE
Payer: COMMERCIAL

## 2018-10-10 DIAGNOSIS — Z12.31 VISIT FOR SCREENING MAMMOGRAM: ICD-10-CM

## 2018-10-10 PROCEDURE — 77067 SCR MAMMO BI INCL CAD: CPT

## 2018-10-17 LAB — PTH RELATED PROT SERPL-SCNC: 2.6 PMOL/L (ref 0–3.4)

## 2018-10-25 ENCOUNTER — OFFICE VISIT (OUTPATIENT)
Dept: FAMILY MEDICINE | Facility: CLINIC | Age: 73
End: 2018-10-25
Payer: COMMERCIAL

## 2018-10-25 VITALS
BODY MASS INDEX: 18.18 KG/M2 | WEIGHT: 109.1 LBS | TEMPERATURE: 97.5 F | SYSTOLIC BLOOD PRESSURE: 128 MMHG | OXYGEN SATURATION: 97 % | DIASTOLIC BLOOD PRESSURE: 78 MMHG | HEART RATE: 63 BPM | RESPIRATION RATE: 16 BRPM | HEIGHT: 65 IN

## 2018-10-25 DIAGNOSIS — I48.20 CHRONIC ATRIAL FIBRILLATION (H): ICD-10-CM

## 2018-10-25 DIAGNOSIS — R92.30 DENSE BREASTS: ICD-10-CM

## 2018-10-25 DIAGNOSIS — D50.9 IRON DEFICIENCY ANEMIA, UNSPECIFIED IRON DEFICIENCY ANEMIA TYPE: ICD-10-CM

## 2018-10-25 DIAGNOSIS — I25.10 CORONARY ARTERY DISEASE INVOLVING NATIVE CORONARY ARTERY OF NATIVE HEART WITHOUT ANGINA PECTORIS: ICD-10-CM

## 2018-10-25 DIAGNOSIS — Z79.01 LONG TERM CURRENT USE OF ANTICOAGULANT: ICD-10-CM

## 2018-10-25 DIAGNOSIS — E78.5 HYPERLIPIDEMIA LDL GOAL <70: ICD-10-CM

## 2018-10-25 DIAGNOSIS — Z00.00 ENCOUNTER FOR ROUTINE ADULT HEALTH EXAMINATION WITHOUT ABNORMAL FINDINGS: Primary | ICD-10-CM

## 2018-10-25 LAB
ERYTHROCYTE [DISTWIDTH] IN BLOOD BY AUTOMATED COUNT: 17.7 % (ref 10–15)
HCT VFR BLD AUTO: 38.5 % (ref 35–47)
HGB BLD-MCNC: 11.9 G/DL (ref 11.7–15.7)
MCH RBC QN AUTO: 27.4 PG (ref 26.5–33)
MCHC RBC AUTO-ENTMCNC: 30.9 G/DL (ref 31.5–36.5)
MCV RBC AUTO: 89 FL (ref 78–100)
PLATELET # BLD AUTO: 208 10E9/L (ref 150–450)
RBC # BLD AUTO: 4.34 10E12/L (ref 3.8–5.2)
WBC # BLD AUTO: 5.5 10E9/L (ref 4–11)

## 2018-10-25 PROCEDURE — 36415 COLL VENOUS BLD VENIPUNCTURE: CPT | Performed by: FAMILY MEDICINE

## 2018-10-25 PROCEDURE — 99207 C PAF COMPLETED  NO CHARGE: CPT | Mod: 25 | Performed by: FAMILY MEDICINE

## 2018-10-25 PROCEDURE — 80053 COMPREHEN METABOLIC PANEL: CPT | Performed by: FAMILY MEDICINE

## 2018-10-25 PROCEDURE — 99397 PER PM REEVAL EST PAT 65+ YR: CPT | Performed by: FAMILY MEDICINE

## 2018-10-25 PROCEDURE — 85027 COMPLETE CBC AUTOMATED: CPT | Performed by: FAMILY MEDICINE

## 2018-10-25 PROCEDURE — 80061 LIPID PANEL: CPT | Performed by: FAMILY MEDICINE

## 2018-10-25 PROCEDURE — 99213 OFFICE O/P EST LOW 20 MIN: CPT | Mod: 25 | Performed by: FAMILY MEDICINE

## 2018-10-25 ASSESSMENT — ENCOUNTER SYMPTOMS
HEMATURIA: 0
WEAKNESS: 1
CONSTIPATION: 0
DYSURIA: 0
JOINT SWELLING: 0
PALPITATIONS: 0
DIZZINESS: 0
FEVER: 0
HEARTBURN: 0
COUGH: 0
NERVOUS/ANXIOUS: 0
SHORTNESS OF BREATH: 0
NAUSEA: 0
HEADACHES: 0
BREAST MASS: 1
CHILLS: 0
SORE THROAT: 0
ABDOMINAL PAIN: 0
HEMATOCHEZIA: 0
FREQUENCY: 0
EYE PAIN: 0
ARTHRALGIAS: 0
MYALGIAS: 0
PARESTHESIAS: 0
DIARRHEA: 0

## 2018-10-25 ASSESSMENT — ACTIVITIES OF DAILY LIVING (ADL)
CURRENT_FUNCTION: MEDICATION ADMINISTRATION REQUIRES ASSISTANCE
I_NEED_ASSISTANCE_FOR_THE_FOLLOWING_DAILY_ACTIVITIES:: MEDICATION ADMINISTRATION
CURRENT_FUNCTION: NO ASSISTANCE NEEDED
I_NEED_ASSISTANCE_FOR_THE_FOLLOWING_DAILY_ACTIVITIES:: NO ASSISTANCE IS NEEDED

## 2018-10-25 NOTE — PROGRESS NOTES
SUBJECTIVE:   Devyn Link is a 72 year old female who presents for Preventive Visit.      Are you in the first 12 months of your Medicare coverage?  No    Physical   Annual:     Getting at least 3 servings of Calcium per day:  Yes    Bi-annual eye exam:  Yes    Dental care twice a year:  NO    Sleep apnea or symptoms of sleep apnea:  None    Diet:  Regular (no restrictions)    Frequency of exercise:  None    Taking medications regularly:  Yes    Medication side effects:  Other    Additional concerns today:  No    Ability to successfully perform activities of daily living: medication administration requires assistance and no assistance needed    Home Safety:  Lack of grab bars in the bathroom    Hearing Impairment: no hearing concerns        Fall risk:  Fallen 2 or more times in the past year?: No  Any fall with injury in the past year?: No    COGNITIVE SCREEN  1) Repeat 3 items (Leader, Season, Table)    2) Clock draw: NORMAL  3) 3 item recall: Recalls 3 objects  Results: 3 items recalled: COGNITIVE IMPAIRMENT LESS LIKELY    Mini-CogTM Copyright S Dov. Licensed by the author for use in [facility]; reprinted with permission (soob@[facility]). All rights reserved.        Reviewed and updated as needed this visit by clinical staff         Reviewed and updated as needed this visit by Provider        Social History   Substance Use Topics     Smoking status: Never Smoker     Smokeless tobacco: Never Used     Alcohol use No       No flowsheet data found.            Today's PHQ-2 Score:   PHQ-2 ( 1999 Pfizer) 9/10/2018   Q1: Little interest or pleasure in doing things 0   Q2: Feeling down, depressed or hopeless 0   PHQ-2 Score 0       Do you feel safe in your environment - Yes    Do you have a Health Care Directive?: No: Advance care planning reviewed with patient; information given to patient to review.    Current providers sharing in care for this patient include:   Patient Care Team:  Prisca Lucero MD as PCP -  General (Family Practice)    The following health maintenance items are reviewed in Epic and correct as of today:  Health Maintenance   Topic Date Due     ADVANCE DIRECTIVE PLANNING Q5 YRS  02/25/2018     ALT Q1 YR  09/01/2018     LIPID MONITORING Q1 YEAR  09/01/2018     BMP Q6 MOS  03/10/2019     FALL RISK ASSESSMENT  09/10/2019     CBC Q1 YR  09/10/2019     PHQ-2 Q1 YR  09/10/2019     HF ACTION PLAN Q3 YR  01/03/2020     MAMMO SCREEN Q2 YR (SYSTEM ASSIGNED)  10/10/2020     TETANUS IMMUNIZATION (SYSTEM ASSIGNED)  04/16/2024     COLON CANCER SCREEN (SYSTEM ASSIGNED)  06/13/2028     DEXA SCAN SCREENING (SYSTEM ASSIGNED)  Completed     PNEUMOCOCCAL  Completed     INFLUENZA VACCINE  Completed         Patient Active Problem List   Diagnosis     Hyperlipidemia LDL goal <70     CAD (coronary artery disease)     Long term current use of anticoagulant     Hypertension goal BP (blood pressure) < 140/90     Advanced directives, counseling/discussion     Bradycardia     Health Care Home     Chronic atrial fibrillation (H)     Heart failure (H)     Anemia     Pacemaker     Aortic valve replaced     Mitral valve replaced     Abnormal glucose     Prediabetes     Other osteoporosis without current pathological fracture     Iron deficiency anemia, unspecified iron deficiency anemia type     Hyperparathyroidism (H)     Hypercalcemia       Past Medical History:   Diagnosis Date     Hypertension      Myocardial infarction (H)      Pacemaker        Past Surgical History:   Procedure Laterality Date     C REPLACEMENT OF MITRAL VALVE  1994    Done at [facility]     COLONOSCOPY  06/13/2018    Dr. Alvarez [facility]     COLONOSCOPY N/A 6/13/2018    Procedure: COLONOSCOPY;;  Surgeon: Rosario Alvarez MD;  Location:  GI     ESOPHAGOSCOPY, GASTROSCOPY, DUODENOSCOPY (EGD), COMBINED  06/13/2018    Dr. Alvarez [facility]     ESOPHAGOSCOPY, GASTROSCOPY, DUODENOSCOPY (EGD), COMBINED N/A 6/13/2018    Procedure: COMBINED ESOPHAGOSCOPY, GASTROSCOPY,  DUODENOSCOPY (EGD), BIOPSY SINGLE OR MULTIPLE;  COMBINED ESOPHAGOSCOPY, GASTROSCOPY, DUODENOSCOPY (EGD) with biopsies COLONOSCOPY   ;  Surgeon: Rosario Alvarez MD;  Location:  GI     HEART CATH DRUG ELUTING STENT PLACEMENT      seeing Cardiologist at [facility]     SURGICAL HISTORY OF -   ~     mechanical aortic valve     SURGICAL HISTORY OF -       pacemaker     SURGICAL HISTORY OF -       mechanical mitral valve       Obstetric History       T1      L1     SAB0   TAB0   Ectopic0   Multiple0   Live Births0       # Outcome Date GA Lbr Velasquez/2nd Weight Sex Delivery Anes PTL Lv   1 Term                   Current Outpatient Prescriptions   Medication Sig Dispense Refill     aspirin 81 MG tablet Take 81 mg by mouth daily       atorvastatin (LIPITOR) 20 MG tablet Take 1 tablet (20 mg) by mouth daily 90 tablet 1     carvedilol (COREG) 3.125 MG tablet Take 1 tablet (3.125 mg) by mouth 2 times daily (with meals) 60 tablet      Ferrous Sulfate (IRON SUPPLEMENT PO) Take 325 mg by mouth Every other day       furosemide (LASIX) 20 MG tablet Take 20 mg by mouth daily  60 tablet 1     losartan (COZAAR) 25 MG tablet Take 25 mg by mouth daily       nitroglycerin (NITROSTAT) 0.4 MG SL tablet Place 1 tablet under the tongue every 5 minutes as needed for chest pain. 25 tablet 0     pantoprazole (PROTONIX) 40 MG EC tablet Take 1 tablet (40 mg) by mouth daily 90 tablet 1     warfarin (COUMADIN) 1 MG tablet As per INR clinic at [facility] [place] 30 tablet        Family History   Problem Relation Age of Onset     Hypertension Mother      Cancer Mother      lung     HEART DISEASE Mother      Cerebrovascular Disease Mother      Hypertension Sister      Colon Cancer No family hx of        Social History   Substance Use Topics     Smoking status: Never Smoker     Smokeless tobacco: Never Used     Alcohol use No       Immunization History   Administered Date(s) Administered     Influenza (High Dose) 3 valent  "vaccine 09/10/2018     Influenza (IIV3) PF 12/13/2005, 11/07/2006, 11/13/2007, 10/28/2008, 10/01/2012, 10/15/2014     Influenza Vaccine IM 3yrs+ 4 Valent IIV4 10/28/2013     Pneumo Conj 13-V (2010&after) 08/05/2015     Pneumococcal 23 valent 04/16/2014     TD (ADULT, 7+) 04/05/2004     TDAP Vaccine (Adacel) 04/16/2014       Here with  and .  Review of Systems   Constitutional: Negative for chills and fever.   HENT: Negative for congestion, ear pain, hearing loss and sore throat.    Eyes: Positive for visual disturbance (Blurry. not a sudden change; sees eye doctor q 2 years.). Negative for pain.   Respiratory: Negative for cough and shortness of breath.    Cardiovascular: Negative for chest pain, palpitations and peripheral edema.   Gastrointestinal: Negative for abdominal pain, constipation, diarrhea, heartburn, hematochezia and nausea.   Breasts:  Positive for breast mass (Got a letter about density.). Negative for tenderness and discharge.   Genitourinary: Negative for dysuria, frequency, genital sores, hematuria, pelvic pain, urgency, vaginal bleeding and vaginal discharge.   Musculoskeletal: Negative for arthralgias, joint swelling and myalgias.   Skin: Negative for rash.   Neurological: Positive for weakness (Chronic.). Negative for dizziness, headaches and paresthesias.   Psychiatric/Behavioral: Negative for mood changes. The patient is not nervous/anxious.          OBJECTIVE:   /78 (BP Location: Right arm, Cuff Size: Adult Regular)  Pulse 63  Temp 97.5  F (36.4  C) (Oral)  Resp 16  Ht 5' 5.25\" (1.657 m)  Wt 109 lb 1.6 oz (49.5 kg)  LMP  (LMP Unknown)  SpO2 97%  BMI 18.02 kg/m2 Estimated body mass index is 18.02 kg/(m^2) as calculated from the following:    Height as of this encounter: 5' 5.25\" (1.657 m).    Weight as of this encounter: 109 lb 1.6 oz (49.5 kg).  Physical Exam  GENERAL APPEARANCE: alert and no distress  EYES: Eyes grossly normal to inspection, PERRL and " conjunctivae and sclerae normal  HENT: ear canals and TM's normal, nose and mouth without ulcers or lesions, oropharynx clear and oral mucous membranes moist  NECK: no adenopathy, no asymmetry, masses, or scars and thyroid normal to palpation  RESP: lungs clear to auscultation - no rales, rhonchi or wheezes  BREAST: normal without masses, tenderness or nipple discharge and no palpable axillary masses or adenopathy  CV: sys murmur and crisp valve sounds.  ABDOMEN: soft, nontender, no hepatosplenomegaly, no masses and bowel sounds normal  MS: no musculoskeletal defects are noted and gait is age appropriate without ataxia  SKIN: no suspicious lesions or rashes  NEURO: Normal strength and tone, sensory exam grossly normal, mentation intact and speech normal  PSYCH: mentation appears normal and affect normal/bright      Recent Labs   Lab Test  09/01/17   1137 03/16/16  08/06/15   0835 03/16/15   CHOL  156  148  156  148   HDL  52  44  49*  44   LDL  79  85  79  85   TRIG  124  95  142  95   CHOLHDLRATIO   --    --   3.2  3.36     Hemoglobin   Date Value Ref Range Status   09/10/2018 11.2 (L) 11.7 - 15.7 g/dL Final     Comment:     Results confirmed by repeat test   ]      She showed me two letters. One was regarding breast density. The other was a letter about her current INR clinic recommending transitioning to PCP    ASSESSMENT / PLAN:     Encounter for routine adult health examination without abnormal findings  Discussed Shingrix.     Chronic atrial fibrillation (H)  On anticoagulation.  Discussed we can certainly take over management of her coumadin/following INR.   notes they will be getting done at the hospital in November. He will let me know if we are to take over.  Discussed we do have a clinic, and would want things to transition smoothly. To let us know as soon as he finds out.    Long term current use of anticoagulant  As above.     Iron deficiency anemia, unspecified iron deficiency anemia  "type  Improving. She has been feeling better. Continuing to monitor.   - CBC with platelets    Dense breasts  Discussed this. At this time, recommend yearly mammograms. May wish to have the 3D mammograms. To let us know for any breast concerns.     Hyperlipidemia LDL goal <70    - Lipid panel reflex to direct LDL Fasting  - Comprehensive metabolic panel    Coronary artery disease involving native coronary artery of native heart without angina pectoris          End of Life Planning:  Patient currently has an advanced directive: No.  I have verified the patient's ablity to prepare an advanced directive/make health care decisions.  Literature was provided to assist patient in preparing an advanced directive.    COUNSELING:  Reviewed preventive health counseling, as reflected in patient instructions       Regular exercise       Healthy diet/nutrition       Vision screening       Dental care    BP Readings from Last 1 Encounters:   10/25/18 128/78     Estimated body mass index is 18.02 kg/(m^2) as calculated from the following:    Height as of this encounter: 5' 5.25\" (1.657 m).    Weight as of this encounter: 109 lb 1.6 oz (49.5 kg).      Weight management plan noted, stable and monitoring     reports that she has never smoked. She has never used smokeless tobacco.      Appropriate preventive services were discussed with this patient, including applicable screening as appropriate for cardiovascular disease, diabetes, osteopenia/osteoporosis, and glaucoma.  As appropriate for age/gender, discussed screening for colorectal cancer, prostate cancer, breast cancer, and cervical cancer. Checklist reviewing preventive services available has been given to the patient.    Reviewed patients plan of care and provided an AVS. The Basic Care Plan (routine screening as documented in Health Maintenance) for Devyn meets the Care Plan requirement. This Care Plan has been established and reviewed with the Patient and " spouse.    Counseling Resources:  ATP IV Guidelines  Pooled Cohorts Equation Calculator  Breast Cancer Risk Calculator  FRAX Risk Assessment  ICSI Preventive Guidelines  Dietary Guidelines for Americans, 2010  Plyce's MyPlate  ASA Prophylaxis  Lung CA Screening    Prisca Lucero MD, MD  [facility] [place]  Answers for HPI/ROS submitted by the patient on 10/25/2018   PHQ-2 Score: 0

## 2018-10-25 NOTE — LETTER
[facility]  [address] [address] [phone]          October 29, 2018    Devyn Link                                                                                                                     [address] [address] [address]            Dear Hussein Shepard is a copy of your recent results.  Great news!  Your hemoglobin is now normal.  I would recommend that you continue the iron for about a month (to build up your stores) and then recheck again down the line to see if you are keeping up OK.    The calcium is again a little elevated and again, this probably goes along with the hyperparathyroid.    It is always nice to see you and your !  Have a nice rest of the Kim.    (Again, let us know if/when you transfer to our INR clinic; we would like this to be smooth!).      Sincerely,     Prisca Lucero MD

## 2018-10-25 NOTE — MR AVS SNAPSHOT
After Visit Summary   10/25/2018    Devyn Link    MRN: [id number]           Patient Information     Date Of Birth          1945        Visit Information        Provider Department      10/25/2018 10:35 AM Prisca Lucero MD; [facility] [place] [facility]        Today's Diagnoses     Iron deficiency anemia, unspecified iron deficiency anemia type    -  1    Hyperlipidemia LDL goal <70          Care Instructions      Preventive Health Recommendations    Female Ages 65 +    Yearly exam:     See your health care provider every year in order to  o Review health changes.   o Discuss preventive care.    o Review your medicines if your doctor has prescribed any.      You no longer need a yearly Pap test unless you've had an abnormal Pap test in the past 10 years. If you have vaginal symptoms, such as bleeding or discharge, be sure to talk with your provider about a Pap test.      Every 1 to 2 years, have a mammogram.  If you are over 69, talk with your health care provider about whether or not you want to continue having screening mammograms.      Every 10 years, have a colonoscopy. Or, have a yearly FIT test (stool test). These exams will check for colon cancer.       Have a cholesterol test every 5 years, or more often if your doctor advises it.       Have a diabetes test (fasting glucose) every three years. If you are at risk for diabetes, you should have this test more often.       At age 65, have a bone density scan (DEXA) to check for osteoporosis (brittle bone disease).    Shots:    Get a flu shot each year.    Get a tetanus shot every 10 years.    Talk to your doctor about your pneumonia vaccines. There are now two you should receive - Pneumovax (PPSV 23) and Prevnar (PCV 13).    Talk to your pharmacist about the shingles vaccine.    Talk to your doctor about the hepatitis B vaccine.    Nutrition:     Eat at least 5 servings of fruits and vegetables each day.      Eat whole-grain  "bread, whole-wheat pasta and brown rice instead of white grains and rice.      Get adequate Calcium and Vitamin D.     Lifestyle    Exercise at least 150 minutes a week (30 minutes a day, 5 days a week). This will help you control your weight and prevent disease.      Limit alcohol to one drink per day.      No smoking.       Wear sunscreen to prevent skin cancer.       See your dentist twice a year for an exam and cleaning.      See your eye doctor every 1 to 2 years to screen for conditions such as glaucoma, macular degeneration and cataracts.          Follow-ups after your visit        Your next 10 appointments already scheduled     Nov 21, 2018 12:30 PM CST   [facility] Visit with Keisha Castro MD   [facility]    [address]  [facility] [address] [phone]              Who to contact     If you have questions or need follow up information about today's clinic visit or your schedule please contact [facility] directly at [phone].  Normal or non-critical lab and imaging results will be communicated to you by MyChart, letter or phone within 4 business days after the clinic has received the results. If you do not hear from us within 7 days, please contact the clinic through MyChart or phone. If you have a critical or abnormal lab result, we will notify you by phone as soon as possible.  Submit refill requests through [employer] or call your pharmacy and they will forward the refill request to us. Please allow 3 business days for your refill to be completed.          Additional Information About Your Visit        Care EveryWhere ID     This is your Care EveryWhere ID. This could be used by other organizations to access your [place] medical records  [id number]        Your Vitals Were     Pulse Temperature Respirations Height Last Period Pulse Oximetry    63 97.5  F (36.4  C) (Oral) 16 5' 5.25\" (1.657 m) (LMP Unknown) 97%    " BMI (Body Mass Index)                   18.02 kg/m2            Blood Pressure from Last 3 Encounters:   10/25/18 128/78   09/26/18 150/72   09/10/18 120/62    Weight from Last 3 Encounters:   10/25/18 109 lb 1.6 oz (49.5 kg)   09/26/18 112 lb 9.6 oz (51.1 kg)   09/10/18 109 lb 9.6 oz (49.7 kg)              We Performed the Following     CBC with platelets     Comprehensive metabolic panel     Lipid panel reflex to direct LDL Fasting          Today's Medication Changes          These changes are accurate as of 10/25/18 11:30 AM.  If you have any questions, ask your nurse or doctor.               Stop taking these medicines if you haven't already. Please contact your care team if you have questions.     ASPIRIN NOT PRESCRIBED   Commonly known as:  INTENTIONAL   Stopped by:  Prisca Lucero MD                    Primary Care Provider Office Phone # Fax #    Prisca Lucero [phone] [address] [facility] [address]        Equal Access to Services     [facility] AH: Hadii aad ku hadasho Soomaali, waaxda luqadaha, qaybta kaalmada adeegyada, waxay idiin hayjanice culp . So [facility] [phone].    ATENCIÓN: Si habla español, tiene a muir disposición servicios gratuitos de asistencia lingüística. Ll[facility] [phone].    We comply with applicable federal civil rights laws and [place] laws. We do not discriminate on the basis of race, color, national origin, age, disability, sex, sexual orientation, or gender identity.            Thank you!     Thank you for choosing [facility]  for your care. Our goal is always to provide you with excellent care. Hearing back from our patients is one way we can continue to improve our services. Please take a few minutes to complete the written survey that you may receive in the mail after your visit with us. Thank you!             Your Updated Medication List - Protect others around you: Learn how to safely use, store and throw away  your medicines at www.disposemymeds.org.          This list is accurate as of 10/25/18 11:30 AM.  Always use your most recent med list.                   Brand Name Dispense Instructions for use Diagnosis    aspirin 81 MG tablet      Take 81 mg by mouth daily        carvedilol 3.125 MG tablet    COREG    60 tablet    Take 1 tablet (3.125 mg) by mouth 2 times daily (with meals)        IRON SUPPLEMENT PO      Take 325 mg by mouth Every other day        LASIX 20 MG tablet   Generic drug:  furosemide     60 tablet    Take 20 mg by mouth daily    Heart failure (H), Hypertension goal BP (blood pressure) < 140/90       LIPITOR 20 MG tablet   Generic drug:  atorvastatin     90 tablet    Take 1 tablet (20 mg) by mouth daily        losartan 25 MG tablet    COZAAR     Take 25 mg by mouth daily        nitroGLYcerin 0.4 MG sublingual tablet    NITROSTAT    25 tablet    Place 1 tablet under the tongue every 5 minutes as needed for chest pain.    CAD (coronary artery disease)       pantoprazole 40 MG EC tablet    PROTONIX    90 tablet    Take 1 tablet (40 mg) by mouth daily    History of ulcer disease       warfarin 1 MG tablet    COUMADIN    30 tablet    As per INR clinic at [facility] [place]    Anticoagulated on Coumadin

## 2018-10-26 LAB
ALBUMIN SERPL-MCNC: 4.3 G/DL (ref 3.4–5)
ALP SERPL-CCNC: 122 U/L (ref 40–150)
ALT SERPL W P-5'-P-CCNC: 22 U/L (ref 0–50)
ANION GAP SERPL CALCULATED.3IONS-SCNC: 9 MMOL/L (ref 3–14)
AST SERPL W P-5'-P-CCNC: 30 U/L (ref 0–45)
BILIRUB SERPL-MCNC: 1 MG/DL (ref 0.2–1.3)
BUN SERPL-MCNC: 16 MG/DL (ref 7–30)
CALCIUM SERPL-MCNC: 10.9 MG/DL (ref 8.5–10.1)
CHLORIDE SERPL-SCNC: 105 MMOL/L (ref 94–109)
CHOLEST SERPL-MCNC: 147 MG/DL
CO2 SERPL-SCNC: 25 MMOL/L (ref 20–32)
CREAT SERPL-MCNC: 0.62 MG/DL (ref 0.52–1.04)
GFR SERPL CREATININE-BSD FRML MDRD: >90 ML/MIN/1.7M2
GLUCOSE SERPL-MCNC: 98 MG/DL (ref 70–99)
HDLC SERPL-MCNC: 47 MG/DL
LDLC SERPL CALC-MCNC: 74 MG/DL
NONHDLC SERPL-MCNC: 100 MG/DL
POTASSIUM SERPL-SCNC: 4.6 MMOL/L (ref 3.4–5.3)
PROT SERPL-MCNC: 8.1 G/DL (ref 6.8–8.8)
SODIUM SERPL-SCNC: 139 MMOL/L (ref 133–144)
TRIGL SERPL-MCNC: 129 MG/DL

## 2018-11-20 ENCOUNTER — HOSPITAL ENCOUNTER (OUTPATIENT)
Dept: NUCLEAR MEDICINE | Facility: CLINIC | Age: 73
Setting detail: NUCLEAR MEDICINE
End: 2018-11-20
Attending: INTERNAL MEDICINE
Payer: COMMERCIAL

## 2018-11-20 PROCEDURE — A9516 IODINE I-123 SOD IODIDE MIC: HCPCS | Performed by: INTERNAL MEDICINE

## 2018-11-20 PROCEDURE — 34300033 ZZH RX 343: Performed by: INTERNAL MEDICINE

## 2018-11-20 PROCEDURE — T1013 SIGN LANG/ORAL INTERPRETER: HCPCS | Mod: U3

## 2018-11-20 PROCEDURE — A9500 TC99M SESTAMIBI: HCPCS | Performed by: INTERNAL MEDICINE

## 2018-11-20 PROCEDURE — 78072 PARATHYRD PLANAR W/SPECT&CT: CPT

## 2018-11-20 RX ADMIN — Medication 24.6 MILLICURIE: at 14:42

## 2018-11-20 RX ADMIN — Medication 766 UCI.: at 12:40

## 2018-11-21 ENCOUNTER — OFFICE VISIT (OUTPATIENT)
Dept: ENDOCRINOLOGY | Facility: CLINIC | Age: 73
End: 2018-11-21
Payer: COMMERCIAL

## 2018-11-21 VITALS
TEMPERATURE: 97.5 F | BODY MASS INDEX: 18.83 KG/M2 | SYSTOLIC BLOOD PRESSURE: 104 MMHG | WEIGHT: 113 LBS | HEIGHT: 65 IN | OXYGEN SATURATION: 97 % | DIASTOLIC BLOOD PRESSURE: 58 MMHG | HEART RATE: 65 BPM | RESPIRATION RATE: 18 BRPM

## 2018-11-21 DIAGNOSIS — M81.8 OTHER OSTEOPOROSIS WITHOUT CURRENT PATHOLOGICAL FRACTURE: Primary | ICD-10-CM

## 2018-11-21 DIAGNOSIS — E21.3 HYPERPARATHYROIDISM (H): ICD-10-CM

## 2018-11-21 DIAGNOSIS — E83.52 HYPERCALCEMIA: ICD-10-CM

## 2018-11-21 PROCEDURE — 99215 OFFICE O/P EST HI 40 MIN: CPT | Performed by: INTERNAL MEDICINE

## 2018-11-21 RX ORDER — ALENDRONATE SODIUM 70 MG/1
TABLET ORAL
Qty: 12 TABLET | Refills: 3 | Status: SHIPPED | OUTPATIENT
Start: 2018-11-21 | End: 2019-08-15

## 2018-11-21 NOTE — MR AVS SNAPSHOT
After Visit Summary   2018    Devyn Link    MRN: [id number]           Patient Information     Date Of Birth          1945        Visit Information        Provider Department      2018 12:15 PM Keisha Castro MD; [employer] [facility]        Care Instructions    [facility]   Dr Castro, Endocrinology Department      [facility]   [address] [address]  Appointment [phone]  Fax: [phone]   Monday and Tuesday         [place] [address] [facility] [place] [address]  Appointment [phone]  Fax: [phone]  Wednesday and Thursday           Plan to continue to monitor  Labs in 4-6 months  Please make a lab appointment for blood work and follow up clinic appointment in 1 week after that to discuss results.  Start Fosamax 70 mg ONCE a week.    Instructions on Fosamax use and side effects - particularly esophageal adverse events - are carefully reviewed with her. This drug must be taken upon arising for the day on an empty stomach, with a large 6-8 ounce glass of water; she must remain NPO in the upright position for at least 30 minutes afterwards and until after the first food of the day. If esophageal irritation is noted, she will stop the drug and call my office.  Treatment with bisphosphonate therapy will decrease fracture risk 50-70%.   There is risk of osteonecrosis of the jaw in patients using bisphosphonates is approximately 1700-1/100,000, with development most likely related to invasive dental procedures. If an invasive dental procedure was necessary, preferably stop the bisphosphonate approximately 3 months prior to reduce the risk. Let your dentist know that you are on this medication.  The data available at this time suggests that there is probably a small increase risk of atypical  (nontraumatic) subtrochanteric fractures of the femur in patients on bisphosphonate therapy compared to those not on it. One large study suggested that for every 100 fractures prevented with bisphosphonate therapy, less than one femur fracture will occur. Other studies suggest one episode per 2,500 patient years. Patient should call with leg pain.       You should get 1200 mg/day calcium in divided doses of no more than 500 mg/dose.  This INCLUDES what is in your food as well as what is in any supplements you may be taking.    Vit D about 1000 IU/day ( unless you have vit D deficiency- in that case higher dose)  Dietary sources of calcium:: These also contain vitamin D  Milk                            8 oz            300 mg calcium  Yogurt                          1 cup           400 mg calcium   Hard cheese                     1.5 oz          300 mg  Cottage cheese                  2 cup           300 mg  Orange juice with Calcium       8 oz            300 mg  Low fat dairy sources are recommended      You should get 30 minutes of moderate weight bearing exercise on most days of the week .  Weight bearing exercise includes such things as walking, jogging, hiking, dancing.  You should also get Strength training 2 or more times/week in addition to other weight -being exercise. Strength training uses weight or resistance beyond that seen in everyday activities -(pilates, weight training with free weights, weight machines or resistance bands)      The pt was advised to    Maintain an adequate calcium and vitamin D intake and to supplement vitamin D if needed to maintain serum levels of 25 hydroxy D (25 OH D) between 30-60 ng/ml.    Limit alcohol intake to no more than 2 servings per day.    Limit caffeine intake.    Maintain an active lifestyle including weight-bearing exercises for at least 30 mins daily.    Take measures to reduce the risk of falling.            Follow-ups after your visit        Who to contact      "If you have questions or need follow up information about today's clinic visit or your schedule please contact [facility] directly at [phone].  Normal or non-critical lab and imaging results will be communicated to you by MyChart, letter or phone within 4 business days after the clinic has received the results. If you do not hear from us within 7 days, please contact the clinic through SharedReviewshart or phone. If you have a critical or abnormal lab result, we will notify you by phone as soon as possible.  Submit refill requests through Cycle or call your pharmacy and they will forward the refill request to us. Please allow 3 business days for your refill to be completed.          Additional Information About Your Visit        [email or web address] Information     Cycle gives you secure access to your electronic health record. If you see a primary care provider, you can also send messages to your care team and make appointments. If you have questions, please call your primary care clinic.  If you do not have a primary care provider, please call [phone] and they will assist you.        Care EveryWhere ID     This is your Care EveryWhere ID. This could be used by other organizations to access your [place] medical records  [id number]        Your Vitals Were     Pulse Temperature Respirations Height Last Period Pulse Oximetry    65 97.5  F (36.4  C) (Oral) 18 5' 5.25\" (1.657 m) (LMP Unknown) 97%    BMI (Body Mass Index)                   18.66 kg/m2            Blood Pressure from Last 3 Encounters:   11/21/18 104/58   10/25/18 128/78   09/26/18 150/72    Weight from Last 3 Encounters:   11/21/18 113 lb (51.3 kg)   10/25/18 109 lb 1.6 oz (49.5 kg)   09/26/18 112 lb 9.6 oz (51.1 kg)              Today, you had the following     No orders found for display       Primary Care Provider Office Phone # Fax #    Prisca Lucero [phone]       [address] [place] [address]        Equal " Access to Services     [facility]: Hadii aad ku hadtammyosman Kamranali, waaxda luqadaha, qaybta kaalmamars morrow, jose m jackman. So [facility] [phone].    ATENCIÓN: Si habla español, tiene a muir disposición servicios gratuitos de asistencia lingüística. Llame al [phone].    We comply with applicable federal civil rights laws and [place] laws. We do not discriminate on the basis of race, color, national origin, age, disability, sex, sexual orientation, or gender identity.            Thank you!     Thank you for choosing [facility]  for your care. Our goal is always to provide you with excellent care. Hearing back from our patients is one way we can continue to improve our services. Please take a few minutes to complete the written survey that you may receive in the mail after your visit with us. Thank you!             Your Updated Medication List - Protect others around you: Learn how to safely use, store and throw away your medicines at www.disposemymeds.org.          This list is accurate as of 11/21/18  1:05 PM.  Always use your most recent med list.                   Brand Name Dispense Instructions for use Diagnosis    aspirin 81 MG tablet      Take 81 mg by mouth daily        carvedilol 3.125 MG tablet    COREG    60 tablet    Take 1 tablet (3.125 mg) by mouth 2 times daily (with meals)        IRON SUPPLEMENT PO      Take 325 mg by mouth Every other day        LASIX 20 MG tablet   Generic drug:  furosemide     60 tablet    Take 20 mg by mouth daily    Heart failure (H), Hypertension goal BP (blood pressure) < 140/90       LIPITOR 20 MG tablet   Generic drug:  atorvastatin     90 tablet    Take 1 tablet (20 mg) by mouth daily        losartan 25 MG tablet    COZAAR     Take 25 mg by mouth daily        nitroGLYcerin 0.4 MG sublingual tablet    NITROSTAT    25 tablet    Place 1 tablet under the tongue every 5 minutes as needed for chest pain.    CAD (coronary artery  disease)       pantoprazole 40 MG EC tablet    PROTONIX    90 tablet    Take 1 tablet (40 mg) by mouth daily    History of ulcer disease       warfarin 1 MG tablet    COUMADIN    30 tablet    As per INR clinic at [facility]    Anticoagulated on Coumadin

## 2018-11-21 NOTE — LETTER
11/21/2018         RE: Devyn Link  [address] [address]        Dear Colleague,    Thank you for referring your patient, Devyn Link, to the [facility]. Please see a copy of my visit note below.    Name: Devyn Link  Seen for f/u of hyperPTH. Seen with .  HPI:  Devyn Link is a 72 year old female who presents for the evaluation of hyperPTH.  High calcium has been noted since 2007 on and off.  Other past medical history include CAD, history of aortic valve replacement, Hypertension, mitral valve replacement, pacemaker in place, prediabetes, osteoporosis without current pathological fracture.  Was on Amiodarone in past and now off X 6 months. Was on it for about 2-3 years.  DEXA 2017 consistent with osteoporosis.  She is not on medication at this time.    Repeat labs showed high  Ca, high PTH, normal creatinine, 24 hr urine calcium.  Parathyroid Scan did not identify adenoma.    History of Cancer:No  Thiazide Diuretic:No  Lithium use:No  Family History of pituitary adenoma, pancreas tumors, Zollinger-Diaz syndrome, pheochromocytoma. No  Kidney stones:No  Fractures: No  Abdominal Pain:Yes (Please explain): sometimes. H/o peptic ulcer. Is followed up by GI.  Cramps: No  Constipation: sometimes  Muscle Aches or pains: No  Muscle twitches: No  Nausea and vomiting: sometimes  Confusion Lethargy and fatigue: better now  ON medications like Lithium/ HCTZ: no  Average Daily Calcium intake: dairy- not much  Ca and Vit D supplementation: not taking    PMH/PSH:  Past Medical History:   Diagnosis Date     CAD (coronary artery disease) 12/13/2011    Followed every 6 months by Dr. Torre, [facility]      Chronic atrial fibrillation (H) 4/11/2013     Hyperlipidemia LDL goal <70 12/13/2011     Hypertension      Hypertension goal BP (blood pressure) < 140/90 12/14/2011     Myocardial infarction (H)      Pacemaker      Past Surgical History:   Procedure Laterality Date     C REPLACEMENT  "OF MITRAL VALVE  1994    Done at [facility]     COLONOSCOPY  06/13/2018    Dr. Alvarez [facility]     COLONOSCOPY N/A 6/13/2018    Procedure: COLONOSCOPY;;  Surgeon: Rosario Alvarez MD;  Location:  GI     ESOPHAGOSCOPY, GASTROSCOPY, DUODENOSCOPY (EGD), COMBINED  06/13/2018    Dr. Alvarez [facility]     ESOPHAGOSCOPY, GASTROSCOPY, DUODENOSCOPY (EGD), COMBINED N/A 6/13/2018    Procedure: COMBINED ESOPHAGOSCOPY, GASTROSCOPY, DUODENOSCOPY (EGD), BIOPSY SINGLE OR MULTIPLE;  COMBINED ESOPHAGOSCOPY, GASTROSCOPY, DUODENOSCOPY (EGD) with biopsies COLONOSCOPY   ;  Surgeon: Rosario Alvarez MD;  Location:  GI     HEART CATH DRUG ELUTING STENT PLACEMENT  2004/2007    seeing Cardiologist at [facility]     SURGICAL HISTORY OF -   ~ 1995    mechanical aortic valve     SURGICAL HISTORY OF -       pacemaker     SURGICAL HISTORY OF -       mechanical mitral valve     Family Hx:  Family History   Problem Relation Age of Onset     Hypertension Mother      Cancer Mother      lung     HEART DISEASE Mother      Cerebrovascular Disease Mother      Hypertension Sister      Colon Cancer No family hx of             Social Hx:  Social History     Social History     Marital status:      Spouse name: N/A     Number of children: N/A     Years of education: N/A     Occupational History     Not on file.     Social History Main Topics     Smoking status: Never Smoker     Smokeless tobacco: Never Used     Alcohol use No     Drug use: No     Sexual activity: Yes     Partners: Male     Other Topics Concern     Parent/Sibling W/ Cabg, Mi Or Angioplasty Before 65f 55m? Yes     Social History Narrative          MEDICATIONS:  has a current medication list which includes the following prescription(s): aspirin, atorvastatin, carvedilol, ferrous sulfate, furosemide, losartan, nitroglycerin, pantoprazole, and warfarin.    ROS     ROS: 10 point ROS neg other than the symptoms noted above in the HPI.    Physical Exam   VS: Ht 1.657 m (5' 5.25\")  LMP " " (LMP Unknown)  BMI 18.02 kg/m2   Ht 1.657 m (5' 5.25\")  LMP  (LMP Unknown)  BMI 18.02 kg/m2  GENERAL: AXOX3, NAD, well dressed, answering questions appropriately, appears stated age.  HEENT: No exopthalmous, no proptosis, EOMI, no lig lag, no retraction  NECK: Thyroid normal in size, non tender, no nodules were palpated.  CV: RRR  LUNGS: CTAB  ABDOMEN: +BS  NEUROLOGY: CN grossly intact, no tremors  PSYCH: normal affect and mood      LABS:  Calcium:  ENDO CALCIUM LABS-UMP Latest Ref Rng & Units 10/25/2018 9/27/2018   CALCIUM 8.5 - 10.1 mg/dL 10.9 (H)      ENDO CALCIUM LABS-UMP Latest Ref Rng & Units 9/26/2018   CALCIUM 8.5 - 10.1 mg/dL 10.1     ENDO CALCIUM LABS-UMP Latest Ref Rng & Units 9/10/2018 8/17/2018   CALCIUM 8.5 - 10.1 mg/dL 11.0 (H) 9.8     ENDO CALCIUM LABS-UMP Latest Ref Rng & Units 6/22/2018 6/14/2018   CALCIUM 8.5 - 10.1 mg/dL 10.2 (H)      ENDO CALCIUM LABS-UMP Latest Ref Rng & Units 6/5/2018   CALCIUM 8.5 - 10.1 mg/dL 10.6 (H)       PTH:  ENDO CALCIUM LABS-UMP Latest Ref Rng & Units 9/26/2018   PARATHYROID HORMONE INTACT 18 - 80 pg/mL 147 (H)     ENDO CALCIUM LABS-UMP Latest Ref Rng & Units 6/22/2018   PARATHYROID HORMONE INTACT 18 - 80 pg/mL 97 (H)     Vitamin D:  Lab Results   Component Value Date    VITDT 37 09/26/2018    VITDT 27 06/22/2018       TFTs:  Lab Results   Component Value Date    TSH 0.87 04/02/2018     ENDO CALCIUM LABS-UMP Latest Ref Rng & Units 9/27/2018   CALCIUM URINE G/24 H 0.10 - 0.30 g/24 h 0.17   CALCIUM URINE G/G CR g/g Cr 0.30   CALCIUM URINE MG/DL mg/dL 9.3     DEXA 10/2017:  REFERENCE T-SCORES:       Normal                -1.0 and greater                                 Osteopenia         Between -1.0 and -2.5                                           Osteoporosis     -2.5 and less                                       RISK FACTORS:  Post-menopausal, Height loss of 1.75 inches, Follow-up osteopenia, Low body weight     CURRENT TREATMENT:  Calcium, Vitamin " D      FINDINGS:               Lumbar Spine (L1-L2; due to significant degenerative changes at L3 and L4, these were omitted)      T-score:  -3.3               Left Femoral Neck                      T-score:  -2.9               Right Femoral Neck                    T-score:  -3.0                             Lumbar (L1-L4) BMD: 0.876                                                           Total Hip Mean BMD: 0.663                    LATERAL VERTEBRAL ASSESSMENT  Procedure:  Vertebral fracture assessment was performed in the lateral decubitus position using a [employer]  densitometer.  Indications for VFA: T-score of -1.0 or worse and age (female>69)  Confounding factors for VFA: Arthritis/degenerative disc disease, rib shadows.  The LVA scan is interpretable from T7 to L4.     VFA Findings: Using the semi-quantitative analysis of [employer] there was evidence of no spinal deformity  VFA Impression: Devyn Link has no vertebral fractures identified on the VFA.          IMPRESSION  Osteoporosis  Degenerative changes of the spine  Recommendations include ensuring adequate daily Calcium and Vitamin D intake  Follow up scan can be considered in three years.     Patient had a study performed previously, however the prior images are not available to compare to the current study.     Current NOF guidelines recommend treatment for patients with the following:  - Prior hip or vertebral fracture  - T-score -2.5 or below  - A 10 year risk of any major osteoporotic fracture >20% or 10 year risk of hip fracture >3%, as calculated using the FRAX calculator (www.shef.ac.uk/FRAX).       Based on these guidelines, treatment (in addition to calcium and vitamin D) is recommended for this patient, after ruling out other causes of osteoporosis/low bone density.  While this is meant as an aid to clinical decision-making, clinical judgment must still be used.     NM PARATHYROID PLANAR W/SPECT AND CT DUAL   11/20/2018 4:19 PM      TECHNIQUE:   766uci I123 PO; 24.6mCi Tc99m Sestamibi injected in the  right hand @1442.     HISTORY:  Hypercalcemia. Hyperparathyroidism.     COMPARISON:   Nuclear Study: None.  Other Relevant Studies: None.     FINDINGS:  Planar subtraction images show a normal appearance of the  thyroid gland with no focal lesion to indicate a parathyroid adenoma.  Likewise, the fused SPECT-CT images show no focal lesion or activity  to suggest a parathyroid adenoma. Noncontrast CT scan shows no  evidence of a parathyroid adenoma. 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         IMPRESSION: No nuclear or CT evidence of a parathyroid adenoma.  All pertinent notes, labs, and images personally reviewed by me.     A/P  Ms.Xiang Link is a 72 year old here for the evaluation of:    1. Hyperparathyroidism:  In the differential diagnosis of hypercalcemia, primary hyperparathyroidism is the most common cause. It is important to distinguish readily between primary hyperparathyroidism and other causes of hypercalcemia.    High calcium levels noted on and off since 2007.  More consistently high calcium level since 2017  Slightly elevated parathyroid hormone  Kidney function is in normal range  Vitamin D in normal range  Not on thaizide diuretic or lithium.  History of osteoporosis based on DEXA scan done 2017.  She is not on medication at this time  History of peptic ulcer.  Followed by GI. Gastrin slightly high- in the setting of PPI use.  Plan:  Discussed diagnosis, pathophysiology, management and treatment options of condition with pt.  Discussed close monitoring of labs at periodic interval vs neck exploration for parathyroid adenoma.  At this time she would like to continue to monitor.  Plan to repeat labs in 4-6 months  Please make a lab appointment for blood  work and follow up clinic appointment in 1 week after that to discuss results.    Discussed s/s of hypercalcemia and recommend adequate hydration.           2.  Osteoporosis:  DEXA 2017 c/w osteoporosis  Not on medication at this time  Labs work as noted above  Plan:  Discussed diagnosis, pathophysiology, management and treatment options of condition with pt.  Start Fosamax 70 mg ONCE a week (11/21/2018)  The pt was advised to    Maintain an adequate calcium and vitamin D intake and to supplement vitamin D if needed to maintain serum levels of 25 hydroxy D (25 OH D) between 30-60 ng/ml.    Limit alcohol intake to no more than 2 servings per day.    Limit caffeine intake.    Maintain an active lifestyle including weight-bearing exercises for at least 30 mins daily.    Take measures to reduce the risk of falling.  Discussed indications, risks and benefits of all medications prescribed, and answered questions to patient's satisfaction.  Instructions on Fosamax use and side effects - particularly esophageal adverse events - are carefully reviewed with her. This drug must be taken upon arising for the day on an empty stomach, with a large 6-8 ounce glass of water; she must remain NPO in the upright position for at least 30 minutes afterwards and until after the first food of the day. If esophageal irritation is noted, she will stop the drug and call my office.  Treatment with bisphosphonate therapy will decrease fracture risk 50-70%.   There is risk of osteonecrosis of the jaw in patients using bisphosphonates is approximately 1/1700-1/100,000, with development most likely related to invasive dental procedures. If an invasive dental procedure was necessary, preferably stop the bisphosphonate approximately 3 months prior to reduce the risk. Let your dentist know that you are on this medication.  The data available at this time suggests that there is probably a small increase risk of atypical (nontraumatic) subtrochanteric  fractures of the femur in patients on bisphosphonate therapy compared to those not on it. One large study suggested that for every 100 fractures prevented with bisphosphonate therapy, less than one femur fracture will occur. Other studies suggest one episode per 2,500 patient years. Patient should call with leg pain.      The patient indicates understanding of these issues and agrees with the plan.      Guidelines for parathyroid surgery in asymptomatic primary hyperparathyroidism:    Serum Calcium >1.0 mg/dl (0.25 mmol/L) above normal   Creatinine Clearance (calculated) Below 60m1/min /1.73 m2)   Bone Mineral Density T score < -2.5 SD at spine, hip (total or femoral neck) or radius (distal 1/3 site) or presence of fragility fracture   Age Age < 50 years        PARATHYROID GLAND IMAGING  Pre- operative imaging is of value prior to surgery in the localization of abnormal parathyroid tissue . The diagnosis of PHPT is based on the biochemical findings and is not affected by the results of the imaging studies. Sestamibi imaging is of value in localizing abnormal parathyroid tissue. The sensitivity and specificity varies among institutions.    More than 50% of the time spent with Ms. Link on counseling / coordinating her care. 45 min.    Follow-up:  After above.    Keisha Castro MD  Endocrinology   [facility] [place]  September 26, 2018  CC: Prisca Lucero      Addendum to above note and clinic visit:    Labs reviewed.    See result note/telephone encounter.            Again, thank you for allowing me to participate in the care of your patient.        Sincerely,        Keisha Castro MD

## 2018-11-21 NOTE — PROGRESS NOTES
Name: Devyn Link  Seen for f/u of hyperPTH. Seen with .  HPI:  Devyn Link is a 72 year old female who presents for the evaluation of hyperPTH.  High calcium has been noted since 2007 on and off.  Other past medical history include CAD, history of aortic valve replacement, Hypertension, mitral valve replacement, pacemaker in place, prediabetes, osteoporosis without current pathological fracture.  Was on Amiodarone in past and now off X 6 months. Was on it for about 2-3 years.  DEXA 2017 consistent with osteoporosis.  She is not on medication at this time.    Repeat labs showed high  Ca, high PTH, normal creatinine, 24 hr urine calcium.  Parathyroid Scan did not identify adenoma.    History of Cancer:No  Thiazide Diuretic:No  Lithium use:No  Family History of pituitary adenoma, pancreas tumors, Zollinger-Diaz syndrome, pheochromocytoma. No  Kidney stones:No  Fractures: No  Abdominal Pain:Yes (Please explain): sometimes. H/o peptic ulcer. Is followed up by GI.  Cramps: No  Constipation: sometimes  Muscle Aches or pains: No  Muscle twitches: No  Nausea and vomiting: sometimes  Confusion Lethargy and fatigue: better now  ON medications like Lithium/ HCTZ: no  Average Daily Calcium intake: dairy- not much  Ca and Vit D supplementation: not taking    PMH/PSH:  Past Medical History:   Diagnosis Date     CAD (coronary artery disease) 12/13/2011    Followed every 6 months by Dr. Torre, [facility]      Chronic atrial fibrillation (H) 4/11/2013     Hyperlipidemia LDL goal <70 12/13/2011     Hypertension      Hypertension goal BP (blood pressure) < 140/90 12/14/2011     Myocardial infarction (H)      Pacemaker      Past Surgical History:   Procedure Laterality Date     C REPLACEMENT OF MITRAL VALVE  1994    Done at [facility]     COLONOSCOPY  06/13/2018    Dr. Alvarez [facility]     COLONOSCOPY N/A 6/13/2018    Procedure: COLONOSCOPY;;  Surgeon: Rosario Alvarez MD;  Location:  GI     ESOPHAGOSCOPY, GASTROSCOPY,  "DUODENOSCOPY (EGD), COMBINED  06/13/2018    Dr. Alvarez [facility]     ESOPHAGOSCOPY, GASTROSCOPY, DUODENOSCOPY (EGD), COMBINED N/A 6/13/2018    Procedure: COMBINED ESOPHAGOSCOPY, GASTROSCOPY, DUODENOSCOPY (EGD), BIOPSY SINGLE OR MULTIPLE;  COMBINED ESOPHAGOSCOPY, GASTROSCOPY, DUODENOSCOPY (EGD) with biopsies COLONOSCOPY   ;  Surgeon: Rosario Alvarez MD;  Location:  GI     HEART CATH DRUG ELUTING STENT PLACEMENT  2004/2007    seeing Cardiologist at [facility]     SURGICAL HISTORY OF -   ~ 1995    mechanical aortic valve     SURGICAL HISTORY OF -       pacemaker     SURGICAL HISTORY OF -       mechanical mitral valve     Family Hx:  Family History   Problem Relation Age of Onset     Hypertension Mother      Cancer Mother      lung     HEART DISEASE Mother      Cerebrovascular Disease Mother      Hypertension Sister      Colon Cancer No family hx of             Social Hx:  Social History     Social History     Marital status:      Spouse name: N/A     Number of children: N/A     Years of education: N/A     Occupational History     Not on file.     Social History Main Topics     Smoking status: Never Smoker     Smokeless tobacco: Never Used     Alcohol use No     Drug use: No     Sexual activity: Yes     Partners: Male     Other Topics Concern     Parent/Sibling W/ Cabg, Mi Or Angioplasty Before 65f 55m? Yes     Social History Narrative          MEDICATIONS:  has a current medication list which includes the following prescription(s): aspirin, atorvastatin, carvedilol, ferrous sulfate, furosemide, losartan, nitroglycerin, pantoprazole, and warfarin.    ROS     ROS: 10 point ROS neg other than the symptoms noted above in the HPI.    Physical Exam   VS: Ht 1.657 m (5' 5.25\")  LMP  (LMP Unknown)  BMI 18.02 kg/m2   Ht 1.657 m (5' 5.25\")  LMP  (LMP Unknown)  BMI 18.02 kg/m2  GENERAL: AXOX3, NAD, well dressed, answering questions appropriately, appears stated age.  HEENT: No exopthalmous, no proptosis, EOMI, no lig " lag, no retraction  NECK: Thyroid normal in size, non tender, no nodules were palpated.  CV: RRR  LUNGS: CTAB  ABDOMEN: +BS  NEUROLOGY: CN grossly intact, no tremors  PSYCH: normal affect and mood      LABS:  Calcium:  ENDO CALCIUM LABS-UMP Latest Ref Rng & Units 10/25/2018 9/27/2018   CALCIUM 8.5 - 10.1 mg/dL 10.9 (H)      ENDO CALCIUM LABS-UMP Latest Ref Rng & Units 9/26/2018   CALCIUM 8.5 - 10.1 mg/dL 10.1     ENDO CALCIUM LABS-UMP Latest Ref Rng & Units 9/10/2018 8/17/2018   CALCIUM 8.5 - 10.1 mg/dL 11.0 (H) 9.8     ENDO CALCIUM LABS-UMP Latest Ref Rng & Units 6/22/2018 6/14/2018   CALCIUM 8.5 - 10.1 mg/dL 10.2 (H)      ENDO CALCIUM LABS-UMP Latest Ref Rng & Units 6/5/2018   CALCIUM 8.5 - 10.1 mg/dL 10.6 (H)       PTH:  ENDO CALCIUM LABS-UMP Latest Ref Rng & Units 9/26/2018   PARATHYROID HORMONE INTACT 18 - 80 pg/mL 147 (H)     ENDO CALCIUM LABS-UMP Latest Ref Rng & Units 6/22/2018   PARATHYROID HORMONE INTACT 18 - 80 pg/mL 97 (H)     Vitamin D:  Lab Results   Component Value Date    VITDT 37 09/26/2018    VITDT 27 06/22/2018       TFTs:  Lab Results   Component Value Date    TSH 0.87 04/02/2018     ENDO CALCIUM LABS-UMP Latest Ref Rng & Units 9/27/2018   CALCIUM URINE G/24 H 0.10 - 0.30 g/24 h 0.17   CALCIUM URINE G/G CR g/g Cr 0.30   CALCIUM URINE MG/DL mg/dL 9.3     DEXA 10/2017:  REFERENCE T-SCORES:       Normal                -1.0 and greater                                 Osteopenia         Between -1.0 and -2.5                                           Osteoporosis     -2.5 and less                                       RISK FACTORS:  Post-menopausal, Height loss of 1.75 inches, Follow-up osteopenia, Low body weight     CURRENT TREATMENT:  Calcium, Vitamin D      FINDINGS:               Lumbar Spine (L1-L2; due to significant degenerative changes at L3 and L4, these were omitted)      T-score:  -3.3               Left Femoral Neck                      T-score:  -2.9               Right Femoral Neck                     T-score:  -3.0                             Lumbar (L1-L4) BMD: 0.876                                                           Total Hip Mean BMD: 0.663                    LATERAL VERTEBRAL ASSESSMENT  Procedure:  Vertebral fracture assessment was performed in the lateral decubitus position using a Lunar Prodigy  densitometer.  Indications for VFA: T-score of -1.0 or worse and age (female>69)  Confounding factors for VFA: Arthritis/degenerative disc disease, rib shadows.  The LVA scan is interpretable from T7 to L4.     VFA Findings: Using the semi-quantitative analysis of [employer] there was evidence of no spinal deformity  VFA Impression: Devyn Link has no vertebral fractures identified on the VFA.          IMPRESSION  Osteoporosis  Degenerative changes of the spine  Recommendations include ensuring adequate daily Calcium and Vitamin D intake  Follow up scan can be considered in three years.     Patient had a study performed previously, however the prior images are not available to compare to the current study.     Current NOF guidelines recommend treatment for patients with the following:  - Prior hip or vertebral fracture  - T-score -2.5 or below  - A 10 year risk of any major osteoporotic fracture >20% or 10 year risk of hip fracture >3%, as calculated using the FRAX calculator (www.shef.ac.uk/FRAX).       Based on these guidelines, treatment (in addition to calcium and vitamin D) is recommended for this patient, after ruling out other causes of osteoporosis/low bone density.  While this is meant as an aid to clinical decision-making, clinical judgment must still be used.     NM PARATHYROID PLANAR W/SPECT AND CT DUAL   11/20/2018 4:19 PM      TECHNIQUE:  766uci I123 PO; 24.6mCi Tc99m Sestamibi injected in the  right hand @1442.     HISTORY:  Hypercalcemia. Hyperparathyroidism.     COMPARISON:   Nuclear Study: None.  Other Relevant Studies: None.     FINDINGS:  Planar subtraction images show a  normal appearance of the  thyroid gland with no focal lesion to indicate a parathyroid adenoma.  Likewise, the fused SPECT-CT images show no focal lesion or activity  to suggest a parathyroid adenoma. Noncontrast CT scan shows no  evidence of a parathyroid adenoma. 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         IMPRESSION: No nuclear or CT evidence of a parathyroid adenoma.  All pertinent notes, labs, and images personally reviewed by me.     A/P  Ms.Xiang Link is a 72 year old here for the evaluation of:    1. Hyperparathyroidism:  In the differential diagnosis of hypercalcemia, primary hyperparathyroidism is the most common cause. It is important to distinguish readily between primary hyperparathyroidism and other causes of hypercalcemia.    High calcium levels noted on and off since 2007.  More consistently high calcium level since 2017  Slightly elevated parathyroid hormone  Kidney function is in normal range  Vitamin D in normal range  Not on thaizide diuretic or lithium.  History of osteoporosis based on DEXA scan done 2017.  She is not on medication at this time  History of peptic ulcer.  Followed by GI. Gastrin slightly high- in the setting of PPI use.  Plan:  Discussed diagnosis, pathophysiology, management and treatment options of condition with pt.  Discussed close monitoring of labs at periodic interval vs neck exploration for parathyroid adenoma.  At this time she would like to continue to monitor.  Plan to repeat labs in 4-6 months  Please make a lab appointment for blood work and follow up clinic appointment in 1 week after that to discuss results.    Discussed s/s of hypercalcemia and recommend adequate hydration.           2.  Osteoporosis:  DEXA 2017 c/w osteoporosis  Not on medication at this time  Labs work  as noted above  Plan:  Discussed diagnosis, pathophysiology, management and treatment options of condition with pt.  Start Fosamax 70 mg ONCE a week (11/21/2018)  The pt was advised to    Maintain an adequate calcium and vitamin D intake and to supplement vitamin D if needed to maintain serum levels of 25 hydroxy D (25 OH D) between 30-60 ng/ml.    Limit alcohol intake to no more than 2 servings per day.    Limit caffeine intake.    Maintain an active lifestyle including weight-bearing exercises for at least 30 mins daily.    Take measures to reduce the risk of falling.  Discussed indications, risks and benefits of all medications prescribed, and answered questions to patient's satisfaction.  Instructions on Fosamax use and side effects - particularly esophageal adverse events - are carefully reviewed with her. This drug must be taken upon arising for the day on an empty stomach, with a large 6-8 ounce glass of water; she must remain NPO in the upright position for at least 30 minutes afterwards and until after the first food of the day. If esophageal irritation is noted, she will stop the drug and call my office.  Treatment with bisphosphonate therapy will decrease fracture risk 50-70%.   There is risk of osteonecrosis of the jaw in patients using bisphosphonates is approximately 1/1700-1/100,000, with development most likely related to invasive dental procedures. If an invasive dental procedure was necessary, preferably stop the bisphosphonate approximately 3 months prior to reduce the risk. Let your dentist know that you are on this medication.  The data available at this time suggests that there is probably a small increase risk of atypical (nontraumatic) subtrochanteric fractures of the femur in patients on bisphosphonate therapy compared to those not on it. One large study suggested that for every 100 fractures prevented with bisphosphonate therapy, less than one femur fracture will occur. Other studies  suggest one episode per 2,500 patient years. Patient should call with leg pain.      The patient indicates understanding of these issues and agrees with the plan.      Guidelines for parathyroid surgery in asymptomatic primary hyperparathyroidism:    Serum Calcium >1.0 mg/dl (0.25 mmol/L) above normal   Creatinine Clearance (calculated) Below 60m1/min /1.73 m2)   Bone Mineral Density T score < -2.5 SD at spine, hip (total or femoral neck) or radius (distal 1/3 site) or presence of fragility fracture   Age Age < 50 years        PARATHYROID GLAND IMAGING  Pre- operative imaging is of value prior to surgery in the localization of abnormal parathyroid tissue . The diagnosis of PHPT is based on the biochemical findings and is not affected by the results of the imaging studies. Sestamibi imaging is of value in localizing abnormal parathyroid tissue. The sensitivity and specificity varies among institutions.    More than 50% of the time spent with Ms. Link on counseling / coordinating her care. Spent 45 min.    Follow-up:  After above.    Keisha Castro MD  Endocrinology   [facility]  September 26, 2018  CC: Prisca Lucero      Addendum to above note and clinic visit:    Labs reviewed.    See result note/telephone encounter.

## 2018-11-21 NOTE — PATIENT INSTRUCTIONS
[facility]   Dr Castro, Endocrinology Department      [facility]   [address] [address]  Appointment [phone]  Fax: [phone]   Monday and Tuesday         [facility]   [address] [place] [address]  Appointment [phone]  Fax: [phone]  Wednesday and Thursday           Plan to continue to monitor  Labs in 4-6 months  Please make a lab appointment for blood work and follow up clinic appointment in 1 week after that to discuss results.  Start Fosamax 70 mg ONCE a week.    Instructions on Fosamax use and side effects - particularly esophageal adverse events - are carefully reviewed with her. This drug must be taken upon arising for the day on an empty stomach, with a large 6-8 ounce glass of water; she must remain NPO in the upright position for at least 30 minutes afterwards and until after the first food of the day. If esophageal irritation is noted, she will stop the drug and call my office.  Treatment with bisphosphonate therapy will decrease fracture risk 50-70%.   There is risk of osteonecrosis of the jaw in patients using bisphosphonates is approximately 1700-1/100,000, with development most likely related to invasive dental procedures. If an invasive dental procedure was necessary, preferably stop the bisphosphonate approximately 3 months prior to reduce the risk. Let your dentist know that you are on this medication.  The data available at this time suggests that there is probably a small increase risk of atypical (nontraumatic) subtrochanteric fractures of the femur in patients on bisphosphonate therapy compared to those not on it. One large study suggested that for every 100 fractures prevented with bisphosphonate therapy, less than one femur fracture will occur. Other studies suggest one episode per 2,500 patient years. Patient should call with leg  pain.       You should get 1200 mg/day calcium in divided doses of no more than 500 mg/dose.  This INCLUDES what is in your food as well as what is in any supplements you may be taking.    Vit D about 1000 IU/day ( unless you have vit D deficiency- in that case higher dose)  Dietary sources of calcium:: These also contain vitamin D  Milk                            8 oz            300 mg calcium  Yogurt                          1 cup           400 mg calcium   Hard cheese                     1.5 oz          300 mg  Cottage cheese                  2 cup           300 mg  Orange juice with Calcium       8 oz            300 mg  Low fat dairy sources are recommended      You should get 30 minutes of moderate weight bearing exercise on most days of the week .  Weight bearing exercise includes such things as walking, jogging, hiking, dancing.  You should also get Strength training 2 or more times/week in addition to other weight -being exercise. Strength training uses weight or resistance beyond that seen in everyday activities -(pilates, weight training with free weights, weight machines or resistance bands)      The pt was advised to    Maintain an adequate calcium and vitamin D intake and to supplement vitamin D if needed to maintain serum levels of 25 hydroxy D (25 OH D) between 30-60 ng/ml.    Limit alcohol intake to no more than 2 servings per day.    Limit caffeine intake.    Maintain an active lifestyle including weight-bearing exercises for at least 30 mins daily.    Take measures to reduce the risk of falling.

## 2019-03-05 DIAGNOSIS — Z87.898 HISTORY OF ULCER DISEASE: ICD-10-CM

## 2019-03-05 NOTE — TELEPHONE ENCOUNTER
"Requested Prescriptions   Pending Prescriptions Disp Refills     pantoprazole (PROTONIX) 40 MG EC tablet [Pharmacy Med Name: PANTOPRAZOLE SODIUM 40 MG Tablet Delayed Release]  Last Written Prescription Date:  9/10/18  Last Fill Quantity: 90,  # refills: 1   Last office visit: 10/25/2018 with prescribing provider:  Prisca Lucero MD   Future Office Visit:   Next 5 appointments (look out 90 days)    Mar 27, 2019 11:00 AM CDT  [facility] [address] [facility] [address]  [facility] [address] [phone]          90 tablet 1     Sig: TAKE 1 TABLET EVERY DAY    PPI Protocol Failed - 3/5/2019 10:30 AM       Failed - No diagnosis of osteoporosis on record       Passed - Not on Clopidogrel (unless Pantoprazole ordered)       Passed - Recent (12 mo) or future (30 days) visit within the authorizing provider's specialty    Patient had office visit in the last 12 months or has a visit in the next 30 days with authorizing provider or within the authorizing provider's specialty.  See \"Patient Info\" tab in inbasket, or \"Choose Columns\" in Meds & Orders section of the refill encounter.             Passed - Medication is active on med list       Passed - Patient is age 18 or older       Passed - No active pregnacy on record       Passed - No positive pregnancy test in past 12 months          "

## 2019-03-07 RX ORDER — PANTOPRAZOLE SODIUM 40 MG/1
TABLET, DELAYED RELEASE ORAL
Qty: 90 TABLET | Refills: 1 | Status: SHIPPED | OUTPATIENT
Start: 2019-03-07 | End: 2019-07-05

## 2019-03-13 DIAGNOSIS — E83.52 HYPERCALCEMIA: ICD-10-CM

## 2019-03-13 DIAGNOSIS — E21.3 HYPERPARATHYROIDISM (H): ICD-10-CM

## 2019-03-13 LAB
CA-I SERPL ISE-MCNC: 5.3 MG/DL (ref 4.4–5.2)
PTH-INTACT SERPL-MCNC: 142 PG/ML (ref 18–80)

## 2019-03-13 PROCEDURE — 82565 ASSAY OF CREATININE: CPT | Performed by: INTERNAL MEDICINE

## 2019-03-13 PROCEDURE — 84100 ASSAY OF PHOSPHORUS: CPT | Performed by: INTERNAL MEDICINE

## 2019-03-13 PROCEDURE — 82330 ASSAY OF CALCIUM: CPT | Performed by: INTERNAL MEDICINE

## 2019-03-13 PROCEDURE — 36415 COLL VENOUS BLD VENIPUNCTURE: CPT | Performed by: INTERNAL MEDICINE

## 2019-03-13 PROCEDURE — 83970 ASSAY OF PARATHORMONE: CPT | Performed by: INTERNAL MEDICINE

## 2019-03-13 PROCEDURE — 83735 ASSAY OF MAGNESIUM: CPT | Performed by: INTERNAL MEDICINE

## 2019-03-13 PROCEDURE — 82306 VITAMIN D 25 HYDROXY: CPT | Performed by: INTERNAL MEDICINE

## 2019-03-13 PROCEDURE — 82310 ASSAY OF CALCIUM: CPT | Performed by: INTERNAL MEDICINE

## 2019-03-14 LAB
CALCIUM SERPL-MCNC: 10.5 MG/DL (ref 8.5–10.1)
CREAT SERPL-MCNC: 0.64 MG/DL (ref 0.52–1.04)
DEPRECATED CALCIDIOL+CALCIFEROL SERPL-MC: 28 UG/L (ref 20–75)
GFR SERPL CREATININE-BSD FRML MDRD: 87 ML/MIN/{1.73_M2}
MAGNESIUM SERPL-MCNC: 2.2 MG/DL (ref 1.6–2.3)
PHOSPHATE SERPL-MCNC: 2.6 MG/DL (ref 2.5–4.5)

## 2019-03-27 ENCOUNTER — OFFICE VISIT (OUTPATIENT)
Dept: ENDOCRINOLOGY | Facility: CLINIC | Age: 74
End: 2019-03-27
Payer: COMMERCIAL

## 2019-03-27 VITALS
OXYGEN SATURATION: 98 % | SYSTOLIC BLOOD PRESSURE: 100 MMHG | HEIGHT: 66 IN | RESPIRATION RATE: 18 BRPM | DIASTOLIC BLOOD PRESSURE: 66 MMHG | BODY MASS INDEX: 18.23 KG/M2 | WEIGHT: 113.4 LBS | HEART RATE: 74 BPM | TEMPERATURE: 98.2 F

## 2019-03-27 DIAGNOSIS — M81.8 OTHER OSTEOPOROSIS WITHOUT CURRENT PATHOLOGICAL FRACTURE: ICD-10-CM

## 2019-03-27 DIAGNOSIS — E83.52 HYPERCALCEMIA: ICD-10-CM

## 2019-03-27 DIAGNOSIS — E21.3 HYPERPARATHYROIDISM (H): Primary | ICD-10-CM

## 2019-03-27 PROCEDURE — 99214 OFFICE O/P EST MOD 30 MIN: CPT | Performed by: INTERNAL MEDICINE

## 2019-03-27 ASSESSMENT — MIFFLIN-ST. JEOR: SCORE: 1026.13

## 2019-03-27 NOTE — PROGRESS NOTES
Name: Devyn Link  Seen for f/u of hyperPTH. Seen with .  HPI:  Devyn Link is a 73 year old female who presents for the evaluation of hyperPTH.  High calcium has been noted since 2007 on and off.  Other past medical history include CAD, history of aortic valve replacement, Hypertension, mitral valve replacement, pacemaker in place, prediabetes, osteoporosis without current pathological fracture.  Was on Amiodarone in past and now off X 6 months. Was on it for about 2-3 years.  DEXA 2017 consistent with osteoporosis. on fosamax since 11/2018.    Repeat labs showed high  Ca, high PTH, normal creatinine, 24 hr urine calcium.  Parathyroid Scan did not identify adenoma.    Energy levels are up and down.    History of Cancer:No  Thiazide Diuretic:No  Lithium use:No  Family History of pituitary adenoma, pancreas tumors, Zollinger-Diaz syndrome, pheochromocytoma. No  Kidney stones:No  Fractures: No. DEXA c/w osteoporosis- on fosamax since 11/2018.  Abdominal Pain:Yes (Please explain): sometimes. H/o peptic ulcer. Is followed up by GI.  Cramps: No  Constipation: no  Muscle Aches or pains: No  Muscle twitches: No  Nausea and vomiting: no  Confusion Lethargy and fatigue: better now  ON medications like Lithium/ HCTZ: no  Average Daily Calcium intake: dairy- not much  Ca and Vit D supplementation: taking some but not sure about the dose.    PMH/PSH:  Past Medical History:   Diagnosis Date     CAD (coronary artery disease) 12/13/2011    Followed every 6 months by Dr. Torre, [facility]      Chronic atrial fibrillation (H) 4/11/2013     Hyperlipidemia LDL goal <70 12/13/2011     Hypertension      Hypertension goal BP (blood pressure) < 140/90 12/14/2011     Myocardial infarction (H)      Pacemaker      Past Surgical History:   Procedure Laterality Date     C REPLACEMENT OF MITRAL VALVE  1994    Done at [facility]     COLONOSCOPY  06/13/2018    Dr. Alvarez [facility]     COLONOSCOPY N/A 6/13/2018    Procedure: COLONOSCOPY;;   Surgeon: Rosario Alvarez MD;  Location:  GI     ESOPHAGOSCOPY, GASTROSCOPY, DUODENOSCOPY (EGD), COMBINED  06/13/2018    Dr. Alvarez [facility]     ESOPHAGOSCOPY, GASTROSCOPY, DUODENOSCOPY (EGD), COMBINED N/A 6/13/2018    Procedure: COMBINED ESOPHAGOSCOPY, GASTROSCOPY, DUODENOSCOPY (EGD), BIOPSY SINGLE OR MULTIPLE;  COMBINED ESOPHAGOSCOPY, GASTROSCOPY, DUODENOSCOPY (EGD) with biopsies COLONOSCOPY   ;  Surgeon: Rosario Alvarez MD;  Location:  GI     HEART CATH DRUG ELUTING STENT PLACEMENT  2004/2007    seeing Cardiologist at [facility]     SURGICAL HISTORY OF -   ~ 1995    mechanical aortic valve     SURGICAL HISTORY OF -       pacemaker     SURGICAL HISTORY OF -       mechanical mitral valve     Family Hx:  Family History   Problem Relation Age of Onset     Hypertension Mother      Cancer Mother         lung     Heart Disease Mother      Cerebrovascular Disease Mother      Hypertension Sister      Colon Cancer No family hx of             Social Hx:  Social History     Socioeconomic History     Marital status:      Spouse name: Not on file     Number of children: Not on file     Years of education: Not on file     Highest education level: Not on file   Occupational History     Not on file   Social Needs     Financial resource strain: Not on file     Food insecurity:     Worry: Not on file     Inability: Not on file     Transportation needs:     Medical: Not on file     Non-medical: Not on file   Tobacco Use     Smoking status: Never Smoker     Smokeless tobacco: Never Used   Substance and Sexual Activity     Alcohol use: No     Drug use: No     Sexual activity: Yes     Partners: Male   Lifestyle     Physical activity:     Days per week: Not on file     Minutes per session: Not on file     Stress: Not on file   Relationships     Social connections:     Talks on phone: Not on file     Gets together: Not on file     Attends [employer] service: Not on file     Active member of club or organization: Not on file  "    Attends meetings of clubs or organizations: Not on file     Relationship status: Not on file     Intimate partner violence:     Fear of current or ex partner: Not on file     Emotionally abused: Not on file     Physically abused: Not on file     Forced sexual activity: Not on file   Other Topics Concern     Parent/sibling w/ CABG, MI or angioplasty before 65F 55M? Yes   Social History Narrative     Not on file          MEDICATIONS:  has a current medication list which includes the following prescription(s): alendronate, aspirin, atorvastatin, carvedilol, ferrous sulfate, furosemide, losartan, nitroglycerin, pantoprazole, and warfarin.    ROS     ROS: 10 point ROS neg other than the symptoms noted above in the HPI.    Physical Exam   VS: /66 (BP Location: Right arm, Patient Position: Sitting, Cuff Size: Adult Regular)   Pulse 74   Temp 98.2  F (36.8  C) (Oral)   Resp 18   Ht 1.676 m (5' 6\")   Wt 51.4 kg (113 lb 6.4 oz)   LMP  (LMP Unknown)   SpO2 98%   BMI 18.30 kg/m     /66 (BP Location: Right arm, Patient Position: Sitting, Cuff Size: Adult Regular)   Pulse 74   Temp 98.2  F (36.8  C) (Oral)   Resp 18   Ht 1.676 m (5' 6\")   Wt 51.4 kg (113 lb 6.4 oz)   LMP  (LMP Unknown)   SpO2 98%   BMI 18.30 kg/m    GENERAL: AXOX3, NAD, well dressed, answering questions appropriately, appears stated age.  HEENT: No exopthalmous, no proptosis, EOMI, no lig lag, no retraction  NECK: Thyroid normal in size, non tender, no nodules were palpated.  CV: RRR  LUNGS: CTAB  ABDOMEN: +BS  NEUROLOGY: CN grossly intact, no tremors  PSYCH: normal affect and mood      LABS:  Calcium:  ENDO CALCIUM LABS-UMP Latest Ref Rng & Units 3/13/2019   CALCIUM 8.5 - 10.1 mg/dL 10.5 (H)   CALCIUM IONIZED 4.4 - 5.2 mg/dL 5.3 (H)     ENDO CALCIUM LABS-UMP Latest Ref Rng & Units 10/25/2018 9/27/2018   CALCIUM 8.5 - 10.1 mg/dL 10.9 (H)      ENDO CALCIUM LABS-UMP Latest Ref Rng & Units 9/26/2018   CALCIUM 8.5 - 10.1 mg/dL 10.1 "     ENDO CALCIUM LABS-UMP Latest Ref Rng & Units 9/10/2018 8/17/2018   CALCIUM 8.5 - 10.1 mg/dL 11.0 (H) 9.8     ENDO CALCIUM LABS-UMP Latest Ref Rng & Units 6/22/2018 6/14/2018   CALCIUM 8.5 - 10.1 mg/dL 10.2 (H)      ENDO CALCIUM LABS-UMP Latest Ref Rng & Units 6/5/2018   CALCIUM 8.5 - 10.1 mg/dL 10.6 (H)       PTH:  ENDO CALCIUM LABS-UMP Latest Ref Rng & Units 3/13/2019   PARATHYROID HORMONE INTACT 18 - 80 pg/mL 142 (H)     ENDO CALCIUM LABS-UMP Latest Ref Rng & Units 9/26/2018   PARATHYROID HORMONE INTACT 18 - 80 pg/mL 147 (H)     ENDO CALCIUM LABS-UMP Latest Ref Rng & Units 6/22/2018   PARATHYROID HORMONE INTACT 18 - 80 pg/mL 97 (H)     Vitamin D:  Lab Results   Component Value Date    VITDT 28 03/13/2019    VITDT 37 09/26/2018    VITDT 27 06/22/2018       TFTs:  Lab Results   Component Value Date    TSH 0.87 04/02/2018     ENDO CALCIUM LABS-UMP Latest Ref Rng & Units 9/27/2018   CALCIUM URINE G/24 H 0.10 - 0.30 g/24 h 0.17   CALCIUM URINE G/G CR g/g Cr 0.30   CALCIUM URINE MG/DL mg/dL 9.3     DEXA 10/2017:  REFERENCE T-SCORES:       Normal                -1.0 and greater                                 Osteopenia         Between -1.0 and -2.5                                           Osteoporosis     -2.5 and less                                       RISK FACTORS:  Post-menopausal, Height loss of 1.75 inches, Follow-up osteopenia, Low body weight     CURRENT TREATMENT:  Calcium, Vitamin D      FINDINGS:               Lumbar Spine (L1-L2; due to significant degenerative changes at L3 and L4, these were omitted)      T-score:  -3.3               Left Femoral Neck                      T-score:  -2.9               Right Femoral Neck                    T-score:  -3.0                             Lumbar (L1-L4) BMD: 0.876                                                           Total Hip Mean BMD: 0.663                    LATERAL VERTEBRAL ASSESSMENT  Procedure:  Vertebral fracture assessment was performed in the  lateral decubitus position using a Lunar [employer]dig[employer]  densitometer.  Indications for VFA: T-score of -1.0 or worse and age (female>69)  Confounding factors for VFA: Arthritis/degenerative disc disease, rib shadows.  The LVA scan is interpretable from T7 to L4.     VFA Findings: Using the semi-quantitative analysis of [employer] there was evidence of no spinal deformity  VFA Impression: Devyn Link has no vertebral fractures identified on the VFA.          IMPRESSION  Osteoporosis  Degenerative changes of the spine  Recommendations include ensuring adequate daily Calcium and Vitamin D intake  Follow up scan can be considered in three years.     Patient had a study performed previously, however the prior images are not available to compare to the current study.     Current NOF guidelines recommend treatment for patients with the following:  - Prior hip or vertebral fracture  - T-score -2.5 or below  - A 10 year risk of any major osteoporotic fracture >20% or 10 year risk of hip fracture >3%, as calculated using the FRAX calculator (www.shef.ac.uk/FRAX).       Based on these guidelines, treatment (in addition to calcium and vitamin D) is recommended for this patient, after ruling out other causes of osteoporosis/low bone density.  While this is meant as an aid to clinical decision-making, clinical judgment must still be used.     NM PARATHYROID PLANAR W/SPECT AND CT DUAL   11/20/2018 4:19 PM      TECHNIQUE:  766uci I123 PO; 24.6mCi Tc99m Sestamibi injected in the  right hand @1442.     HISTORY:  Hypercalcemia. Hyperparathyroidism.     COMPARISON:   Nuclear Study: None.  Other Relevant Studies: None.     FINDINGS:  Planar subtraction images show a normal appearance of the  thyroid gland with no focal lesion to indicate a parathyroid adenoma.  Likewise, the fused SPECT-CT images show no focal lesion or activity  to suggest a parathyroid adenoma. Noncontrast CT scan shows no  evidence of a parathyroid adenoma. 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         IMPRESSION: No nuclear or CT evidence of a parathyroid adenoma.  All pertinent notes, labs, and images personally reviewed by me.     A/P  Ms.Xiang Link is a 73 year old here for the evaluation of:    1. Hyperparathyroidism:  In the differential diagnosis of hypercalcemia, primary hyperparathyroidism is the most common cause. It is important to distinguish readily between primary hyperparathyroidism and other causes of hypercalcemia.    High calcium levels noted on and off since 2007.  More consistently high calcium level since 2017  Slightly elevated parathyroid hormone  Kidney function is in normal range  Vitamin D in normal range  Not on thaizide diuretic or lithium.  History of osteoporosis based on DEXA scan done 2017. On fosamax since 11/2018.  History of peptic ulcer.  Followed by GI. Gastrin slightly high- in the setting of PPI use.  Plan:  Discussed diagnosis, pathophysiology, management and treatment options of condition with pt.  Discussed close monitoring of labs at periodic interval vs neck exploration for parathyroid adenoma.  At this time she would like to continue to monitor.  Plan to repeat labs in 6 months  Please make a lab appointment for blood work and follow up clinic appointment in 1 week after that to discuss results.    Discussed s/s of hypercalcemia and recommend adequate hydration.           2.  Osteoporosis:  DEXA 2017 c/w osteoporosis  On fosamax since 11/2018.  Tolerating well.  Plan:  Discussed diagnosis, pathophysiology, management and treatment options of condition with pt.  Continue Fosamax 70 mg ONCE a week (started 11/21/2018)  The pt was advised to    Maintain an adequate calcium and vitamin D intake and to supplement vitamin D if needed to maintain serum  levels of 25 hydroxy D (25 OH D) between 30-60 ng/ml.    Limit alcohol intake to no more than 2 servings per day.    Limit caffeine intake.    Maintain an active lifestyle including weight-bearing exercises for at least 30 mins daily.    Take measures to reduce the risk of falling.  Discussed indications, risks and benefits of all medications prescribed, and answered questions to patient's satisfaction.  Instructions on Fosamax use and side effects - particularly esophageal adverse events - are carefully reviewed with her. This drug must be taken upon arising for the day on an empty stomach, with a large 6-8 ounce glass of water; she must remain NPO in the upright position for at least 30 minutes afterwards and until after the first food of the day. If esophageal irritation is noted, she will stop the drug and call my office.  Treatment with bisphosphonate therapy will decrease fracture risk 50-70%.   There is risk of osteonecrosis of the jaw in patients using bisphosphonates is approximately 1/1700-1/100,000, with development most likely related to invasive dental procedures. If an invasive dental procedure was necessary, preferably stop the bisphosphonate approximately 3 months prior to reduce the risk. Let your dentist know that you are on this medication.  The data available at this time suggests that there is probably a small increase risk of atypical (nontraumatic) subtrochanteric fractures of the femur in patients on bisphosphonate therapy compared to those not on it. One large study suggested that for every 100 fractures prevented with bisphosphonate therapy, less than one femur fracture will occur. Other studies suggest one episode per 2,500 patient years. Patient should call with leg pain.      The patient indicates understanding of these issues and agrees with the plan.      Guidelines for parathyroid surgery in asymptomatic primary hyperparathyroidism:    Serum Calcium >1.0 mg/dl (0.25 mmol/L) above  normal   Creatinine Clearance (calculated) Below 60m1/min /1.73 m2)   Bone Mineral Density T score < -2.5 SD at spine, hip (total or femoral neck) or radius (distal 1/3 site) or presence of fragility fracture   Age Age < 50 years        PARATHYROID GLAND IMAGING  Pre- operative imaging is of value prior to surgery in the localization of abnormal parathyroid tissue . The diagnosis of PHPT is based on the biochemical findings and is not affected by the results of the imaging studies. Sestamibi imaging is of value in localizing abnormal parathyroid tissue. The sensitivity and specificity varies among institutions.    More than 50% of the time spent with Ms. Link on counseling / coordinating her care. Spent 45 min.    Follow-up:  6 months    Keisha Castro MD  Endocrinology   [facility]/[place]  September 26, 2018  CC: Prisca Lucero      Addendum to above note and clinic visit:    Labs reviewed.    See result note/telephone encounter.

## 2019-03-27 NOTE — NURSING NOTE
"/66 (BP Location: Right arm, Patient Position: Sitting, Cuff Size: Adult Regular)   Pulse 74   Temp 98.2  F (36.8  C) (Oral)   Resp 18   Ht 1.676 m (5' 6\")   Wt 51.4 kg (113 lb 6.4 oz)   LMP  (LMP Unknown)   SpO2 98%   BMI 18.30 kg/m    Patient here for hyperparathyroidism.  "

## 2019-03-27 NOTE — LETTER
3/27/2019         RE: Devyn Link  [address] [facility] [address]        Dear Colleague,    Thank you for referring your patient, Devyn Link, to the [facility]. Please see a copy of my visit note below.    Name: Devyn Link  Seen for f/u of hyperPTH. Seen with .  HPI:  Devyn Link is a 73 year old female who presents for the evaluation of hyperPTH.  High calcium has been noted since 2007 on and off.  Other past medical history include CAD, history of aortic valve replacement, Hypertension, mitral valve replacement, pacemaker in place, prediabetes, osteoporosis without current pathological fracture.  Was on Amiodarone in past and now off X 6 months. Was on it for about 2-3 years.  DEXA 2017 consistent with osteoporosis. on fosamax since 11/2018.    Repeat labs showed high  Ca, high PTH, normal creatinine, 24 hr urine calcium.  Parathyroid Scan did not identify adenoma.    Energy levels are up and down.    History of Cancer:No  Thiazide Diuretic:No  Lithium use:No  Family History of pituitary adenoma, pancreas tumors, Zollinger-Diaz syndrome, pheochromocytoma. No  Kidney stones:No  Fractures: No. DEXA c/w osteoporosis- on fosamax since 11/2018.  Abdominal Pain:Yes (Please explain): sometimes. H/o peptic ulcer. Is followed up by GI.  Cramps: No  Constipation: no  Muscle Aches or pains: No  Muscle twitches: No  Nausea and vomiting: no  Confusion Lethargy and fatigue: better now  ON medications like Lithium/ HCTZ: no  Average Daily Calcium intake: dairy- not much  Ca and Vit D supplementation: taking some but not sure about the dose.    PMH/PSH:  Past Medical History:   Diagnosis Date     CAD (coronary artery disease) 12/13/2011    Followed every 6 months by Dr. Torre, [facility]      Chronic atrial fibrillation (H) 4/11/2013     Hyperlipidemia LDL goal <70 12/13/2011     Hypertension      Hypertension goal BP (blood pressure) < 140/90 12/14/2011     Myocardial infarction (H)       Pacemaker      Past Surgical History:   Procedure Laterality Date     C REPLACEMENT OF MITRAL VALVE  1994    Done at [facility]     COLONOSCOPY  06/13/2018    Dr. Alvarez [facility]     COLONOSCOPY N/A 6/13/2018    Procedure: COLONOSCOPY;;  Surgeon: Rosario Alvarez MD;  Location:  GI     ESOPHAGOSCOPY, GASTROSCOPY, DUODENOSCOPY (EGD), COMBINED  06/13/2018    Dr. Alvarez [facility]     ESOPHAGOSCOPY, GASTROSCOPY, DUODENOSCOPY (EGD), COMBINED N/A 6/13/2018    Procedure: COMBINED ESOPHAGOSCOPY, GASTROSCOPY, DUODENOSCOPY (EGD), BIOPSY SINGLE OR MULTIPLE;  COMBINED ESOPHAGOSCOPY, GASTROSCOPY, DUODENOSCOPY (EGD) with biopsies COLONOSCOPY   ;  Surgeon: Rosario Alvarez MD;  Location:  GI     HEART CATH DRUG ELUTING STENT PLACEMENT  2004/2007    seeing Cardiologist at [facility]     SURGICAL HISTORY OF -   ~ 1995    mechanical aortic valve     SURGICAL HISTORY OF -       pacemaker     SURGICAL HISTORY OF -       mechanical mitral valve     Family Hx:  Family History   Problem Relation Age of Onset     Hypertension Mother      Cancer Mother         lung     Heart Disease Mother      Cerebrovascular Disease Mother      Hypertension Sister      Colon Cancer No family hx of             Social Hx:  Social History     Socioeconomic History     Marital status:      Spouse name: Not on file     Number of children: Not on file     Years of education: Not on file     Highest education level: Not on file   Occupational History     Not on file   Social Needs     Financial resource strain: Not on file     Food insecurity:     Worry: Not on file     Inability: Not on file     Transportation needs:     Medical: Not on file     Non-medical: Not on file   Tobacco Use     Smoking status: Never Smoker     Smokeless tobacco: Never Used   Substance and Sexual Activity     Alcohol use: No     Drug use: No     Sexual activity: Yes     Partners: Male   Lifestyle     Physical activity:     Days per week: Not on file     Minutes per  "session: Not on file     Stress: Not on file   Relationships     Social connections:     Talks on phone: Not on file     Gets together: Not on file     Attends Oriental orthodox service: Not on file     Active member of club or organization: Not on file     Attends meetings of clubs or organizations: Not on file     Relationship status: Not on file     Intimate partner violence:     Fear of current or ex partner: Not on file     Emotionally abused: Not on file     Physically abused: Not on file     Forced sexual activity: Not on file   Other Topics Concern     Parent/sibling w/ CABG, MI or angioplasty before 65F 55M? Yes   Social History Narrative     Not on file          MEDICATIONS:  has a current medication list which includes the following prescription(s): alendronate, aspirin, atorvastatin, carvedilol, ferrous sulfate, furosemide, losartan, nitroglycerin, pantoprazole, and warfarin.    ROS     ROS: 10 point ROS neg other than the symptoms noted above in the HPI.    Physical Exam   VS: /66 (BP Location: Right arm, Patient Position: Sitting, Cuff Size: Adult Regular)   Pulse 74   Temp 98.2  F (36.8  C) (Oral)   Resp 18   Ht 1.676 m (5' 6\")   Wt 51.4 kg (113 lb 6.4 oz)   LMP  (LMP Unknown)   SpO2 98%   BMI 18.30 kg/m     /66 (BP Location: Right arm, Patient Position: Sitting, Cuff Size: Adult Regular)   Pulse 74   Temp 98.2  F (36.8  C) (Oral)   Resp 18   Ht 1.676 m (5' 6\")   Wt 51.4 kg (113 lb 6.4 oz)   LMP  (LMP Unknown)   SpO2 98%   BMI 18.30 kg/m     GENERAL: AXOX3, NAD, well dressed, answering questions appropriately, appears stated age.  HEENT: No exopthalmous, no proptosis, EOMI, no lig lag, no retraction  NECK: Thyroid normal in size, non tender, no nodules were palpated.  CV: RRR  LUNGS: CTAB  ABDOMEN: +BS  NEUROLOGY: CN grossly intact, no tremors  PSYCH: normal affect and mood      LABS:  Calcium:  ENDO CALCIUM LABS-UMP Latest Ref Rng & Units 3/13/2019   CALCIUM 8.5 - 10.1 mg/dL 10.5 " (H)   CALCIUM IONIZED 4.4 - 5.2 mg/dL 5.3 (H)     ENDO CALCIUM LABS-UMP Latest Ref Rng & Units 10/25/2018 9/27/2018   CALCIUM 8.5 - 10.1 mg/dL 10.9 (H)      ENDO CALCIUM LABS-UMP Latest Ref Rng & Units 9/26/2018   CALCIUM 8.5 - 10.1 mg/dL 10.1     ENDO CALCIUM LABS-UMP Latest Ref Rng & Units 9/10/2018 8/17/2018   CALCIUM 8.5 - 10.1 mg/dL 11.0 (H) 9.8     ENDO CALCIUM LABS-UMP Latest Ref Rng & Units 6/22/2018 6/14/2018   CALCIUM 8.5 - 10.1 mg/dL 10.2 (H)      ENDO CALCIUM LABS-UMP Latest Ref Rng & Units 6/5/2018   CALCIUM 8.5 - 10.1 mg/dL 10.6 (H)       PTH:  ENDO CALCIUM LABS-UMP Latest Ref Rng & Units 3/13/2019   PARATHYROID HORMONE INTACT 18 - 80 pg/mL 142 (H)     ENDO CALCIUM LABS-UMP Latest Ref Rng & Units 9/26/2018   PARATHYROID HORMONE INTACT 18 - 80 pg/mL 147 (H)     ENDO CALCIUM LABS-UMP Latest Ref Rng & Units 6/22/2018   PARATHYROID HORMONE INTACT 18 - 80 pg/mL 97 (H)     Vitamin D:  Lab Results   Component Value Date    VITDT 28 03/13/2019    VITDT 37 09/26/2018    VITDT 27 06/22/2018       TFTs:  Lab Results   Component Value Date    TSH 0.87 04/02/2018     ENDO CALCIUM LABS-UMP Latest Ref Rng & Units 9/27/2018   CALCIUM URINE G/24 H 0.10 - 0.30 g/24 h 0.17   CALCIUM URINE G/G CR g/g Cr 0.30   CALCIUM URINE MG/DL mg/dL 9.3     DEXA 10/2017:  REFERENCE T-SCORES:       Normal                -1.0 and greater                                 Osteopenia         Between -1.0 and -2.5                                           Osteoporosis     -2.5 and less                                       RISK FACTORS:  Post-menopausal, Height loss of 1.75 inches, Follow-up osteopenia, Low body weight     CURRENT TREATMENT:  Calcium, Vitamin D      FINDINGS:               Lumbar Spine (L1-L2; due to significant degenerative changes at L3 and L4, these were omitted)      T-score:  -3.3               Left Femoral Neck                      T-score:  -2.9               Right Femoral Neck                    T-score:   -3.0                             Lumbar (L1-L4) BMD: 0.876                                                           Total Hip Mean BMD: 0.663                    LATERAL VERTEBRAL ASSESSMENT  Procedure:  Vertebral fracture assessment was performed in the lateral decubitus position using a Lunar Prodigy  densitometer.  Indications for VFA: T-score of -1.0 or worse and age (female>69)  Confounding factors for VFA: Arthritis/degenerative disc disease, rib shadows.  The LVA scan is interpretable from T7 to L4.     VFA Findings: Using the semi-quantitative analysis of [employer] there was evidence of no spinal deformity  VFA Impression: Devyn Link has no vertebral fractures identified on the VFA.          IMPRESSION  Osteoporosis  Degenerative changes of the spine  Recommendations include ensuring adequate daily Calcium and Vitamin D intake  Follow up scan can be considered in three years.     Patient had a study performed previously, however the prior images are not available to compare to the current study.     Current NOF guidelines recommend treatment for patients with the following:  - Prior hip or vertebral fracture  - T-score -2.5 or below  - A 10 year risk of any major osteoporotic fracture >20% or 10 year risk of hip fracture >3%, as calculated using the FRAX calculator (www.shef.ac.uk/FRAX).       Based on these guidelines, treatment (in addition to calcium and vitamin D) is recommended for this patient, after ruling out other causes of osteoporosis/low bone density.  While this is meant as an aid to clinical decision-making, clinical judgment must still be used.     NM PARATHYROID PLANAR W/SPECT AND CT DUAL   11/20/2018 4:19 PM      TECHNIQUE:  766uci I123 PO; 24.6mCi Tc99m Sestamibi injected in the  right hand @1442.     HISTORY:  Hypercalcemia. Hyperparathyroidism.     COMPARISON:   Nuclear Study: None.  Other Relevant Studies: None.     FINDINGS:  Planar subtraction images show a normal appearance of  the  thyroid gland with no focal lesion to indicate a parathyroid adenoma.  Likewise, the fused SPECT-CT images show no focal lesion or activity  to suggest a parathyroid adenoma. Noncontrast CT scan shows no  evidence of a parathyroid adenoma. 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         IMPRESSION: No nuclear or CT evidence of a parathyroid adenoma.  All pertinent notes, labs, and images personally reviewed by me.     A/P  Ms.Xiang Link is a 73 year old here for the evaluation of:    1. Hyperparathyroidism:  In the differential diagnosis of hypercalcemia, primary hyperparathyroidism is the most common cause. It is important to distinguish readily between primary hyperparathyroidism and other causes of hypercalcemia.    High calcium levels noted on and off since 2007.  More consistently high calcium level since 2017  Slightly elevated parathyroid hormone  Kidney function is in normal range  Vitamin D in normal range  Not on thaizide diuretic or lithium.  History of osteoporosis based on DEXA scan done 2017. On fosamax since 11/2018.  History of peptic ulcer.  Followed by GI. Gastrin slightly high- in the setting of PPI use.  Plan:  Discussed diagnosis, pathophysiology, management and treatment options of condition with pt.  Discussed close monitoring of labs at periodic interval vs neck exploration for parathyroid adenoma.  At this time she would like to continue to monitor.  Plan to repeat labs in 6 months  Please make a lab appointment for blood work and follow up clinic appointment in 1 week after that to discuss results.    Discussed s/s of hypercalcemia and recommend adequate hydration.           2.  Osteoporosis:  DEXA 2017 c/w osteoporosis  On fosamax since 11/2018.  Tolerating well.  Plan:  Discussed diagnosis,  pathophysiology, management and treatment options of condition with pt.  Continue Fosamax 70 mg ONCE a week (started 11/21/2018)  The pt was advised to    Maintain an adequate calcium and vitamin D intake and to supplement vitamin D if needed to maintain serum levels of 25 hydroxy D (25 OH D) between 30-60 ng/ml.    Limit alcohol intake to no more than 2 servings per day.    Limit caffeine intake.    Maintain an active lifestyle including weight-bearing exercises for at least 30 mins daily.    Take measures to reduce the risk of falling.  Discussed indications, risks and benefits of all medications prescribed, and answered questions to patient's satisfaction.  Instructions on Fosamax use and side effects - particularly esophageal adverse events - are carefully reviewed with her. This drug must be taken upon arising for the day on an empty stomach, with a large 6-8 ounce glass of water; she must remain NPO in the upright position for at least 30 minutes afterwards and until after the first food of the day. If esophageal irritation is noted, she will stop the drug and call my office.  Treatment with bisphosphonate therapy will decrease fracture risk 50-70%.   There is risk of osteonecrosis of the jaw in patients using bisphosphonates is approximately 1/1700-1/100,000, with development most likely related to invasive dental procedures. If an invasive dental procedure was necessary, preferably stop the bisphosphonate approximately 3 months prior to reduce the risk. Let your dentist know that you are on this medication.  The data available at this time suggests that there is probably a small increase risk of atypical (nontraumatic) subtrochanteric fractures of the femur in patients on bisphosphonate therapy compared to those not on it. One large study suggested that for every 100 fractures prevented with bisphosphonate therapy, less than one femur fracture will occur. Other studies suggest one episode per 2,500 patient  years. Patient should call with leg pain.      The patient indicates understanding of these issues and agrees with the plan.      Guidelines for parathyroid surgery in asymptomatic primary hyperparathyroidism:    Serum Calcium >1.0 mg/dl (0.25 mmol/L) above normal   Creatinine Clearance (calculated) Below 60m1/min /1.73 m2)   Bone Mineral Density T score < -2.5 SD at spine, hip (total or femoral neck) or radius (distal 1/3 site) or presence of fragility fracture   Age Age < 50 years        PARATHYROID GLAND IMAGING  Pre- operative imaging is of value prior to surgery in the localization of abnormal parathyroid tissue . The diagnosis of PHPT is based on the biochemical findings and is not affected by the results of the imaging studies. Sestamibi imaging is of value in localizing abnormal parathyroid tissue. The sensitivity and specificity varies among institutions.    More than 50% of the time spent with Ms. Link on counseling / coordinating her care. Spent 45 min.    Follow-up:  6 months    Keisha Castro MD  Endocrinology   [facility]  September 26, 2018  CC: Prisca Lucero      Addendum to above note and clinic visit:    Labs reviewed.    See result note/telephone encounter.            Again, thank you for allowing me to participate in the care of your patient.        Sincerely,        Keisha Castro MD

## 2019-03-27 NOTE — PATIENT INSTRUCTIONS
[facility]   Dr Castro, Endocrinology Department      [facility]   [address] [address]  Appointment [phone]  Fax: [phone]   Monday and Tuesday         [facility]   [address] [facility] [place] [address]  Appointment [phone]  Fax: [phone]  Wednesday and Thursday           Plan to continue to monitor.  Labs in 6 months  Please make a lab appointment for blood work and follow up clinic appointment in 1 week after that to discuss results.  Continue fosamax for osteoporosis  Bring calcium and vit D supplement to next clinic visit.    The pt was advised to    Maintain an adequate calcium and vitamin D intake and to supplement vitamin D if needed to maintain serum levels of 25 hydroxy D (25 OH D) in normal range    Limit alcohol intake to no more than 2 servings per day.    Limit caffeine intake.    Maintain an active lifestyle including weight-bearing exercises for at least 30 mins daily.    Take measures to reduce the risk of falling.

## 2019-06-10 ENCOUNTER — TRANSFERRED RECORDS (OUTPATIENT)
Dept: HEALTH INFORMATION MANAGEMENT | Facility: CLINIC | Age: 74
End: 2019-06-10

## 2019-07-03 DIAGNOSIS — Z87.898 HISTORY OF ULCER DISEASE: ICD-10-CM

## 2019-07-03 NOTE — TELEPHONE ENCOUNTER
Pt  calling for refill of med for stomach problem.   Please call  (goes by Rico) at [phone] or [phone].  Pt uses [facility] mail order.   Thank you.  megha

## 2019-07-05 RX ORDER — PANTOPRAZOLE SODIUM 40 MG/1
TABLET, DELAYED RELEASE ORAL
Qty: 90 TABLET | Refills: 0 | Status: SHIPPED | OUTPATIENT
Start: 2019-07-05 | End: 2019-09-26

## 2019-07-05 NOTE — TELEPHONE ENCOUNTER
Prescription approved per [facility] Refill Protocol.  Kalyani Alvarado RN

## 2019-07-25 LAB — INR PPP: 3.8

## 2019-08-15 DIAGNOSIS — M81.8 OTHER OSTEOPOROSIS WITHOUT CURRENT PATHOLOGICAL FRACTURE: ICD-10-CM

## 2019-08-15 RX ORDER — ALENDRONATE SODIUM 70 MG/1
TABLET ORAL
Qty: 12 TABLET | Refills: 3 | Status: SHIPPED | OUTPATIENT
Start: 2019-08-15 | End: 2020-06-30

## 2019-08-15 NOTE — TELEPHONE ENCOUNTER
"Requested Prescriptions   Pending Prescriptions Disp Refills     alendronate (FOSAMAX) 70 MG tablet [Pharmacy Med Name: ALENDRONATE SODIUM 70 MG Tablet] 12 tablet 3     Sig: TAKE 1 TABLET BY MOUTH EVERY 7 DAYS WITH 8 OUNCES OF WATER 30 MINUTES BEFORE BREAKFAST AND REMAIN UPRIGHT DURING THIS TIME.   Last Written Prescription Date:  11/21/18  Last Fill Quantity: 12,  # refills: 3   Last office visit: 3/27/2019 with prescribing provider:  yes   Future Office Visit:   Next 5 appointments (look out 90 days)    Sep 19, 2019 11:00 AM CDT  Return Visit with Keisha Castro MD  [facility] ([facility] [address] [facility] [address]  [facility] [address]  [phone]             Bisphosphonates Passed - 8/15/2019  1:45 AM        Passed - Recent (12 mo) or future (30 days) visit within the authorizing provider's specialty     Patient had office visit in the last 12 months or has a visit in the next 30 days with authorizing provider or within the authorizing provider's specialty.  See \"Patient Info\" tab in inbasket, or \"Choose Columns\" in Meds & Orders section of the refill encounter.              Passed - Dexa on file within past 2 years     Please review last Dexa result.           Passed - Medication is active on med list        Passed - Patient is age 18 or older        Passed - Normal serum creatinine on file within past 12 months     Recent Labs   Lab Test 03/13/19  1058   CR 0.64               "

## 2019-08-26 LAB — INR PPP: 3

## 2019-09-12 DIAGNOSIS — E83.52 HYPERCALCEMIA: ICD-10-CM

## 2019-09-12 DIAGNOSIS — E21.3 HYPERPARATHYROIDISM (H): ICD-10-CM

## 2019-09-12 LAB
CA-I SERPL ISE-MCNC: 5.3 MG/DL (ref 4.4–5.2)
PTH-INTACT SERPL-MCNC: 110 PG/ML (ref 18–80)

## 2019-09-12 PROCEDURE — 82330 ASSAY OF CALCIUM: CPT | Performed by: INTERNAL MEDICINE

## 2019-09-12 PROCEDURE — 82310 ASSAY OF CALCIUM: CPT | Performed by: INTERNAL MEDICINE

## 2019-09-12 PROCEDURE — 83735 ASSAY OF MAGNESIUM: CPT | Performed by: INTERNAL MEDICINE

## 2019-09-12 PROCEDURE — 84100 ASSAY OF PHOSPHORUS: CPT | Performed by: INTERNAL MEDICINE

## 2019-09-12 PROCEDURE — 82306 VITAMIN D 25 HYDROXY: CPT | Performed by: INTERNAL MEDICINE

## 2019-09-12 PROCEDURE — 36415 COLL VENOUS BLD VENIPUNCTURE: CPT | Performed by: INTERNAL MEDICINE

## 2019-09-12 PROCEDURE — 82565 ASSAY OF CREATININE: CPT | Performed by: INTERNAL MEDICINE

## 2019-09-12 PROCEDURE — 83970 ASSAY OF PARATHORMONE: CPT | Performed by: INTERNAL MEDICINE

## 2019-09-13 LAB
CALCIUM SERPL-MCNC: 9.8 MG/DL (ref 8.5–10.1)
CREAT SERPL-MCNC: 0.72 MG/DL (ref 0.52–1.04)
GFR SERPL CREATININE-BSD FRML MDRD: 82 ML/MIN/{1.73_M2}
MAGNESIUM SERPL-MCNC: 2.3 MG/DL (ref 1.6–2.3)
PHOSPHATE SERPL-MCNC: 2.3 MG/DL (ref 2.5–4.5)

## 2019-09-16 LAB — DEPRECATED CALCIDIOL+CALCIFEROL SERPL-MC: 33 UG/L (ref 20–75)

## 2019-09-17 ENCOUNTER — TELEPHONE (OUTPATIENT)
Dept: ENDOCRINOLOGY | Facility: CLINIC | Age: 74
End: 2019-09-17

## 2019-09-18 NOTE — TELEPHONE ENCOUNTER
Component      Latest Ref Rng & Units 9/12/2019   Creatinine      0.52 - 1.04 mg/dL 0.72   GFR Estimate      >60 mL/min/1.73:m2 82   GFR Estimate If Black      >60 mL/min/1.73:m2 >90   Calcium Ionized      4.4 - 5.2 mg/dL 5.3 (H)   Calcium      8.5 - 10.1 mg/dL 9.8   Vitamin D Deficiency screening      20 - 75 ug/L 33   Parathyroid Hormone Intact      18 - 80 pg/mL 110 (H)   Magnesium      1.6 - 2.3 mg/dL 2.3   Phosphorus      2.5 - 4.5 mg/dL 2.3 (L)     Has 9/19 appointment but has to be rescheduled 2/2 to scheduling error.  In general labs appear stable.  Please help schedule next available clinic visit.    Please inform patient.  May need .

## 2019-09-19 NOTE — TELEPHONE ENCOUNTER
Patient showed up for today's appt, they will reschedule her.    Dominique Perez, [facility] Endocrinology  [place]

## 2019-09-26 DIAGNOSIS — Z87.898 HISTORY OF ULCER DISEASE: ICD-10-CM

## 2019-09-27 RX ORDER — PANTOPRAZOLE SODIUM 40 MG/1
TABLET, DELAYED RELEASE ORAL
Qty: 90 TABLET | Refills: 0 | Status: SHIPPED | OUTPATIENT
Start: 2019-09-27 | End: 2019-11-14

## 2019-09-27 NOTE — TELEPHONE ENCOUNTER
"Requested Prescriptions   Pending Prescriptions Disp Refills     pantoprazole (PROTONIX) 40 MG EC tablet [Pharmacy Med Name: PANTOPRAZOLE SODIUM 40 MG Tablet Delayed Release] 90 tablet 0     Sig: TAKE 1 TABLET EVERY DAY   Last Written Prescription Date:  7/5/19  Last Fill Quantity: 90,  # refills: 0   Last office visit: 10/25/2018 with prescribing provider:     Prisca Lucero MD        Future Office Visit:   Next 5 appointments (look out 90 days)    Oct 04, 2019 12:30 PM CDT  Return Visit with IVETT Sahni CNP  [facility] ([facility]) [address] [facility] [address]  [phone]             PPI Protocol Failed - 9/26/2019  9:12 PM        Failed - No diagnosis of osteoporosis on record        Passed - Not on Clopidogrel (unless Pantoprazole ordered)        Passed - Recent (12 mo) or future (30 days) visit within the authorizing provider's specialty     Patient had office visit in the last 12 months or has a visit in the next 30 days with authorizing provider or within the authorizing provider's specialty.  See \"Patient Info\" tab in inbasket, or \"Choose Columns\" in Meds & Orders section of the refill encounter.              Passed - Medication is active on med list        Passed - Patient is age 18 or older        Passed - No active pregnacy on record        Passed - No positive pregnancy test in past 12 months          "

## 2019-09-30 ENCOUNTER — TRANSFERRED RECORDS (OUTPATIENT)
Dept: HEALTH INFORMATION MANAGEMENT | Facility: CLINIC | Age: 74
End: 2019-09-30

## 2019-09-30 LAB — INR PPP: 3.2

## 2019-10-04 ENCOUNTER — OFFICE VISIT (OUTPATIENT)
Dept: ENDOCRINOLOGY | Facility: CLINIC | Age: 74
End: 2019-10-04
Payer: COMMERCIAL

## 2019-10-04 VITALS
RESPIRATION RATE: 16 BRPM | BODY MASS INDEX: 17.8 KG/M2 | DIASTOLIC BLOOD PRESSURE: 80 MMHG | OXYGEN SATURATION: 98 % | TEMPERATURE: 97.6 F | WEIGHT: 110.3 LBS | SYSTOLIC BLOOD PRESSURE: 146 MMHG | HEART RATE: 76 BPM

## 2019-10-04 DIAGNOSIS — E83.52 HYPERCALCEMIA: Primary | ICD-10-CM

## 2019-10-04 DIAGNOSIS — M81.8 OTHER OSTEOPOROSIS WITHOUT CURRENT PATHOLOGICAL FRACTURE: ICD-10-CM

## 2019-10-04 DIAGNOSIS — E21.3 HYPERPARATHYROIDISM (H): ICD-10-CM

## 2019-10-04 PROCEDURE — 99215 OFFICE O/P EST HI 40 MIN: CPT | Performed by: CLINICAL NURSE SPECIALIST

## 2019-10-04 NOTE — LETTER
10/4/2019         RE: Devyn Link  [id number] [facility] [id number]        Dear Colleague,    Thank you for referring your patient, Devyn Link, to the [facility]. Please see a copy of my visit note below.    Name: Devyn Link  Seen for f/u of hyperPTH. Seen with .  Last seen: 3/19/2019 by Dr. Castro  HPI:  Devyn Link is a 73 year old female who presents for the management of hyperPTH.  High calcium has been noted since 2007 on and off.  Other past medical history include CAD, history of aortic valve replacement, Hypertension, mitral valve replacement, pacemaker in place, prediabetes, osteoporosis without current pathological fracture.  Was on Amiodarone in past and now off > 6 months. Was on it for about 2-3 years.  DEXA 2017 consistent with osteoporosis. on fosamax since 11/2018.    Repeat labs showed high  Ca, high PTH, normal creatinine, 24 hr urine calcium.  Parathyroid Scan did not identify adenoma.    Energy levels vary but no change since last seen.    History of Cancer:No  Thiazide Diuretic:No  Lithium use:No  Family History of pituitary adenoma, pancreas tumors, Zollinger-Diaz syndrome, pheochromocytoma. No  Kidney stones:No  Fractures: No. DEXA c/w osteoporosis- on fosamax since 11/2018.  Abdominal Pain:Yes (Please explain): sometimes. H/o peptic ulcer. Is followed up by GI.  Cramps: No  Constipation: no   Muscle Aches or pains: No  Muscle twitches: No  Nausea and vomiting: no  Confusion Lethargy and fatigue: No confusion or lethargy, fatigue off and on   ON medications like Lithium/ HCTZ: no  Average Daily Calcium intake: dairy- not much  Ca and Vit D supplementation: taking some but not sure about the dose.    PMH/PSH:  Past Medical History:   Diagnosis Date     CAD (coronary artery disease) 12/13/2011    Followed every 6 months by Dr. Torre, [facility]      Chronic atrial fibrillation (H) 4/11/2013     Hyperlipidemia LDL goal <70 12/13/2011     Hypertension       Hypertension goal BP (blood pressure) < 140/90 12/14/2011     Myocardial infarction (H)      Pacemaker      Past Surgical History:   Procedure Laterality Date     C REPLACEMENT OF MITRAL VALVE  1994    Done at [facility]     COLONOSCOPY  06/13/2018    Dr. Alvarez [facility]     COLONOSCOPY N/A 6/13/2018    Procedure: COLONOSCOPY;;  Surgeon: Rosario Alvarez MD;  Location:  GI     ESOPHAGOSCOPY, GASTROSCOPY, DUODENOSCOPY (EGD), COMBINED  06/13/2018    Dr. Kim KEITH     ESOPHAGOSCOPY, GASTROSCOPY, DUODENOSCOPY (EGD), COMBINED N/A 6/13/2018    Procedure: COMBINED ESOPHAGOSCOPY, GASTROSCOPY, DUODENOSCOPY (EGD), BIOPSY SINGLE OR MULTIPLE;  COMBINED ESOPHAGOSCOPY, GASTROSCOPY, DUODENOSCOPY (EGD) with biopsies COLONOSCOPY   ;  Surgeon: Rosario Alvarez MD;  Location:  GI     HEART CATH DRUG ELUTING STENT PLACEMENT  2004/2007    seeing Cardiologist at [facility]     SURGICAL HISTORY OF -   ~ 1995    mechanical aortic valve     SURGICAL HISTORY OF -       pacemaker     SURGICAL HISTORY OF -       mechanical mitral valve     Family Hx:  Family History   Problem Relation Age of Onset     Hypertension Mother      Cancer Mother         lung     Heart Disease Mother      Cerebrovascular Disease Mother      Hypertension Sister      Colon Cancer No family hx of             Social Hx:  Social History     Socioeconomic History     Marital status:      Spouse name: Not on file     Number of children: Not on file     Years of education: Not on file     Highest education level: Not on file   Occupational History     Not on file   Social Needs     Financial resource strain: Not on file     Food insecurity:     Worry: Not on file     Inability: Not on file     Transportation needs:     Medical: Not on file     Non-medical: Not on file   Tobacco Use     Smoking status: Never Smoker     Smokeless tobacco: Never Used   Substance and Sexual Activity     Alcohol use: No     Drug use: No     Sexual activity: Yes      Partners: Male   Lifestyle     Physical activity:     Days per week: Not on file     Minutes per session: Not on file     Stress: Not on file   Relationships     Social connections:     Talks on phone: Not on file     Gets together: Not on file     Attends [employer] service: Not on file     Active member of club or organization: Not on file     Attends meetings of clubs or organizations: Not on file     Relationship status: Not on file     Intimate partner violence:     Fear of current or ex partner: Not on file     Emotionally abused: Not on file     Physically abused: Not on file     Forced sexual activity: Not on file   Other Topics Concern     Parent/sibling w/ CABG, MI or angioplasty before 65F 55M? Yes   Social History Narrative     Not on file          MEDICATIONS:  has a current medication list which includes the following prescription(s): alendronate, aspirin, atorvastatin, carvedilol, ferrous sulfate, furosemide, losartan, nitroglycerin, pantoprazole, and warfarin anticoagulant.    ROS     ROS: 10 point ROS neg other than the symptoms noted above in the HPI.    Physical Exam   VS: LMP  (LMP Unknown)    LMP  (LMP Unknown)   GENERAL: AXOX3, NAD, well dressed, answering questions appropriately, appears stated age.  HEENT: No exopthalmous, no proptosis, EOMI, no lig lag, no retraction  NECK: Thyroid normal in size, non tender, no nodules were palpated.  CV: RRR  LUNGS: CTAB  ABDOMEN:   NEUROLOGY: CN grossly intact, no tremors  PSYCH: normal affect and mood      LABS:  Calcium:  ENDO CALCIUM LABS-UMP Latest Ref Rng & Units 9/12/2019   CALCIUM 8.5 - 10.1 mg/dL 9.8   CALCIUM IONIZED 4.4 - 5.2 mg/dL 5.3 (H)     ENDO CALCIUM LABS-UMP Latest Ref Rng & Units 3/13/2019   CALCIUM 8.5 - 10.1 mg/dL 10.5 (H)   CALCIUM IONIZED 4.4 - 5.2 mg/dL 5.3 (H)     ENDO CALCIUM LABS-UMP Latest Ref Rng & Units 10/25/2018 9/27/2018   CALCIUM 8.5 - 10.1 mg/dL 10.9 (H)      ENDO CALCIUM LABS-UMP Latest Ref Rng & Units 9/26/2018   CALCIUM  8.5 - 10.1 mg/dL 10.1     ENDO CALCIUM LABS-UMP Latest Ref Rng & Units 9/10/2018 8/17/2018   CALCIUM 8.5 - 10.1 mg/dL 11.0 (H) 9.8     ENDO CALCIUM LABS-UMP Latest Ref Rng & Units 6/22/2018 6/14/2018   CALCIUM 8.5 - 10.1 mg/dL 10.2 (H)      ENDO CALCIUM LABS-UMP Latest Ref Rng & Units 6/5/2018   CALCIUM 8.5 - 10.1 mg/dL 10.6 (H)     PTH:  Component 9/12/2019   Parathyroid Hormone Intact      18 - 80 pg/mL 110 (H)     ENDO CALCIUM LABS-UMP Latest Ref Rng & Units 3/13/2019   PARATHYROID HORMONE INTACT 18 - 80 pg/mL 142 (H)     ENDO CALCIUM LABS-UMP Latest Ref Rng & Units 9/26/2018   PARATHYROID HORMONE INTACT 18 - 80 pg/mL 147 (H)     ENDO CALCIUM LABS-UMP Latest Ref Rng & Units 6/22/2018   PARATHYROID HORMONE INTACT 18 - 80 pg/mL 97 (H)     Vitamin D:   Component 9/12/2019   Vitamin D Deficiency screening      20 - 75 ug/L 33     Lab Results   Component Value Date    VITDT 33 09/12/2019    VITDT 28 03/13/2019    VITDT 37 09/26/2018    VITDT 27 06/22/2018     TFTs:  Lab Results   Component Value Date    TSH 0.87 04/02/2018     ENDO CALCIUM LABS-UMP Latest Ref Rng & Units 9/27/2018   CALCIUM URINE G/24 H 0.10 - 0.30 g/24 h 0.17   CALCIUM URINE G/G CR g/g Cr 0.30   CALCIUM URINE MG/DL mg/dL 9.3     DEXA 10/2017:  REFERENCE T-SCORES:       Normal                -1.0 and greater                                 Osteopenia         Between -1.0 and -2.5                                           Osteoporosis     -2.5 and less                                       RISK FACTORS:  Post-menopausal, Height loss of 1.75 inches, Follow-up osteopenia, Low body weight     CURRENT TREATMENT:  Calcium, Vitamin D      FINDINGS:               Lumbar Spine (L1-L2; due to significant degenerative changes at L3 and L4, these were omitted)      T-score:  -3.3               Left Femoral Neck                      T-score:  -2.9               Right Femoral Neck                    T-score:  -3.0                             Lumbar (L1-L4) BMD:  0.876                                                           Total Hip Mean BMD: 0.663                    LATERAL VERTEBRAL ASSESSMENT  Procedure:  Vertebral fracture assessment was performed in the lateral decubitus position using a Lunar Prodigy  densitometer.  Indications for VFA: T-score of -1.0 or worse and age (female>69)  Confounding factors for VFA: Arthritis/degenerative disc disease, rib shadows.  The LVA scan is interpretable from T7 to L4.     VFA Findings: Using the semi-quantitative analysis of [employer] there was evidence of no spinal deformity  VFA Impression: Devyn Link has no vertebral fractures identified on the VFA.          IMPRESSION  Osteoporosis  Degenerative changes of the spine  Recommendations include ensuring adequate daily Calcium and Vitamin D intake  Follow up scan can be considered in three years.     Patient had a study performed previously, however the prior images are not available to compare to the current study.     Current NOF guidelines recommend treatment for patients with the following:  - Prior hip or vertebral fracture  - T-score -2.5 or below  - A 10 year risk of any major osteoporotic fracture >20% or 10 year risk of hip fracture >3%, as calculated using the FRAX calculator (www.shef.ac.uk/FRAX).       Based on these guidelines, treatment (in addition to calcium and vitamin D) is recommended for this patient, after ruling out other causes of osteoporosis/low bone density.  While this is meant as an aid to clinical decision-making, clinical judgment must still be used.     NM PARATHYROID PLANAR W/SPECT AND CT DUAL   11/20/2018      TECHNIQUE:  766uci I123 PO; 24.6mCi Tc99m Sestamibi injected in the  right hand @1442.     HISTORY:  Hypercalcemia. Hyperparathyroidism.     COMPARISON:   Nuclear Study: None.  Other Relevant Studies: None.     FINDINGS:  Planar subtraction images show a normal appearance of the  thyroid gland with no focal lesion to indicate a parathyroid  adenoma.  Likewise, the fused SPECT-CT images show no focal lesion or activity  to suggest a parathyroid adenoma. Noncontrast CT scan shows no  evidence of a parathyroid adenoma. 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         IMPRESSION: No nuclear or CT evidence of a parathyroid adenoma.  All pertinent notes, labs, and images personally reviewed by me.     A/P  Ms.Xiang Link is a 73 year old here for the management of:    1. Hyperparathyroidism:    Recent labs stable.  Results reviewed in detail.  In the differential diagnosis of hypercalcemia, primary hyperparathyroidism is the most common cause. It is important to distinguish readily between primary hyperparathyroidism and other causes of hypercalcemia.    High calcium levels noted on and off since 2007.  More consistently high calcium level since 2017  Slightly elevated parathyroid hormone  Kidney function is in normal range  Vitamin D in normal range  Not on thaizide diuretic or lithium.  History of osteoporosis based on DEXA scan done 2017. On fosamax since 11/2018.    History of peptic ulcer.  Followed by GI. Gastrin slightly high- in the setting of PPI use.    Plan:  Discussed diagnosis, pathophysiology, management and treatment options of condition with pt.  Discussed close monitoring of labs at periodic interval vs neck exploration for parathyroid adenoma.  At this time she still prefers to continue to monitor.  Continue weekly Fosamax.  Plan to repeat labs in 6 months  F/u with Dr. Castro in 6 months with labs one week prior.    Discussed s/s of hypercalcemia and recommend adequate hydration.       2.  Osteoporosis:  DEXA 2017 c/w osteoporosis  On fosamax since 11/2018.  Tolerating well.  Plan:  Discussed diagnosis, pathophysiology, management and treatment  options of condition with pt.  Continue Fosamax 70 mg ONCE a week (started 11/21/2018)  The pt was advised to    Maintain an adequate calcium and vitamin D intake and to supplement vitamin D if needed to maintain serum levels of 25 hydroxy D (25 OH D) between 30-60 ng/ml.    Limit alcohol intake to no more than 2 servings per day.    Limit caffeine intake.    Maintain an active lifestyle including weight-bearing exercises for at least 30 mins daily.    Take measures to reduce the risk of falling.  Discussed indications, risks and benefits of all medications prescribed, and answered questions to patient's satisfaction.  Instructions on Fosamax use and side effects - particularly esophageal adverse events - are carefully reviewed with her. This drug must be taken upon arising for the day on an empty stomach, with a large 6-8 ounce glass of water; she must remain NPO in the upright position for at least 30 minutes afterwards and until after the first food of the day. If esophageal irritation is noted, she will stop the drug and call my office.  Treatment with bisphosphonate therapy will decrease fracture risk 50-70%.   There is risk of osteonecrosis of the jaw in patients using bisphosphonates is approximately 1/1700-1/100,000, with development most likely related to invasive dental procedures. If an invasive dental procedure was necessary, preferably stop the bisphosphonate approximately 3 months prior to reduce the risk. Let your dentist know that you are on this medication.  The data available at this time suggests that there is probably a small increase risk of atypical (nontraumatic) subtrochanteric fractures of the femur in patients on bisphosphonate therapy compared to those not on it. One large study suggested that for every 100 fractures prevented with bisphosphonate therapy, less than one femur fracture will occur. Other studies suggest one episode per 2,500 patient years. Patient should call with leg  pain.      The patient indicates understanding of these issues and agrees with the plan.      Guidelines for parathyroid surgery in asymptomatic primary hyperparathyroidism:    Serum Calcium >1.0 mg/dl (0.25 mmol/L) above normal   Creatinine Clearance (calculated) Below 60m1/min /1.73 m2)   Bone Mineral Density T score < -2.5 SD at spine, hip (total or femoral neck) or radius (distal 1/3 site) or presence of fragility fracture   Age Age < 50 years        PARATHYROID GLAND IMAGING  Pre- operative imaging is of value prior to surgery in the localization of abnormal parathyroid tissue . The diagnosis of PHPT is based on the biochemical findings and is not affected by the results of the imaging studies. Sestamibi imaging is of value in localizing abnormal parathyroid tissue. The sensitivity and specificity varies among institutions.    More than 50% of the time spent with Ms. Link on counseling / coordinating her care. Spent 45 min.    Follow-up:  6 months with Dr. Castro, labs one week prior.      Afua Guillory NP  Endocrinology   [facility]  September 26, 2018  CC: Prisca Lucero MD              Again, thank you for allowing me to participate in the care of your patient.        Sincerely,        IVETT Sahni CNP

## 2019-10-04 NOTE — PROGRESS NOTES
Name: Devyn Link  Seen for f/u of hyperPTH. Seen with .  Last seen: 3/19/2019 by Dr. Castro  HPI:  Devyn Link is a 73 year old female who presents for the management of hyperPTH.  High calcium has been noted since 2007 on and off.  Other past medical history include CAD, history of aortic valve replacement, Hypertension, mitral valve replacement, pacemaker in place, prediabetes, osteoporosis without current pathological fracture.  Was on Amiodarone in past and now off > 6 months. Was on it for about 2-3 years.  DEXA 2017 consistent with osteoporosis. on fosamax since 11/2018.    Repeat labs showed high  Ca, high PTH, normal creatinine, 24 hr urine calcium.  Parathyroid Scan did not identify adenoma.    Energy levels vary but no change since last seen.    History of Cancer:No  Thiazide Diuretic:No  Lithium use:No  Family History of pituitary adenoma, pancreas tumors, Zollinger-Diaz syndrome, pheochromocytoma. No  Kidney stones:No  Fractures: No. DEXA c/w osteoporosis- on fosamax since 11/2018.  Abdominal Pain:Yes (Please explain): sometimes. H/o peptic ulcer. Is followed up by GI.  Cramps: No  Constipation: no   Muscle Aches or pains: No  Muscle twitches: No  Nausea and vomiting: no  Confusion Lethargy and fatigue: No confusion or lethargy, fatigue off and on   ON medications like Lithium/ HCTZ: no  Average Daily Calcium intake: dairy- not much  Ca and Vit D supplementation: taking some but not sure about the dose.    PMH/PSH:  Past Medical History:   Diagnosis Date     CAD (coronary artery disease) 12/13/2011    Followed every 6 months by Dr. Torre, [facility]      Chronic atrial fibrillation (H) 4/11/2013     Hyperlipidemia LDL goal <70 12/13/2011     Hypertension      Hypertension goal BP (blood pressure) < 140/90 12/14/2011     Myocardial infarction (H)      Pacemaker      Past Surgical History:   Procedure Laterality Date     C REPLACEMENT OF MITRAL VALVE  1994    Done at [facility]      COLONOSCOPY  06/13/2018    Dr. Alvarez [facility]     COLONOSCOPY N/A 6/13/2018    Procedure: COLONOSCOPY;;  Surgeon: Rosario Alvarez MD;  Location:  GI     ESOPHAGOSCOPY, GASTROSCOPY, DUODENOSCOPY (EGD), COMBINED  06/13/2018    Dr. Kim KEITH     ESOPHAGOSCOPY, GASTROSCOPY, DUODENOSCOPY (EGD), COMBINED N/A 6/13/2018    Procedure: COMBINED ESOPHAGOSCOPY, GASTROSCOPY, DUODENOSCOPY (EGD), BIOPSY SINGLE OR MULTIPLE;  COMBINED ESOPHAGOSCOPY, GASTROSCOPY, DUODENOSCOPY (EGD) with biopsies COLONOSCOPY   ;  Surgeon: Rosario Alvarez MD;  Location:  GI     HEART CATH DRUG ELUTING STENT PLACEMENT  2004/2007    seeing Cardiologist at [facility]     SURGICAL HISTORY OF -   ~ 1995    mechanical aortic valve     SURGICAL HISTORY OF -       pacemaker     SURGICAL HISTORY OF -       mechanical mitral valve     Family Hx:  Family History   Problem Relation Age of Onset     Hypertension Mother      Cancer Mother         lung     Heart Disease Mother      Cerebrovascular Disease Mother      Hypertension Sister      Colon Cancer No family hx of             Social Hx:  Social History     Socioeconomic History     Marital status:      Spouse name: Not on file     Number of children: Not on file     Years of education: Not on file     Highest education level: Not on file   Occupational History     Not on file   Social Needs     Financial resource strain: Not on file     Food insecurity:     Worry: Not on file     Inability: Not on file     Transportation needs:     Medical: Not on file     Non-medical: Not on file   Tobacco Use     Smoking status: Never Smoker     Smokeless tobacco: Never Used   Substance and Sexual Activity     Alcohol use: No     Drug use: No     Sexual activity: Yes     Partners: Male   Lifestyle     Physical activity:     Days per week: Not on file     Minutes per session: Not on file     Stress: Not on file   Relationships     Social connections:     Talks on phone: Not on file     Gets together: Not on  file     Attends Gnosticism service: Not on file     Active member of club or organization: Not on file     Attends meetings of clubs or organizations: Not on file     Relationship status: Not on file     Intimate partner violence:     Fear of current or ex partner: Not on file     Emotionally abused: Not on file     Physically abused: Not on file     Forced sexual activity: Not on file   Other Topics Concern     Parent/sibling w/ CABG, MI or angioplasty before 65F 55M? Yes   Social History Narrative     Not on file          MEDICATIONS:  has a current medication list which includes the following prescription(s): alendronate, aspirin, atorvastatin, carvedilol, ferrous sulfate, furosemide, losartan, nitroglycerin, pantoprazole, and warfarin anticoagulant.    ROS     ROS: 10 point ROS neg other than the symptoms noted above in the HPI.    Physical Exam   VS: LMP  (LMP Unknown)    LMP  (LMP Unknown)   GENERAL: AXOX3, NAD, well dressed, answering questions appropriately, appears stated age.  HEENT: No exopthalmous, no proptosis, EOMI, no lig lag, no retraction  NECK: Thyroid normal in size, non tender, no nodules were palpated.  CV: RRR  LUNGS: CTAB  ABDOMEN:   NEUROLOGY: CN grossly intact, no tremors  PSYCH: normal affect and mood      LABS:  Calcium:  ENDO CALCIUM LABS-[facility] Latest Ref Rng & Units 9/12/2019   CALCIUM 8.5 - 10.1 mg/dL 9.8   CALCIUM IONIZED 4.4 - 5.2 mg/dL 5.3 (H)     ENDO CALCIUM LABS-[facility] Latest Ref Rng & Units 3/13/2019   CALCIUM 8.5 - 10.1 mg/dL 10.5 (H)   CALCIUM IONIZED 4.4 - 5.2 mg/dL 5.3 (H)     ENDO CALCIUM LABS-[facility] Latest Ref Rng & Units 10/25/2018 9/27/2018   CALCIUM 8.5 - 10.1 mg/dL 10.9 (H)      ENDO CALCIUM LABS-[facility] Latest Ref Rng & Units 9/26/2018   CALCIUM 8.5 - 10.1 mg/dL 10.1     ENDO CALCIUM LABS-[facility] Latest Ref Rng & Units 9/10/2018 8/17/2018   CALCIUM 8.5 - 10.1 mg/dL 11.0 (H) 9.8     ENDO CALCIUM LABS[facility] Latest Ref Rng & Units 6/22/2018 6/14/2018   CALCIUM 8.5 - 10.1 mg/dL 10.2 (H)       ENDO CALCIUM LABS-[facility] Latest Ref Rng & Units 6/5/2018   CALCIUM 8.5 - 10.1 mg/dL 10.6 (H)     PTH:  Component 9/12/2019   Parathyroid Hormone Intact      18 - 80 pg/mL 110 (H)     ENDO CALCIUM LABS[facility] Latest Ref Rng & Units 3/13/2019   PARATHYROID HORMONE INTACT 18 - 80 pg/mL 142 (H)     ENDO CALCIUM LABS[facility] Latest Ref Rng & Units 9/26/2018   PARATHYROID HORMONE INTACT 18 - 80 pg/mL 147 (H)     ENDO CALCIUM LABS-[facility] Latest Ref Rng & Units 6/22/2018   PARATHYROID HORMONE INTACT 18 - 80 pg/mL 97 (H)     Vitamin D:   Component 9/12/2019   Vitamin D Deficiency screening      20 - 75 ug/L 33     Lab Results   Component Value Date    VITDT 33 09/12/2019    VITDT 28 03/13/2019    VITDT 37 09/26/2018    VITDT 27 06/22/2018     TFTs:  Lab Results   Component Value Date    TSH 0.87 04/02/2018     ENDO CALCIUM LABS-[facility] Latest Ref Rng & Units 9/27/2018   CALCIUM URINE G/24 H 0.10 - 0.30 g/24 h 0.17   CALCIUM URINE G/G CR g/g Cr 0.30   CALCIUM URINE MG/DL mg/dL 9.3     DEXA 10/2017:  REFERENCE T-SCORES:       Normal                -1.0 and greater                                 Osteopenia         Between -1.0 and -2.5                                           Osteoporosis     -2.5 and less                                       RISK FACTORS:  Post-menopausal, Height loss of 1.75 inches, Follow-up osteopenia, Low body weight     CURRENT TREATMENT:  Calcium, Vitamin D      FINDINGS:               Lumbar Spine (L1-L2; due to significant degenerative changes at L3 and L4, these were omitted)      T-score:  -3.3               Left Femoral Neck                      T-score:  -2.9               Right Femoral Neck                    T-score:  -3.0                             Lumbar (L1-L4) BMD: 0.876                                                           Total Hip Mean BMD: 0.663                    LATERAL VERTEBRAL ASSESSMENT  Procedure:  Vertebral fracture assessment was performed in the lateral decubitus position using a  Lunar Prodigy  densitometer.  Indications for VFA: T-score of -1.0 or worse and age (female>69)  Confounding factors for VFA: Arthritis/degenerative disc disease, rib shadows.  The LVA scan is interpretable from T7 to L4.     VFA Findings: Using the semi-quantitative analysis of [employer] there was evidence of no spinal deformity  VFA Impression: Devyn Link has no vertebral fractures identified on the VFA.          IMPRESSION  Osteoporosis  Degenerative changes of the spine  Recommendations include ensuring adequate daily Calcium and Vitamin D intake  Follow up scan can be considered in three years.     Patient had a study performed previously, however the prior images are not available to compare to the current study.     Current NOF guidelines recommend treatment for patients with the following:  - Prior hip or vertebral fracture  - T-score -2.5 or below  - A 10 year risk of any major osteoporotic fracture >20% or 10 year risk of hip fracture >3%, as calculated using the FRAX calculator (www.shef.ac.uk/FRAX).       Based on these guidelines, treatment (in addition to calcium and vitamin D) is recommended for this patient, after ruling out other causes of osteoporosis/low bone density.  While this is meant as an aid to clinical decision-making, clinical judgment must still be used.     NM PARATHYROID PLANAR W/SPECT AND CT DUAL   11/20/2018      TECHNIQUE:  766uci I123 PO; 24.6mCi Tc99m Sestamibi injected in the  right hand @1442.     HISTORY:  Hypercalcemia. Hyperparathyroidism.     COMPARISON:   Nuclear Study: None.  Other Relevant Studies: None.     FINDINGS:  Planar subtraction images show a normal appearance of the  thyroid gland with no focal lesion to indicate a parathyroid adenoma.  Likewise, the fused SPECT-CT images show no focal lesion or activity  to suggest a parathyroid adenoma. Noncontrast CT scan shows no  evidence of a parathyroid adenoma. 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         IMPRESSION: No nuclear or CT evidence of a parathyroid adenoma.  All pertinent notes, labs, and images personally reviewed by me.     A/P  Ms.Xiang Link is a 73 year old here for the management of:    1. Hyperparathyroidism:    Recent labs stable.  Results reviewed in detail.  In the differential diagnosis of hypercalcemia, primary hyperparathyroidism is the most common cause. It is important to distinguish readily between primary hyperparathyroidism and other causes of hypercalcemia.    High calcium levels noted on and off since 2007.  More consistently high calcium level since 2017  Slightly elevated parathyroid hormone  Kidney function is in normal range  Vitamin D in normal range  Not on thaizide diuretic or lithium.  History of osteoporosis based on DEXA scan done 2017. On fosamax since 11/2018.    History of peptic ulcer.  Followed by GI. Gastrin slightly high- in the setting of PPI use.    Plan:  Discussed diagnosis, pathophysiology, management and treatment options of condition with pt.  Discussed close monitoring of labs at periodic interval vs neck exploration for parathyroid adenoma.  At this time she still prefers to continue to monitor.  Continue weekly Fosamax.  Plan to repeat labs in 6 months  F/u with Dr. Castro in 6 months with labs one week prior.    Discussed s/s of hypercalcemia and recommend adequate hydration.       2.  Osteoporosis:  DEXA 2017 c/w osteoporosis  On fosamax since 11/2018.  Tolerating well.  Plan:  Discussed diagnosis, pathophysiology, management and treatment options of condition with pt.  Continue Fosamax 70 mg ONCE a week (started 11/21/2018)  The pt was advised to    Maintain an adequate calcium and vitamin D intake and to supplement vitamin D if needed to maintain serum levels of 25 hydroxy D  (25 OH D) between 30-60 ng/ml.    Limit alcohol intake to no more than 2 servings per day.    Limit caffeine intake.    Maintain an active lifestyle including weight-bearing exercises for at least 30 mins daily.    Take measures to reduce the risk of falling.  Discussed indications, risks and benefits of all medications prescribed, and answered questions to patient's satisfaction.  Instructions on Fosamax use and side effects - particularly esophageal adverse events - are carefully reviewed with her. This drug must be taken upon arising for the day on an empty stomach, with a large 6-8 ounce glass of water; she must remain NPO in the upright position for at least 30 minutes afterwards and until after the first food of the day. If esophageal irritation is noted, she will stop the drug and call my office.  Treatment with bisphosphonate therapy will decrease fracture risk 50-70%.   There is risk of osteonecrosis of the jaw in patients using bisphosphonates is approximately 1/1700-1/100,000, with development most likely related to invasive dental procedures. If an invasive dental procedure was necessary, preferably stop the bisphosphonate approximately 3 months prior to reduce the risk. Let your dentist know that you are on this medication.  The data available at this time suggests that there is probably a small increase risk of atypical (nontraumatic) subtrochanteric fractures of the femur in patients on bisphosphonate therapy compared to those not on it. One large study suggested that for every 100 fractures prevented with bisphosphonate therapy, less than one femur fracture will occur. Other studies suggest one episode per 2,500 patient years. Patient should call with leg pain.      The patient indicates understanding of these issues and agrees with the plan.      Guidelines for parathyroid surgery in asymptomatic primary hyperparathyroidism:    Serum Calcium >1.0 mg/dl (0.25 mmol/L) above normal   Creatinine  Clearance (calculated) Below 60m1/min /1.73 m2)   Bone Mineral Density T score < -2.5 SD at spine, hip (total or femoral neck) or radius (distal 1/3 site) or presence of fragility fracture   Age Age < 50 years        PARATHYROID GLAND IMAGING  Pre- operative imaging is of value prior to surgery in the localization of abnormal parathyroid tissue . The diagnosis of PHPT is based on the biochemical findings and is not affected by the results of the imaging studies. Sestamibi imaging is of value in localizing abnormal parathyroid tissue. The sensitivity and specificity varies among institutions.    More than 50% of the time spent with Ms. Link on counseling / coordinating her care. Spent 45 min.    Follow-up:  6 months with Dr. Castro, labs one week prior.      Afua Guillory NP  Endocrinology   [facility]  September 26, 2018  CC: Prisca Lucero MD

## 2019-10-04 NOTE — PATIENT INSTRUCTIONS
Continue Fosamax 70 mg weekly.    Follow up with Dr. Castro in 6 months.  Please make a lab appointment one week before your appointment with Dr. Castro.      ENDO CALCIUM LABS-[facility] Latest Ref Rng & Units 9/12/2019   CALCIUM 8.5 - 10.1 mg/dL 9.8   CALCIUM IONIZED 4.4 - 5.2 mg/dL 5.3 (H)   PHOSPHOROUS 2.5 - 4.5 mg/dL 2.3 (L)   MAGNESIUM 1.6 - 2.3 mg/dL 2.3   ALBUMIN 3.4 - 5.0 g/dL    BUN 7 - 30 mg/dL    CREATININE 0.52 - 1.04 mg/dL 0.72   PARATHYROID HORMONE INTACT 18 - 80 pg/mL 110 (H)   ALKPHOS 40 - 150 U/L    VITAMIN D DEFICIENCY SCREENING 20 - 75 ug/L 33   PROTEIN, TOTAL 6.8 - 8.8 g/dL    CALCIUM URINE G/24 H 0.10 - 0.30 g/24 h    CALCIUM URINE G/G CR g/g Cr    CALCIUM URINE MG/DL mg/dL      ENDO CALCIUM LABS-[facility] Latest Ref Rng & Units 3/13/2019   CALCIUM 8.5 - 10.1 mg/dL 10.5 (H)   CALCIUM IONIZED 4.4 - 5.2 mg/dL 5.3 (H)   PHOSPHOROUS 2.5 - 4.5 mg/dL 2.6   MAGNESIUM 1.6 - 2.3 mg/dL 2.2   ALBUMIN 3.4 - 5.0 g/dL    BUN 7 - 30 mg/dL    CREATININE 0.52 - 1.04 mg/dL 0.64   PARATHYROID HORMONE INTACT 18 - 80 pg/mL 142 (H)   ALKPHOS 40 - 150 U/L    VITAMIN D DEFICIENCY SCREENING 20 - 75 ug/L 28   PROTEIN, TOTAL 6.8 - 8.8 g/dL    CALCIUM URINE G/24 H 0.10 - 0.30 g/24 h    CALCIUM URINE G/G CR g/g Cr    CALCIUM URINE MG/DL mg/dL      ENDO CALCIUM LABS[facility] Latest Ref Rng & Units 10/25/2018   CALCIUM 8.5 - 10.1 mg/dL 10.9 (H)   CALCIUM IONIZED 4.4 - 5.2 mg/dL    PHOSPHOROUS 2.5 - 4.5 mg/dL    MAGNESIUM 1.6 - 2.3 mg/dL    ALBUMIN 3.4 - 5.0 g/dL 4.3   BUN 7 - 30 mg/dL 16   CREATININE 0.52 - 1.04 mg/dL 0.62   PARATHYROID HORMONE INTACT 18 - 80 pg/mL    ALKPHOS 40 - 150 U/L 122   VITAMIN D DEFICIENCY SCREENING 20 - 75 ug/L    PROTEIN, TOTAL 6.8 - 8.8 g/dL 8.1   CALCIUM URINE G/24 H 0.10 - 0.30 g/24 h    CALCIUM URINE G/G CR g/g Cr    CALCIUM URINE MG/DL mg/dL      ENDO CALCIUM LABS[facility] Latest Ref Rng & Units 9/27/2018   CALCIUM 8.5 - 10.1 mg/dL    CALCIUM IONIZED 4.4 - 5.2 mg/dL    PHOSPHOROUS 2.5 - 4.5 mg/dL     MAGNESIUM 1.6 - 2.3 mg/dL    ALBUMIN 3.4 - 5.0 g/dL    BUN 7 - 30 mg/dL    CREATININE 0.52 - 1.04 mg/dL    PARATHYROID HORMONE INTACT 18 - 80 pg/mL    ALKPHOS 40 - 150 U/L    VITAMIN D DEFICIENCY SCREENING 20 - 75 ug/L    PROTEIN, TOTAL 6.8 - 8.8 g/dL    CALCIUM URINE G/24 H 0.10 - 0.30 g/24 h 0.17   CALCIUM URINE G/G CR g/g Cr 0.30   CALCIUM URINE MG/DL mg/dL 9.3     ENDO CALCIUM LABS-[facility] Latest Ref Rng & Units 9/26/2018   CALCIUM 8.5 - 10.1 mg/dL 10.1   CALCIUM IONIZED 4.4 - 5.2 mg/dL 5.5 (H)   PHOSPHOROUS 2.5 - 4.5 mg/dL 2.3 (L)   MAGNESIUM 1.6 - 2.3 mg/dL 2.2   ALBUMIN 3.4 - 5.0 g/dL    BUN 7 - 30 mg/dL    CREATININE 0.52 - 1.04 mg/dL 0.58   PARATHYROID HORMONE INTACT 18 - 80 pg/mL 147 (H)   ALKPHOS 40 - 150 U/L    VITAMIN D DEFICIENCY SCREENING 20 - 75 ug/L 37   PROTEIN, TOTAL 6.8 - 8.8 g/dL    CALCIUM URINE G/24 H 0.10 - 0.30 g/24 h    CALCIUM URINE G/G CR g/g Cr    CALCIUM URINE MG/DL mg/dL      ENDO CALCIUM LABS[facility] Latest Ref Rng & Units 9/10/2018   CALCIUM 8.5 - 10.1 mg/dL 11.0 (H)   CALCIUM IONIZED 4.4 - 5.2 mg/dL    PHOSPHOROUS 2.5 - 4.5 mg/dL    MAGNESIUM 1.6 - 2.3 mg/dL    ALBUMIN 3.4 - 5.0 g/dL    BUN 7 - 30 mg/dL 17   CREATININE 0.52 - 1.04 mg/dL 0.66   PARATHYROID HORMONE INTACT 18 - 80 pg/mL    ALKPHOS 40 - 150 U/L    VITAMIN D DEFICIENCY SCREENING 20 - 75 ug/L    PROTEIN, TOTAL 6.8 - 8.8 g/dL    CALCIUM URINE G/24 H 0.10 - 0.30 g/24 h    CALCIUM URINE G/G CR g/g Cr    CALCIUM URINE MG/DL mg/dL      ENDO CALCIUM LABS-[facility] Latest Ref Rng & Units 8/17/2018   CALCIUM 8.5 - 10.1 mg/dL 9.8   CALCIUM IONIZED 4.4 - 5.2 mg/dL    PHOSPHOROUS 2.5 - 4.5 mg/dL    MAGNESIUM 1.6 - 2.3 mg/dL    ALBUMIN 3.4 - 5.0 g/dL    BUN 7 - 30 mg/dL 20   CREATININE 0.52 - 1.04 mg/dL 0.66   PARATHYROID HORMONE INTACT 18 - 80 pg/mL    ALKPHOS 40 - 150 U/L    VITAMIN D DEFICIENCY SCREENING 20 - 75 ug/L    PROTEIN, TOTAL 6.8 - 8.8 g/dL    CALCIUM URINE G/24 H 0.10 - 0.30 g/24 h    CALCIUM URINE G/G CR g/g Cr    CALCIUM URINE  MG/DL mg/dL      ENDO CALCIUM LABS-[facility] Latest Ref Rng & Units 6/22/2018   CALCIUM 8.5 - 10.1 mg/dL 10.2 (H)   CALCIUM IONIZED 4.4 - 5.2 mg/dL    PHOSPHOROUS 2.5 - 4.5 mg/dL 2.9   MAGNESIUM 1.6 - 2.3 mg/dL    ALBUMIN 3.4 - 5.0 g/dL 3.3 (L)   BUN 7 - 30 mg/dL 19   CREATININE 0.52 - 1.04 mg/dL 0.93   PARATHYROID HORMONE INTACT 18 - 80 pg/mL 97 (H)   ALKPHOS 40 - 150 U/L    VITAMIN D DEFICIENCY SCREENING 20 - 75 ug/L 27   PROTEIN, TOTAL 6.8 - 8.8 g/dL    CALCIUM URINE G/24 H 0.10 - 0.30 g/24 h    CALCIUM URINE G/G CR g/g Cr    CALCIUM URINE MG/DL mg/dL      ENDO CALCIUM LABS[facility] Latest Ref Rng & Units 6/14/2018 6/5/2018   CALCIUM 8.5 - 10.1 mg/dL  10.6 (H)   CALCIUM IONIZED 4.4 - 5.2 mg/dL     PHOSPHOROUS 2.5 - 4.5 mg/dL  2.2 (L)   MAGNESIUM 1.6 - 2.3 mg/dL     ALBUMIN 3.4 - 5.0 g/dL  4.1   BUN 7 - 30 mg/dL  17   CREATININE 0.52 - 1.04 mg/dL 0.80 0.64   PARATHYROID HORMONE INTACT 18 - 80 pg/mL     ALKPHOS 40 - 150 U/L     VITAMIN D DEFICIENCY SCREENING 20 - 75 ug/L     PROTEIN, TOTAL 6.8 - 8.8 g/dL     CALCIUM URINE G/24 H 0.10 - 0.30 g/24 h     CALCIUM URINE G/G CR g/g Cr     CALCIUM URINE MG/DL mg/dL       ENDO CALCIUM LABS[facility] Latest Ref Rng & Units 5/10/2018 9/1/2017   CALCIUM 8.5 - 10.1 mg/dL 10.7 (H) 10.4 (H)   CALCIUM IONIZED 4.4 - 5.2 mg/dL     PHOSPHOROUS 2.5 - 4.5 mg/dL     MAGNESIUM 1.6 - 2.3 mg/dL     ALBUMIN 3.4 - 5.0 g/dL  4.1   BUN 7 - 30 mg/dL 26 22   CREATININE 0.52 - 1.04 mg/dL 0.82 0.79   PARATHYROID HORMONE INTACT 18 - 80 pg/mL     ALKPHOS 40 - 150 U/L  109   VITAMIN D DEFICIENCY SCREENING 20 - 75 ug/L     PROTEIN, TOTAL 6.8 - 8.8 g/dL  7.7   CALCIUM URINE G/24 H 0.10 - 0.30 g/24 h     CALCIUM URINE G/G CR g/g Cr     CALCIUM URINE MG/DL mg/dL       ENDO CALCIUM LABS-[facility] Latest Ref Rng & Units 3/16/2016 3/2/2016   CALCIUM 8.5 - 10.1 mg/dL     CALCIUM IONIZED 4.4 - 5.2 mg/dL     PHOSPHOROUS 2.5 - 4.5 mg/dL     MAGNESIUM 1.6 - 2.3 mg/dL     ALBUMIN 3.4 - 5.0 g/dL     BUN 7 - 30 mg/dL     CREATININE 0.52  - 1.04 mg/dL 0.79 0.84   PARATHYROID HORMONE INTACT 18 - 80 pg/mL     ALKPHOS 40 - 150 U/L     VITAMIN D DEFICIENCY SCREENING 20 - 75 ug/L     PROTEIN, TOTAL 6.8 - 8.8 g/dL     CALCIUM URINE G/24 H 0.10 - 0.30 g/24 h     CALCIUM URINE G/G CR g/g Cr     CALCIUM URINE MG/DL mg/dL       ENDO CALCIUM LABS-[facility] Latest Ref Rng & Units 9/22/2015 8/6/2015   CALCIUM 8.5 - 10.1 mg/dL  9.5   CALCIUM IONIZED 4.4 - 5.2 mg/dL     PHOSPHOROUS 2.5 - 4.5 mg/dL     MAGNESIUM 1.6 - 2.3 mg/dL     ALBUMIN 3.4 - 5.0 g/dL  4.1   BUN 7 - 30 mg/dL  24   CREATININE 0.52 - 1.04 mg/dL 0.83 0.88   PARATHYROID HORMONE INTACT 18 - 80 pg/mL     ALKPHOS 40 - 150 U/L  101   VITAMIN D DEFICIENCY SCREENING 20 - 75 ug/L     PROTEIN, TOTAL 6.8 - 8.8 g/dL  7.4   CALCIUM URINE G/24 H 0.10 - 0.30 g/24 h     CALCIUM URINE G/G CR g/g Cr     CALCIUM URINE MG/DL mg/dL

## 2019-10-11 ENCOUNTER — HOSPITAL ENCOUNTER (OUTPATIENT)
Dept: MAMMOGRAPHY | Facility: CLINIC | Age: 74
Discharge: HOME | End: 2019-10-11
Attending: FAMILY MEDICINE | Admitting: FAMILY MEDICINE
Payer: COMMERCIAL

## 2019-10-11 DIAGNOSIS — Z12.31 VISIT FOR SCREENING MAMMOGRAM: ICD-10-CM

## 2019-10-11 PROCEDURE — 77063 BREAST TOMOSYNTHESIS BI: CPT

## 2019-10-18 ENCOUNTER — TRANSFERRED RECORDS (OUTPATIENT)
Dept: HEALTH INFORMATION MANAGEMENT | Facility: CLINIC | Age: 74
End: 2019-10-18

## 2019-11-14 DIAGNOSIS — Z87.898 HISTORY OF ULCER DISEASE: ICD-10-CM

## 2019-11-15 RX ORDER — PANTOPRAZOLE SODIUM 40 MG/1
TABLET, DELAYED RELEASE ORAL
Qty: 30 TABLET | Refills: 1 | Status: SHIPPED | OUTPATIENT
Start: 2019-11-15 | End: 2020-01-14

## 2019-11-15 NOTE — TELEPHONE ENCOUNTER
60 day supply given.  Patient is due for yearly physical and lab work.  Please call and assist with scheduling appointment prior to next refill   Alyson EDDY - Registered Nurse  [facility]  Acute and Diagnostic Services

## 2019-11-15 NOTE — TELEPHONE ENCOUNTER
"Requested Prescriptions   Pending Prescriptions Disp Refills     pantoprazole (PROTONIX) 40 MG EC tablet [Pharmacy Med Name: PANTOPRAZOLE SODIUM 40 MG Tablet Delayed Release] 90 tablet 0     Sig: TAKE 1 TABLET EVERY DAY   Last Written Prescription Date:  9/27/19  Last Fill Quantity: 90,  # refills: 0   Last office visit: 10/25/2018 with prescribing provider:  Prisca Lucero MD   Future Office Visit:   Next 5 appointments (look out 90 days)    Jan 08, 2020 11:30 AM CST  Return Visit with Keisha Castro MD  [facility] ([facility] [address]  [facility] [address]  [phone]   Jan 14, 2020 10:30 AM CST  PHYSICAL with Prisca Lucero MD  [facility] ([address] [address]  [phone]             PPI Protocol Failed - 11/14/2019  7:50 PM        Failed - No diagnosis of osteoporosis on record        Failed - Recent (12 mo) or future (30 days) visit within the authorizing provider's specialty     Patient has had an office visit with the authorizing provider or a provider within the authorizing providers department within the previous 12 mos or has a future within next 30 days. See \"Patient Info\" tab in inbasket, or \"Choose Columns\" in Meds & Orders section of the refill encounter.              Passed - Not on Clopidogrel (unless Pantoprazole ordered)        Passed - Medication is active on med list        Passed - Patient is age 18 or older        Passed - No active pregnacy on record        Passed - No positive pregnancy test in past 12 months         "

## 2019-12-15 ENCOUNTER — HEALTH MAINTENANCE LETTER (OUTPATIENT)
Age: 74
End: 2019-12-15

## 2020-01-02 DIAGNOSIS — M81.8 OTHER OSTEOPOROSIS WITHOUT CURRENT PATHOLOGICAL FRACTURE: ICD-10-CM

## 2020-01-02 DIAGNOSIS — E21.3 HYPERPARATHYROIDISM (H): ICD-10-CM

## 2020-01-02 DIAGNOSIS — E83.52 HYPERCALCEMIA: ICD-10-CM

## 2020-01-02 LAB
CA-I SERPL ISE-MCNC: 5.2 MG/DL (ref 4.4–5.2)
PTH-INTACT SERPL-MCNC: 105 PG/ML (ref 18–80)

## 2020-01-02 PROCEDURE — 80069 RENAL FUNCTION PANEL: CPT | Performed by: CLINICAL NURSE SPECIALIST

## 2020-01-02 PROCEDURE — 36415 COLL VENOUS BLD VENIPUNCTURE: CPT | Performed by: CLINICAL NURSE SPECIALIST

## 2020-01-02 PROCEDURE — 83970 ASSAY OF PARATHORMONE: CPT | Performed by: CLINICAL NURSE SPECIALIST

## 2020-01-02 PROCEDURE — 82306 VITAMIN D 25 HYDROXY: CPT | Performed by: CLINICAL NURSE SPECIALIST

## 2020-01-02 PROCEDURE — 83735 ASSAY OF MAGNESIUM: CPT | Performed by: CLINICAL NURSE SPECIALIST

## 2020-01-02 PROCEDURE — 82330 ASSAY OF CALCIUM: CPT | Performed by: CLINICAL NURSE SPECIALIST

## 2020-01-03 LAB
ALBUMIN SERPL-MCNC: 4 G/DL (ref 3.4–5)
ANION GAP SERPL CALCULATED.3IONS-SCNC: 5 MMOL/L (ref 3–14)
BUN SERPL-MCNC: 16 MG/DL (ref 7–30)
CALCIUM SERPL-MCNC: 9.5 MG/DL (ref 8.5–10.1)
CHLORIDE SERPL-SCNC: 106 MMOL/L (ref 94–109)
CO2 SERPL-SCNC: 26 MMOL/L (ref 20–32)
CREAT SERPL-MCNC: 0.63 MG/DL (ref 0.52–1.04)
DEPRECATED CALCIDIOL+CALCIFEROL SERPL-MC: 30 UG/L (ref 20–75)
GFR SERPL CREATININE-BSD FRML MDRD: 87 ML/MIN/{1.73_M2}
GLUCOSE SERPL-MCNC: 95 MG/DL (ref 70–99)
MAGNESIUM SERPL-MCNC: 2.2 MG/DL (ref 1.6–2.3)
PHOSPHATE SERPL-MCNC: 2.6 MG/DL (ref 2.5–4.5)
POTASSIUM SERPL-SCNC: 3.7 MMOL/L (ref 3.4–5.3)
SODIUM SERPL-SCNC: 137 MMOL/L (ref 133–144)

## 2020-01-08 ENCOUNTER — OFFICE VISIT (OUTPATIENT)
Dept: ENDOCRINOLOGY | Facility: CLINIC | Age: 75
End: 2020-01-08
Payer: COMMERCIAL

## 2020-01-08 VITALS
SYSTOLIC BLOOD PRESSURE: 138 MMHG | BODY MASS INDEX: 17.9 KG/M2 | OXYGEN SATURATION: 99 % | WEIGHT: 111.4 LBS | HEIGHT: 66 IN | TEMPERATURE: 98.2 F | RESPIRATION RATE: 16 BRPM | HEART RATE: 79 BPM | DIASTOLIC BLOOD PRESSURE: 80 MMHG

## 2020-01-08 DIAGNOSIS — M81.8 OTHER OSTEOPOROSIS WITHOUT CURRENT PATHOLOGICAL FRACTURE: ICD-10-CM

## 2020-01-08 DIAGNOSIS — E83.52 HYPERCALCEMIA: Primary | ICD-10-CM

## 2020-01-08 DIAGNOSIS — E21.3 HYPERPARATHYROIDISM (H): ICD-10-CM

## 2020-01-08 PROCEDURE — 99214 OFFICE O/P EST MOD 30 MIN: CPT | Performed by: INTERNAL MEDICINE

## 2020-01-08 ASSESSMENT — MIFFLIN-ST. JEOR: SCORE: 1012.06

## 2020-01-08 NOTE — NURSING NOTE
ENDOCRINOLOGY INTAKE FORM    Patient Name:  Devyn Link  :  1945    Is patient Diabetic?   No  Does patient have non-diabetic or other endocrine issues?  Yes: hypercalcemia, hyperPTH, osteoporosis, abnormal glucose, hyperlipidemia    Vitals: LMP  (LMP Unknown)   BMI= There is no height or weight on file to calculate BMI.    Smoking and Alcohol use:  Social History     Tobacco Use     Smoking status: Never Smoker     Smokeless tobacco: Never Used   Substance Use Topics     Alcohol use: No     Drug use: No     Dominique Perez, CHRISTOPH  [facility]  [place]

## 2020-01-08 NOTE — LETTER
1/8/2020         RE: Devyn Link  [id number] [facility] [id number]        Dear Colleague,    Thank you for referring your patient, Devyn Link, to the [facility]. Please see a copy of my visit note below.    Name: Devyn Link  Seen for f/u of hyperPTH. Seen with .  Last seen10/2019 by Afua Guillory.  HPI:  Devyn Link is a 74 year old female who presents for the management of hyperPTH.  High calcium has been noted since 2007 on and off.  Other past medical history include CAD, history of aortic valve replacement, Hypertension, mitral valve replacement, pacemaker in place, prediabetes, osteoporosis without current pathological fracture.  Was on Amiodarone in past and now off > 6 months. Was on it for about 2-3 years.  DEXA 2017 consistent with osteoporosis. on fosamax since 11/2018.    Repeat labs showed high  Ca, high PTH, normal creatinine, 24 hr urine calcium.  Parathyroid Scan did not identify adenoma.  12/2019 labs showing normal calcium, kidney function and vitamin D levels.  Parathyroid hormones slightly high but improving.    Energy levels vary but no change since last seen.  Arms and back is hurting.  Sometimes HA.    History of Cancer:No  Thiazide Diuretic:No  Lithium use:No  Family History of pituitary adenoma, pancreas tumors, Zollinger-Diaz syndrome, pheochromocytoma. No  Kidney stones:No  Fractures: No. DEXA c/w osteoporosis- on fosamax since 11/2018.  Abdominal Pain:Yes (Please explain): sometimes. H/o peptic ulcer. Is followed up by GI.  Cramps: No  Constipation: no   Muscle Aches or pains: No  Muscle twitches: No  Nausea and vomiting: no  Confusion Lethargy and fatigue: No confusion or lethargy, fatigue off and on   ON medications like Lithium/ HCTZ: no  Average Daily Calcium intake: dairy- not much  Ca and Vit D supplementation: calcium 500 mg and vit D- but not sure about the dose.  PMH/PSH:  Past Medical History:   Diagnosis Date     CAD (coronary artery  disease) 12/13/2011    Followed every 6 months by Dr. Torre, [facility]      Chronic atrial fibrillation 4/11/2013     Hyperlipidemia LDL goal <70 12/13/2011     Hypertension      Hypertension goal BP (blood pressure) < 140/90 12/14/2011     Myocardial infarction (H)      Pacemaker      Past Surgical History:   Procedure Laterality Date     C REPLACEMENT OF MITRAL VALVE  1994    Done at [facility]     COLONOSCOPY  06/13/2018    Dr. Alvarez [facility]     COLONOSCOPY N/A 6/13/2018    Procedure: COLONOSCOPY;;  Surgeon: Rosario Alvarez MD;  Location:  GI     ESOPHAGOSCOPY, GASTROSCOPY, DUODENOSCOPY (EGD), COMBINED  06/13/2018    Dr. Alvarez [facility]     ESOPHAGOSCOPY, GASTROSCOPY, DUODENOSCOPY (EGD), COMBINED N/A 6/13/2018    Procedure: COMBINED ESOPHAGOSCOPY, GASTROSCOPY, DUODENOSCOPY (EGD), BIOPSY SINGLE OR MULTIPLE;  COMBINED ESOPHAGOSCOPY, GASTROSCOPY, DUODENOSCOPY (EGD) with biopsies COLONOSCOPY   ;  Surgeon: Rosario Alvarez MD;  Location:  GI     HEART CATH DRUG ELUTING STENT PLACEMENT  2004/2007    seeing Cardiologist at [facility]     SURGICAL HISTORY OF -   ~ 1995    mechanical aortic valve     SURGICAL HISTORY OF -       pacemaker     SURGICAL HISTORY OF -       mechanical mitral valve     Family Hx:  Family History   Problem Relation Age of Onset     Hypertension Mother      Cancer Mother         lung     Heart Disease Mother      Cerebrovascular Disease Mother      Hypertension Sister      Colon Cancer No family hx of             Social Hx:  Social History     Socioeconomic History     Marital status:      Spouse name: Not on file     Number of children: Not on file     Years of education: Not on file     Highest education level: Not on file   Occupational History     Not on file   Social Needs     Financial resource strain: Not on file     Food insecurity:     Worry: Not on file     Inability: Not on file     Transportation needs:     Medical: Not on file     Non-medical: Not on file   Tobacco  "Use     Smoking status: Never Smoker     Smokeless tobacco: Never Used   Substance and Sexual Activity     Alcohol use: No     Drug use: No     Sexual activity: Yes     Partners: Male   Lifestyle     Physical activity:     Days per week: Not on file     Minutes per session: Not on file     Stress: Not on file   Relationships     Social connections:     Talks on phone: Not on file     Gets together: Not on file     Attends Orthodox service: Not on file     Active member of club or organization: Not on file     Attends meetings of clubs or organizations: Not on file     Relationship status: Not on file     Intimate partner violence:     Fear of current or ex partner: Not on file     Emotionally abused: Not on file     Physically abused: Not on file     Forced sexual activity: Not on file   Other Topics Concern     Parent/sibling w/ CABG, MI or angioplasty before 65F 55M? Yes   Social History Narrative     Not on file          MEDICATIONS:  has a current medication list which includes the following prescription(s): alendronate, aspirin, atorvastatin, carvedilol, ferrous sulfate, furosemide, losartan, nitroglycerin, pantoprazole, and warfarin anticoagulant.    ROS     ROS: 10 point ROS neg other than the symptoms noted above in the HPI.    Physical Exam   VS: /80 (BP Location: Left arm, Patient Position: Chair, Cuff Size: Adult Regular)   Pulse 79   Temp 98.2  F (36.8  C) (Oral)   Resp 16   Ht 1.676 m (5' 6\")   Wt 50.5 kg (111 lb 6.4 oz)   LMP  (LMP Unknown)   SpO2 99%   Breastfeeding No   BMI 17.98 kg/m     /80 (BP Location: Left arm, Patient Position: Chair, Cuff Size: Adult Regular)   Pulse 79   Temp 98.2  F (36.8  C) (Oral)   Resp 16   Ht 1.676 m (5' 6\")   Wt 50.5 kg (111 lb 6.4 oz)   LMP  (LMP Unknown)   SpO2 99%   Breastfeeding No   BMI 17.98 kg/m     GENERAL: AXOX3, NAD, well dressed, answering questions appropriately, appears stated age.  HEENT: No exopthalmous, no proptosis, EOMI, " no lig lag, no retraction  NECK: Thyroid normal in size, non tender, no nodules were palpated.  CV: RRR  LUNGS: CTAB  ABDOMEN:   NEUROLOGY: CN grossly intact, no tremors  PSYCH: normal affect and mood      LABS:  ENDO CALCIUM LABS-UMP Latest Ref Rng & Units 9/27/2018   CALCIUM URINE G/24 H 0.10 - 0.30 g/24 h 0.17   CALCIUM URINE G/G CR g/g Cr 0.30   CALCIUM URINE MG/DL mg/dL 9.3     Calcium:  ENDO CALCIUM LABS-UMP Latest Ref Rng & Units 1/2/2020   CALCIUM 8.5 - 10.1 mg/dL 9.5     ENDO CALCIUM LABS-UMP Latest Ref Rng & Units 9/12/2019   CALCIUM 8.5 - 10.1 mg/dL 9.8   CALCIUM IONIZED 4.4 - 5.2 mg/dL 5.3 (H)     ENDO CALCIUM LABS-UMP Latest Ref Rng & Units 3/13/2019   CALCIUM 8.5 - 10.1 mg/dL 10.5 (H)   CALCIUM IONIZED 4.4 - 5.2 mg/dL 5.3 (H)     ENDO CALCIUM LABS-UMP Latest Ref Rng & Units 10/25/2018 9/27/2018   CALCIUM 8.5 - 10.1 mg/dL 10.9 (H)      ENDO CALCIUM LABS-UMP Latest Ref Rng & Units 9/26/2018   CALCIUM 8.5 - 10.1 mg/dL 10.1     ENDO CALCIUM LABS-UMP Latest Ref Rng & Units 9/10/2018 8/17/2018   CALCIUM 8.5 - 10.1 mg/dL 11.0 (H) 9.8     ENDO CALCIUM LABS-UMP Latest Ref Rng & Units 6/22/2018 6/14/2018   CALCIUM 8.5 - 10.1 mg/dL 10.2 (H)      ENDO CALCIUM LABS-UMP Latest Ref Rng & Units 6/5/2018   CALCIUM 8.5 - 10.1 mg/dL 10.6 (H)     PTH:  ENDO CALCIUM LABS-UMP Latest Ref Rng & Units 1/2/2020   PARATHYROID HORMONE INTACT 18 - 80 pg/mL 105 (H)     Component 9/12/2019   Parathyroid Hormone Intact      18 - 80 pg/mL 110 (H)     ENDO CALCIUM LABS-UMP Latest Ref Rng & Units 3/13/2019   PARATHYROID HORMONE INTACT 18 - 80 pg/mL 142 (H)     ENDO CALCIUM LABS-UMP Latest Ref Rng & Units 9/26/2018   PARATHYROID HORMONE INTACT 18 - 80 pg/mL 147 (H)     ENDO CALCIUM LABS-UMP Latest Ref Rng & Units 6/22/2018   PARATHYROID HORMONE INTACT 18 - 80 pg/mL 97 (H)     Vitamin D:   Component 9/12/2019   Vitamin D Deficiency screening      20 - 75 ug/L 33     Lab Results   Component Value Date    VITDT 30 01/02/2020    VITDT 33  09/12/2019    VITDT 28 03/13/2019    VITDT 37 09/26/2018    VITDT 27 06/22/2018     TFTs:  Lab Results   Component Value Date    TSH 0.87 04/02/2018     DEXA 10/2017:  REFERENCE T-SCORES:       Normal                -1.0 and greater                                 Osteopenia         Between -1.0 and -2.5                                           Osteoporosis     -2.5 and less                                       RISK FACTORS:  Post-menopausal, Height loss of 1.75 inches, Follow-up osteopenia, Low body weight     CURRENT TREATMENT:  Calcium, Vitamin D      FINDINGS:               Lumbar Spine (L1-L2; due to significant degenerative changes at L3 and L4, these were omitted)      T-score:  -3.3               Left Femoral Neck                      T-score:  -2.9               Right Femoral Neck                    T-score:  -3.0                             Lumbar (L1-L4) BMD: 0.876                                                           Total Hip Mean BMD: 0.663                    LATERAL VERTEBRAL ASSESSMENT  Procedure:  Vertebral fracture assessment was performed in the lateral decubitus position using a Lunar Prodigy  densitometer.  Indications for VFA: T-score of -1.0 or worse and age (female>69)  Confounding factors for VFA: Arthritis/degenerative disc disease, rib shadows.  The LVA scan is interpretable from T7 to L4.     VFA Findings: Using the semi-quantitative analysis of [employer] there was evidence of no spinal deformity  VFA Impression: Devyn Link has no vertebral fractures identified on the VFA.          IMPRESSION  Osteoporosis  Degenerative changes of the spine  Recommendations include ensuring adequate daily Calcium and Vitamin D intake  Follow up scan can be considered in three years.     Patient had a study performed previously, however the prior images are not available to compare to the current study.     Current NOF guidelines recommend treatment for patients with the following:  - Prior hip or  vertebral fracture  - T-score -2.5 or below  - A 10 year risk of any major osteoporotic fracture >20% or 10 year risk of hip fracture >3%, as calculated using the FRAX calculator (www.shef.ac.uk/FRAX).       Based on these guidelines, treatment (in addition to calcium and vitamin D) is recommended for this patient, after ruling out other causes of osteoporosis/low bone density.  While this is meant as an aid to clinical decision-making, clinical judgment must still be used.     NM PARATHYROID PLANAR W/SPECT AND CT DUAL   11/20/2018      TECHNIQUE:  766uci I123 PO; 24.6mCi Tc99m Sestamibi injected in the  right hand @1442.     HISTORY:  Hypercalcemia. Hyperparathyroidism.     COMPARISON:   Nuclear Study: None.  Other Relevant Studies: None.     FINDINGS:  Planar subtraction images show a normal appearance of the  thyroid gland with no focal lesion to indicate a parathyroid adenoma.  Likewise, the fused SPECT-CT images show no focal lesion or activity  to suggest a parathyroid adenoma. Noncontrast CT scan shows no  evidence of a parathyroid adenoma. 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         IMPRESSION: No nuclear or CT evidence of a parathyroid adenoma.  All pertinent notes, labs, and images personally reviewed by me.     A/P  Ms.Xiang Link is a 74 year old here for the management of:    1. Hyperparathyroidism:    Recent labs stable.  Results reviewed in detail.  In the differential diagnosis of hypercalcemia, primary hyperparathyroidism is the most common cause. It is important to distinguish readily between primary hyperparathyroidism and other causes of hypercalcemia.    High calcium levels noted on and off since 2007.  More consistently high calcium level since 2017  Slightly elevated parathyroid hormone  Kidney  function is in normal range  Vitamin D in normal range  Not on thaizide diuretic or lithium.  History of osteoporosis based on DEXA scan done 2017. On fosamax since 11/2018.    History of peptic ulcer.  Followed by GI. Gastrin slightly high- in the setting of PPI use.  12/2019 labs showing normal calcium, kidney function and vitamin D levels.  Parathyroid hormones slightly high but improving.  Plan:  Discussed diagnosis, pathophysiology, management and treatment options of condition with pt.  Discussed close monitoring of labs at periodic interval vs neck exploration for parathyroid adenoma ( in the setting of osteoporosis).  At this time she still prefers to continue to monitor.  Plan to repeat labs in 6 months  F/u with Dr. Castro in 6 months with labs one week prior.    Discussed s/s of hypercalcemia and recommend adequate hydration.       2.  Osteoporosis:  DEXA 2017 c/w osteoporosis.  On fosamax since 11/2018.  Tolerating well.  History of peptic ulcer.  Followed by GI.   Plan:  Discussed diagnosis, pathophysiology, management and treatment options of condition with pt.  Continue Fosamax 70 mg ONCE a week (started 11/21/2018)  Consider bone density scan in 11/2020.    The pt was advised to    Maintain an adequate calcium and vitamin D intake and to supplement vitamin D if needed to maintain serum levels of 25 hydroxy D (25 OH D) between 30-60 ng/ml.    Limit alcohol intake to no more than 2 servings per day.    Limit caffeine intake.    Maintain an active lifestyle including weight-bearing exercises for at least 30 mins daily.    Take measures to reduce the risk of falling.  Discussed indications, risks and benefits of all medications prescribed, and answered questions to patient's satisfaction.  Instructions on Fosamax use and side effects - particularly esophageal adverse events - are carefully reviewed with her. This drug must be taken upon arising for the day on an empty stomach, with a large 6-8 ounce  glass of water; she must remain NPO in the upright position for at least 30 minutes afterwards and until after the first food of the day. If esophageal irritation is noted, she will stop the drug and call my office.  Treatment with bisphosphonate therapy will decrease fracture risk 50-70%.   There is risk of osteonecrosis of the jaw in patients using bisphosphonates is approximately 1/1700-1/100,000, with development most likely related to invasive dental procedures. If an invasive dental procedure was necessary, preferably stop the bisphosphonate approximately 3 months prior to reduce the risk. Let your dentist know that you are on this medication.  The data available at this time suggests that there is probably a small increase risk of atypical (nontraumatic) subtrochanteric fractures of the femur in patients on bisphosphonate therapy compared to those not on it. One large study suggested that for every 100 fractures prevented with bisphosphonate therapy, less than one femur fracture will occur. Other studies suggest one episode per 2,500 patient years. Patient should call with leg pain.      The patient indicates understanding of these issues and agrees with the plan.      Guidelines for parathyroid surgery in asymptomatic primary hyperparathyroidism:    Serum Calcium >1.0 mg/dl (0.25 mmol/L) above normal   Creatinine Clearance (calculated) Below 60m1/min /1.73 m2)   Bone Mineral Density T score < -2.5 SD at spine, hip (total or femoral neck) or radius (distal 1/3 site) or presence of fragility fracture   Age Age < 50 years        PARATHYROID GLAND IMAGING  Pre- operative imaging is of value prior to surgery in the localization of abnormal parathyroid tissue . The diagnosis of PHPT is based on the biochemical findings and is not affected by the results of the imaging studies. Sestamibi imaging is of value in localizing abnormal parathyroid tissue. The sensitivity and specificity varies among institutions.    More  than 50% of the time spent with Ms. Link on counseling / coordinating her care. Spent 45 min.    Follow-up:  6 months.    Keisha Castro MD refrigerator hold both the microwave and the representative ranges  Endocrinology   [facility]  January 8, 2020  CC: Prisca Lucero MD        Again, thank you for allowing me to participate in the care of your patient.        Sincerely,        Keisha Castro MD

## 2020-01-08 NOTE — PROGRESS NOTES
Name: Devyn Link  Seen for f/u of hyperPTH. Seen with .  Last seen10/2019 by Afua Guillory.  HPI:  Devyn Link is a 74 year old female who presents for the management of hyperPTH.  High calcium has been noted since 2007 on and off.  Other past medical history include CAD, history of aortic valve replacement, Hypertension, mitral valve replacement, pacemaker in place, prediabetes, osteoporosis without current pathological fracture.  Was on Amiodarone in past and now off > 6 months. Was on it for about 2-3 years.  DEXA 2017 consistent with osteoporosis. on fosamax since 11/2018.    Repeat labs showed high  Ca, high PTH, normal creatinine, 24 hr urine calcium.  Parathyroid Scan did not identify adenoma.  12/2019 labs showing normal calcium, kidney function and vitamin D levels.  Parathyroid hormones slightly high but improving.    Energy levels vary but no change since last seen.  Arms and back is hurting.  Sometimes HA.    History of Cancer:No  Thiazide Diuretic:No  Lithium use:No  Family History of pituitary adenoma, pancreas tumors, Zollinger-Diaz syndrome, pheochromocytoma. No  Kidney stones:No  Fractures: No. DEXA c/w osteoporosis- on fosamax since 11/2018.  Abdominal Pain:Yes (Please explain): sometimes. H/o peptic ulcer. Is followed up by GI.  Cramps: No  Constipation: no   Muscle Aches or pains: No  Muscle twitches: No  Nausea and vomiting: no  Confusion Lethargy and fatigue: No confusion or lethargy, fatigue off and on   ON medications like Lithium/ HCTZ: no  Average Daily Calcium intake: dairy- not much  Ca and Vit D supplementation: calcium 500 mg and vit D- but not sure about the dose.  PMH/PSH:  Past Medical History:   Diagnosis Date     CAD (coronary artery disease) 12/13/2011    Followed every 6 months by Dr. Torre, [facility]      Chronic atrial fibrillation 4/11/2013     Hyperlipidemia LDL goal <70 12/13/2011     Hypertension      Hypertension goal BP (blood pressure) < 140/90 12/14/2011      Myocardial infarction (H)      Pacemaker      Past Surgical History:   Procedure Laterality Date     C REPLACEMENT OF MITRAL VALVE  1994    Done at [facility]     COLONOSCOPY  06/13/2018    Dr. Alvarez [facility]     COLONOSCOPY N/A 6/13/2018    Procedure: COLONOSCOPY;;  Surgeon: Rosario Alvarez MD;  Location:  GI     ESOPHAGOSCOPY, GASTROSCOPY, DUODENOSCOPY (EGD), COMBINED  06/13/2018    Dr. Kim KEITH     ESOPHAGOSCOPY, GASTROSCOPY, DUODENOSCOPY (EGD), COMBINED N/A 6/13/2018    Procedure: COMBINED ESOPHAGOSCOPY, GASTROSCOPY, DUODENOSCOPY (EGD), BIOPSY SINGLE OR MULTIPLE;  COMBINED ESOPHAGOSCOPY, GASTROSCOPY, DUODENOSCOPY (EGD) with biopsies COLONOSCOPY   ;  Surgeon: Rosario Alvarez MD;  Location:  GI     HEART CATH DRUG ELUTING STENT PLACEMENT  2004/2007    seeing Cardiologist at [facility]     SURGICAL HISTORY OF -   ~ 1995    mechanical aortic valve     SURGICAL HISTORY OF -       pacemaker     SURGICAL HISTORY OF -       mechanical mitral valve     Family Hx:  Family History   Problem Relation Age of Onset     Hypertension Mother      Cancer Mother         lung     Heart Disease Mother      Cerebrovascular Disease Mother      Hypertension Sister      Colon Cancer No family hx of             Social Hx:  Social History     Socioeconomic History     Marital status:      Spouse name: Not on file     Number of children: Not on file     Years of education: Not on file     Highest education level: Not on file   Occupational History     Not on file   Social Needs     Financial resource strain: Not on file     Food insecurity:     Worry: Not on file     Inability: Not on file     Transportation needs:     Medical: Not on file     Non-medical: Not on file   Tobacco Use     Smoking status: Never Smoker     Smokeless tobacco: Never Used   Substance and Sexual Activity     Alcohol use: No     Drug use: No     Sexual activity: Yes     Partners: Male   Lifestyle     Physical activity:     Days per week:  "Not on file     Minutes per session: Not on file     Stress: Not on file   Relationships     Social connections:     Talks on phone: Not on file     Gets together: Not on file     Attends [employer] witness service: Not on file     Active member of club or organization: Not on file     Attends meetings of clubs or organizations: Not on file     Relationship status: Not on file     Intimate partner violence:     Fear of current or ex partner: Not on file     Emotionally abused: Not on file     Physically abused: Not on file     Forced sexual activity: Not on file   Other Topics Concern     Parent/sibling w/ CABG, MI or angioplasty before 65F 55M? Yes   Social History Narrative     Not on file          MEDICATIONS:  has a current medication list which includes the following prescription(s): alendronate, aspirin, atorvastatin, carvedilol, ferrous sulfate, furosemide, losartan, nitroglycerin, pantoprazole, and warfarin anticoagulant.    ROS     ROS: 10 point ROS neg other than the symptoms noted above in the HPI.    Physical Exam   VS: /80 (BP Location: Left arm, Patient Position: Chair, Cuff Size: Adult Regular)   Pulse 79   Temp 98.2  F (36.8  C) (Oral)   Resp 16   Ht 1.676 m (5' 6\")   Wt 50.5 kg (111 lb 6.4 oz)   LMP  (LMP Unknown)   SpO2 99%   Breastfeeding No   BMI 17.98 kg/m     /80 (BP Location: Left arm, Patient Position: Chair, Cuff Size: Adult Regular)   Pulse 79   Temp 98.2  F (36.8  C) (Oral)   Resp 16   Ht 1.676 m (5' 6\")   Wt 50.5 kg (111 lb 6.4 oz)   LMP  (LMP Unknown)   SpO2 99%   Breastfeeding No   BMI 17.98 kg/m    GENERAL: AXOX3, NAD, well dressed, answering questions appropriately, appears stated age.  HEENT: No exopthalmous, no proptosis, EOMI, no lig lag, no retraction  NECK: Thyroid normal in size, non tender, no nodules were palpated.  CV: RRR  LUNGS: CTAB  ABDOMEN:   NEUROLOGY: CN grossly intact, no tremors  PSYCH: normal affect and mood      LABS:  ENDO CALCIUM LABS-UMP " Latest Ref Rng & Units 9/27/2018   CALCIUM URINE G/24 H 0.10 - 0.30 g/24 h 0.17   CALCIUM URINE G/G CR g/g Cr 0.30   CALCIUM URINE MG/DL mg/dL 9.3     Calcium:  ENDO CALCIUM LABS-UMP Latest Ref Rng & Units 1/2/2020   CALCIUM 8.5 - 10.1 mg/dL 9.5     ENDO CALCIUM LABS-UMP Latest Ref Rng & Units 9/12/2019   CALCIUM 8.5 - 10.1 mg/dL 9.8   CALCIUM IONIZED 4.4 - 5.2 mg/dL 5.3 (H)     ENDO CALCIUM LABS-UMP Latest Ref Rng & Units 3/13/2019   CALCIUM 8.5 - 10.1 mg/dL 10.5 (H)   CALCIUM IONIZED 4.4 - 5.2 mg/dL 5.3 (H)     ENDO CALCIUM LABS-UMP Latest Ref Rng & Units 10/25/2018 9/27/2018   CALCIUM 8.5 - 10.1 mg/dL 10.9 (H)      ENDO CALCIUM LABS-UMP Latest Ref Rng & Units 9/26/2018   CALCIUM 8.5 - 10.1 mg/dL 10.1     ENDO CALCIUM LABS-UMP Latest Ref Rng & Units 9/10/2018 8/17/2018   CALCIUM 8.5 - 10.1 mg/dL 11.0 (H) 9.8     ENDO CALCIUM LABS-UMP Latest Ref Rng & Units 6/22/2018 6/14/2018   CALCIUM 8.5 - 10.1 mg/dL 10.2 (H)      ENDO CALCIUM LABS-UMP Latest Ref Rng & Units 6/5/2018   CALCIUM 8.5 - 10.1 mg/dL 10.6 (H)     PTH:  ENDO CALCIUM LABS-UMP Latest Ref Rng & Units 1/2/2020   PARATHYROID HORMONE INTACT 18 - 80 pg/mL 105 (H)     Component 9/12/2019   Parathyroid Hormone Intact      18 - 80 pg/mL 110 (H)     ENDO CALCIUM LABS-UMP Latest Ref Rng & Units 3/13/2019   PARATHYROID HORMONE INTACT 18 - 80 pg/mL 142 (H)     ENDO CALCIUM LABS-UMP Latest Ref Rng & Units 9/26/2018   PARATHYROID HORMONE INTACT 18 - 80 pg/mL 147 (H)     ENDO CALCIUM LABS-UMP Latest Ref Rng & Units 6/22/2018   PARATHYROID HORMONE INTACT 18 - 80 pg/mL 97 (H)     Vitamin D:   Component 9/12/2019   Vitamin D Deficiency screening      20 - 75 ug/L 33     Lab Results   Component Value Date    VITDT 30 01/02/2020    VITDT 33 09/12/2019    VITDT 28 03/13/2019    VITDT 37 09/26/2018    VITDT 27 06/22/2018     TFTs:  Lab Results   Component Value Date    TSH 0.87 04/02/2018     DEXA 10/2017:  REFERENCE T-SCORES:       Normal                -1.0 and greater                                  Osteopenia         Between -1.0 and -2.5                                           Osteoporosis     -2.5 and less                                       RISK FACTORS:  Post-menopausal, Height loss of 1.75 inches, Follow-up osteopenia, Low body weight     CURRENT TREATMENT:  Calcium, Vitamin D      FINDINGS:               Lumbar Spine (L1-L2; due to significant degenerative changes at L3 and L4, these were omitted)      T-score:  -3.3               Left Femoral Neck                      T-score:  -2.9               Right Femoral Neck                    T-score:  -3.0                             Lumbar (L1-L4) BMD: 0.876                                                           Total Hip Mean BMD: 0.663                    LATERAL VERTEBRAL ASSESSMENT  Procedure:  Vertebral fracture assessment was performed in the lateral decubitus position using a [employer] Prodigy  densitometer.  Indications for VFA: T-score of -1.0 or worse and age (female>69)  Confounding factors for VFA: Arthritis/degenerative disc disease, rib shadows.  The LVA scan is interpretable from T7 to L4.     VFA Findings: Using the semi-quantitative analysis of [employer] there was evidence of no spinal deformity  VFA Impression: Devyn Link has no vertebral fractures identified on the VFA.          IMPRESSION  Osteoporosis  Degenerative changes of the spine  Recommendations include ensuring adequate daily Calcium and Vitamin D intake  Follow up scan can be considered in three years.     Patient had a study performed previously, however the prior images are not available to compare to the current study.     Current NOF guidelines recommend treatment for patients with the following:  - Prior hip or vertebral fracture  - T-score -2.5 or below  - A 10 year risk of any major osteoporotic fracture >20% or 10 year risk of hip fracture >3%, as calculated using the FRAX calculator (www.shef.ac.uk/FRAX).       Based on these guidelines,  treatment (in addition to calcium and vitamin D) is recommended for this patient, after ruling out other causes of osteoporosis/low bone density.  While this is meant as an aid to clinical decision-making, clinical judgment must still be used.     NM PARATHYROID PLANAR W/SPECT AND CT DUAL   11/20/2018      TECHNIQUE:  766uci I123 PO; 24.6mCi Tc99m Sestamibi injected in the  right hand @1442.     HISTORY:  Hypercalcemia. Hyperparathyroidism.     COMPARISON:   Nuclear Study: None.  Other Relevant Studies: None.     FINDINGS:  Planar subtraction images show a normal appearance of the  thyroid gland with no focal lesion to indicate a parathyroid adenoma.  Likewise, the fused SPECT-CT images show no focal lesion or activity  to suggest a parathyroid adenoma. Noncontrast CT scan shows no  evidence of a parathyroid adenoma. 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         IMPRESSION: No nuclear or CT evidence of a parathyroid adenoma.  All pertinent notes, labs, and images personally reviewed by me.     A/P  Ms.Xiang Link is a 74 year old here for the management of:    1. Hyperparathyroidism:    Recent labs stable.  Results reviewed in detail.  In the differential diagnosis of hypercalcemia, primary hyperparathyroidism is the most common cause. It is important to distinguish readily between primary hyperparathyroidism and other causes of hypercalcemia.    High calcium levels noted on and off since 2007.  More consistently high calcium level since 2017  Slightly elevated parathyroid hormone  Kidney function is in normal range  Vitamin D in normal range  Not on thaizide diuretic or lithium.  History of osteoporosis based on DEXA scan done 2017. On fosamax since 11/2018.    History of peptic ulcer.  Followed by GI. Gastrin slightly high-  in the setting of PPI use.  12/2019 labs showing normal calcium, kidney function and vitamin D levels.  Parathyroid hormones slightly high but improving.  Plan:  Discussed diagnosis, pathophysiology, management and treatment options of condition with pt.  Discussed close monitoring of labs at periodic interval vs neck exploration for parathyroid adenoma ( in the setting of osteoporosis).  At this time she still prefers to continue to monitor.  Plan to repeat labs in 6 months  F/u with Dr. Castro in 6 months with labs one week prior.    Discussed s/s of hypercalcemia and recommend adequate hydration.       2.  Osteoporosis:  DEXA 2017 c/w osteoporosis.  On fosamax since 11/2018.  Tolerating well.  History of peptic ulcer.  Followed by GI.   Plan:  Discussed diagnosis, pathophysiology, management and treatment options of condition with pt.  Continue Fosamax 70 mg ONCE a week (started 11/21/2018)  Consider bone density scan in 11/2020.    The pt was advised to    Maintain an adequate calcium and vitamin D intake and to supplement vitamin D if needed to maintain serum levels of 25 hydroxy D (25 OH D) between 30-60 ng/ml.    Limit alcohol intake to no more than 2 servings per day.    Limit caffeine intake.    Maintain an active lifestyle including weight-bearing exercises for at least 30 mins daily.    Take measures to reduce the risk of falling.  Discussed indications, risks and benefits of all medications prescribed, and answered questions to patient's satisfaction.  Instructions on Fosamax use and side effects - particularly esophageal adverse events - are carefully reviewed with her. This drug must be taken upon arising for the day on an empty stomach, with a large 6-8 ounce glass of water; she must remain NPO in the upright position for at least 30 minutes afterwards and until after the first food of the day. If esophageal irritation is noted, she will stop the drug and call my office.  Treatment with  bisphosphonate therapy will decrease fracture risk 50-70%.   There is risk of osteonecrosis of the jaw in patients using bisphosphonates is approximately 1/1700-1/100,000, with development most likely related to invasive dental procedures. If an invasive dental procedure was necessary, preferably stop the bisphosphonate approximately 3 months prior to reduce the risk. Let your dentist know that you are on this medication.  The data available at this time suggests that there is probably a small increase risk of atypical (nontraumatic) subtrochanteric fractures of the femur in patients on bisphosphonate therapy compared to those not on it. One large study suggested that for every 100 fractures prevented with bisphosphonate therapy, less than one femur fracture will occur. Other studies suggest one episode per 2,500 patient years. Patient should call with leg pain.      The patient indicates understanding of these issues and agrees with the plan.      Guidelines for parathyroid surgery in asymptomatic primary hyperparathyroidism:    Serum Calcium >1.0 mg/dl (0.25 mmol/L) above normal   Creatinine Clearance (calculated) Below 60m1/min /1.73 m2)   Bone Mineral Density T score < -2.5 SD at spine, hip (total or femoral neck) or radius (distal 1/3 site) or presence of fragility fracture   Age Age < 50 years        PARATHYROID GLAND IMAGING  Pre- operative imaging is of value prior to surgery in the localization of abnormal parathyroid tissue . The diagnosis of PHPT is based on the biochemical findings and is not affected by the results of the imaging studies. Sestamibi imaging is of value in localizing abnormal parathyroid tissue. The sensitivity and specificity varies among institutions.    More than 50% of the time spent with Ms. Link on counseling / coordinating her care. Spent 45 min.    Follow-up:  6 months.    Keisha Castro MD refrigerator hold both the microwave and the [employer]  Endocrinology    Carlos Eduardo oMon/Shirley  January 8, 2020  CC: Prisca Lucero MD

## 2020-01-08 NOTE — PATIENT INSTRUCTIONS
[facility] & [place] locations   Dr Castro, Endocrinology Department      [facility]   [address]  [place] [address]  Appointment [phone]  Fax: [phone]  [place]: Monday and Tuesday         [facility]   [address]  [place] [address]  Appointment [phone]  Fax: [phone]  [place]: Wednesday and Thursday            Please check the cost coverage and copay with insurance before recommended tests, services and medications (especially if new medications are prescribed).     If ordered, please get blood work done 1 week prior to your next appointment so they will be available to Dr. Castro at your visit.    Plan to continue to monitor.  Labs in 6 months.  Please make a lab appointment for blood work and follow up clinic appointment in 1 week after that to discuss results.    You should get 800-1000 mg/day calcium in divided doses of no more than 500 mg/dose.  This INCLUDES what is in your food as well as what is in any supplements you may be taking.    Vit D about 5122-3138 IU/day ( unless you have vit D deficiency- in that case higher dose)  Dietary sources of calcium:: These also contain vitamin D  Milk                            8 oz            300 mg calcium  Yogurt                          1 cup           400 mg calcium   Hard cheese                     1.5 oz          300 mg  Cottage cheese                  2 cup           300 mg  Orange juice with Calcium       8 oz            300 mg  Low fat dairy sources are recommended      You should get 30 minutes of moderate weight bearing exercise on most days of the week .  Weight bearing exercise includes such things as walking, jogging, hiking, dancing.  You should also get Strength training 2 or more times/week in addition to other weight -being exercise. Strength training uses weight or resistance beyond that seen in  everyday activities -(pilates, weight training with free weights, weight machines or resistance bands)

## 2020-01-14 ENCOUNTER — OFFICE VISIT (OUTPATIENT)
Dept: FAMILY MEDICINE | Facility: CLINIC | Age: 75
End: 2020-01-14
Payer: COMMERCIAL

## 2020-01-14 VITALS
RESPIRATION RATE: 16 BRPM | DIASTOLIC BLOOD PRESSURE: 70 MMHG | SYSTOLIC BLOOD PRESSURE: 118 MMHG | HEIGHT: 66 IN | WEIGHT: 111.3 LBS | OXYGEN SATURATION: 98 % | HEART RATE: 67 BPM | BODY MASS INDEX: 17.89 KG/M2 | TEMPERATURE: 97.5 F

## 2020-01-14 DIAGNOSIS — I50.9 CHRONIC HEART FAILURE, UNSPECIFIED HEART FAILURE TYPE (H): ICD-10-CM

## 2020-01-14 DIAGNOSIS — I10 HYPERTENSION GOAL BP (BLOOD PRESSURE) < 140/90: ICD-10-CM

## 2020-01-14 DIAGNOSIS — I48.20 CHRONIC ATRIAL FIBRILLATION (H): ICD-10-CM

## 2020-01-14 DIAGNOSIS — I25.10 CORONARY ARTERY DISEASE INVOLVING NATIVE CORONARY ARTERY OF NATIVE HEART WITHOUT ANGINA PECTORIS: ICD-10-CM

## 2020-01-14 DIAGNOSIS — R73.03 PREDIABETES: ICD-10-CM

## 2020-01-14 DIAGNOSIS — Z95.2 AORTIC VALVE REPLACED: ICD-10-CM

## 2020-01-14 DIAGNOSIS — Z87.898 HISTORY OF ULCER DISEASE: ICD-10-CM

## 2020-01-14 DIAGNOSIS — R73.09 ABNORMAL GLUCOSE: ICD-10-CM

## 2020-01-14 DIAGNOSIS — Z00.00 ENCOUNTER FOR MEDICARE ANNUAL WELLNESS EXAM: Primary | ICD-10-CM

## 2020-01-14 DIAGNOSIS — D64.9 ANEMIA, UNSPECIFIED TYPE: ICD-10-CM

## 2020-01-14 DIAGNOSIS — E78.5 HYPERLIPIDEMIA LDL GOAL <70: ICD-10-CM

## 2020-01-14 DIAGNOSIS — Z95.2 MITRAL VALVE REPLACED: ICD-10-CM

## 2020-01-14 DIAGNOSIS — R19.5 LOOSE STOOLS: ICD-10-CM

## 2020-01-14 DIAGNOSIS — M25.551 HIP PAIN, RIGHT: ICD-10-CM

## 2020-01-14 DIAGNOSIS — M79.621 PAIN OF RIGHT UPPER ARM: ICD-10-CM

## 2020-01-14 LAB
ERYTHROCYTE [DISTWIDTH] IN BLOOD BY AUTOMATED COUNT: 13.8 % (ref 10–15)
HBA1C MFR BLD: 5.9 % (ref 0–5.6)
HCT VFR BLD AUTO: 38.6 % (ref 35–47)
HGB BLD-MCNC: 12.1 G/DL (ref 11.7–15.7)
MCH RBC QN AUTO: 30.5 PG (ref 26.5–33)
MCHC RBC AUTO-ENTMCNC: 31.3 G/DL (ref 31.5–36.5)
MCV RBC AUTO: 97 FL (ref 78–100)
PLATELET # BLD AUTO: 194 10E9/L (ref 150–450)
RBC # BLD AUTO: 3.97 10E12/L (ref 3.8–5.2)
WBC # BLD AUTO: 5.1 10E9/L (ref 4–11)

## 2020-01-14 PROCEDURE — 36415 COLL VENOUS BLD VENIPUNCTURE: CPT | Performed by: FAMILY MEDICINE

## 2020-01-14 PROCEDURE — 83036 HEMOGLOBIN GLYCOSYLATED A1C: CPT | Performed by: FAMILY MEDICINE

## 2020-01-14 PROCEDURE — 82043 UR ALBUMIN QUANTITATIVE: CPT | Performed by: FAMILY MEDICINE

## 2020-01-14 PROCEDURE — 80053 COMPREHEN METABOLIC PANEL: CPT | Performed by: FAMILY MEDICINE

## 2020-01-14 PROCEDURE — 85027 COMPLETE CBC AUTOMATED: CPT | Performed by: FAMILY MEDICINE

## 2020-01-14 PROCEDURE — 99397 PER PM REEVAL EST PAT 65+ YR: CPT | Performed by: FAMILY MEDICINE

## 2020-01-14 PROCEDURE — 99213 OFFICE O/P EST LOW 20 MIN: CPT | Mod: 25 | Performed by: FAMILY MEDICINE

## 2020-01-14 PROCEDURE — 80061 LIPID PANEL: CPT | Performed by: FAMILY MEDICINE

## 2020-01-14 RX ORDER — PANTOPRAZOLE SODIUM 40 MG/1
TABLET, DELAYED RELEASE ORAL
Qty: 90 TABLET | Refills: 1 | Status: SHIPPED | OUTPATIENT
Start: 2020-01-14 | End: 2020-06-29

## 2020-01-14 ASSESSMENT — ENCOUNTER SYMPTOMS
FLANK PAIN: 0
WOUND: 0
STRIDOR: 0
RECTAL PAIN: 0
DIARRHEA: 1
FEVER: 0
EYE ITCHING: 0
CHEST TIGHTNESS: 0
APNEA: 0
EYE PAIN: 0
AGITATION: 0
EYE REDNESS: 0
PARESTHESIAS: 0
BREAST MASS: 0
ACTIVITY CHANGE: 0
HEMATURIA: 0
BACK PAIN: 1
RHINORRHEA: 0
ANAL BLEEDING: 0
SORE THROAT: 0
FREQUENCY: 0
JOINT SWELLING: 0
CONFUSION: 0
FATIGUE: 1
TREMORS: 0
HEADACHES: 1
VOMITING: 0
NAUSEA: 0
HYPERACTIVE: 0
EYE DISCHARGE: 0
HEARTBURN: 0
COLOR CHANGE: 0
DYSPHORIC MOOD: 0
WEAKNESS: 0
SEIZURES: 0
PALPITATIONS: 0
UNEXPECTED WEIGHT CHANGE: 0
NERVOUS/ANXIOUS: 0
ABDOMINAL PAIN: 0
COUGH: 0
ABDOMINAL DISTENTION: 0
HALLUCINATIONS: 0
FACIAL SWELLING: 0
ADENOPATHY: 0
CHOKING: 0
NECK STIFFNESS: 0
TROUBLE SWALLOWING: 0
SLEEP DISTURBANCE: 0
APPETITE CHANGE: 0
LIGHT-HEADEDNESS: 1
HEMATOCHEZIA: 0
DIZZINESS: 1
PHOTOPHOBIA: 0
POLYDIPSIA: 0
DECREASED CONCENTRATION: 0
NUMBNESS: 0
SHORTNESS OF BREATH: 1
CONSTIPATION: 0
DYSURIA: 0
SPEECH DIFFICULTY: 0
CHILLS: 0
NECK PAIN: 0
DIFFICULTY URINATING: 0
SINUS PAIN: 0
SINUS PRESSURE: 0
POLYPHAGIA: 0
DIAPHORESIS: 0
VOICE CHANGE: 0
ARTHRALGIAS: 0
BRUISES/BLEEDS EASILY: 0
WHEEZING: 0

## 2020-01-14 ASSESSMENT — ACTIVITIES OF DAILY LIVING (ADL): CURRENT_FUNCTION: NO ASSISTANCE NEEDED

## 2020-01-14 ASSESSMENT — MIFFLIN-ST. JEOR: SCORE: 1011.6

## 2020-01-14 NOTE — PATIENT INSTRUCTIONS
Patient Education   Personalized Prevention Plan  You are due for the preventive services outlined below.  Your care team is available to assist you in scheduling these services.  If you have already completed any of these items, please share that information with your care team to update in your medical record.  Health Maintenance Due   Topic Date Due     Zoster (Shingles) Vaccine (1 of 2) 12/12/1993     Discuss Advance Care Planning  02/25/2018     Annual Wellness Visit  10/25/2019     FALL RISK ASSESSMENT  10/25/2019     PHQ-2  01/01/2020      -----------------------------------------------------------------------------    The following are some fiber supplements that may help with constipation:    Benefiber  Citrucel  Metamucil  Fibercon

## 2020-01-14 NOTE — PROGRESS NOTES
SUBJECTIVE:   Devyn Link is a 74 year old female who presents for Preventive Visit.      Are you in the first 12 months of your Medicare coverage?  No    Healthy Habits:    In general, how would you rate your overall health?  Good    Frequency of exercise:  2-3 days/week    Duration of exercise:  Greater than 60 minutes    Taking medications regularly:  Yes    Barriers to taking medications:  None    Medication side effects:  None    Ability to successfully perform activities of daily living:  No assistance needed    Home Safety:  Lack of grab bars in the bathroom    Hearing Impairment:  No hearing concerns    In the past 6 months, have you been bothered by leaking of urine?  No    In general, how would you rate your overall mental or emotional health?  Good      PHQ-2 Total Score:    Additional concerns today:  No    Do you feel safe in your environment? Yes    Have you ever done Advance Care Planning? (For example, a Health Directive, POLST, or a discussion with a medical provider or your loved ones about your wishes):       Fall risk       Cognitive Screening   1) Repeat 3 items (Leader, Season, Table)    2) Clock draw: NORMAL  3) 3 item recall: Recalls 3 objects  Results: 3 items recalled: COGNITIVE IMPAIRMENT LESS LIKELY    Mini-CogTM Copyright MARIANNE Monae. Licensed by the author for use in [facility]; reprinted with permission (soob@[facility]). All rights reserved.      Do you have sleep apnea, excessive snoring or daytime drowsiness?: yes- sometimes drowsiness    Reviewed and updated as needed this visit by clinical staff         Reviewed and updated as needed this visit by Provider        Social History     Tobacco Use     Smoking status: Never Smoker     Smokeless tobacco: Never Used   Substance Use Topics     Alcohol use: No     If you drink alcohol do you typically have >3 drinks per day or >7 drinks per week? No    Alcohol Use 10/25/2018   Prescreen: >3 drinks/day or >7 drinks/week? No   Prescreen:  >3 drinks/day or >7 drinks/week? -         Current providers sharing in care for this patient include:   Patient Care Team:  Prisca Lucero MD as PCP - General (Family Practice)  Prisca Lucero MD as Assigned PCP    The following health maintenance items are reviewed in Epic and correct as of today:  Health Maintenance   Topic Date Due     ZOSTER IMMUNIZATION (1 of 2) 12/12/1993     ADVANCE CARE PLANNING  02/25/2018     MEDICARE ANNUAL WELLNESS VISIT  10/25/2019     FALL RISK ASSESSMENT  10/25/2019     PHQ-2  01/01/2020     LIPID  10/25/2023     DTAP/TDAP/TD IMMUNIZATION (3 - Td) 04/16/2024     COLONOSCOPY  06/13/2028     DEXA  Completed     INFLUENZA VACCINE  Completed     PNEUMOCOCCAL IMMUNIZATION 65+ LOW/MEDIUM RISK  Completed     IPV IMMUNIZATION  Aged Out     MENINGITIS IMMUNIZATION  Aged Out       Patient Active Problem List   Diagnosis     Hyperlipidemia LDL goal <70     CAD (coronary artery disease)     Long term current use of anticoagulant     Hypertension goal BP (blood pressure) < 140/90     Advanced directives, counseling/discussion     Bradycardia     Health Care Home     Chronic atrial fibrillation     Heart failure (H)     Anemia     Pacemaker     Aortic valve replaced     Mitral valve replaced     Abnormal glucose     Prediabetes     Other osteoporosis without current pathological fracture     Iron deficiency anemia, unspecified iron deficiency anemia type     Hyperparathyroidism (H)     Hypercalcemia       Past Medical History:   Diagnosis Date     CAD (coronary artery disease) 12/13/2011    Followed every 6 months by Dr. Torre, [facility]      Chronic atrial fibrillation 4/11/2013     Hyperlipidemia LDL goal <70 12/13/2011     Hypertension goal BP (blood pressure) < 140/90 12/14/2011     Myocardial infarction (H)      Pacemaker        Past Surgical History:   Procedure Laterality Date     C REPLACEMENT OF MITRAL VALVE  1994    Done at [facility]     COLONOSCOPY N/A 6/13/2018    Procedure:  COLONOSCOPY;;  Surgeon: Rosario Alvarez MD;  Location:  GI     ESOPHAGOSCOPY, GASTROSCOPY, DUODENOSCOPY (EGD), COMBINED  2018    Dr. Alvarez [facility]     ESOPHAGOSCOPY, GASTROSCOPY, DUODENOSCOPY (EGD), COMBINED N/A 2018    Procedure: COMBINED ESOPHAGOSCOPY, GASTROSCOPY, DUODENOSCOPY (EGD), BIOPSY SINGLE OR MULTIPLE;  COMBINED ESOPHAGOSCOPY, GASTROSCOPY, DUODENOSCOPY (EGD) with biopsies COLONOSCOPY   ;  Surgeon: Rosario Alvarez MD;  Location:  GI     HEART CATH DRUG ELUTING STENT PLACEMENT      seeing Cardiologist at [facility]     SURGICAL HISTORY OF -   ~     mechanical aortic valve     SURGICAL HISTORY OF -       pacemaker     SURGICAL HISTORY OF -       mechanical mitral valve       OB History    Para Term  AB Living   1 1 1 0 0 1   SAB TAB Ectopic Multiple Live Births   0 0 0 0 0      # Outcome Date GA Lbr Velasquez/2nd Weight Sex Delivery Anes PTL Lv   1 Term                Current Outpatient Medications   Medication Sig Dispense Refill     alendronate (FOSAMAX) 70 MG tablet TAKE 1 TABLET BY MOUTH EVERY 7 DAYS WITH 8 OUNCES OF WATER 30 MINUTES BEFORE BREAKFAST AND REMAIN UPRIGHT DURING THIS TIME. 12 tablet 3     aspirin 81 MG tablet Take 81 mg by mouth daily       atorvastatin (LIPITOR) 20 MG tablet Take 1 tablet (20 mg) by mouth daily 90 tablet 1     carvedilol (COREG) 3.125 MG tablet Take 1 tablet (3.125 mg) by mouth 2 times daily (with meals) 60 tablet      Ferrous Sulfate (IRON SUPPLEMENT PO) Take 325 mg by mouth Every other day       furosemide (LASIX) 20 MG tablet Take 20 mg by mouth daily  60 tablet 1     losartan (COZAAR) 25 MG tablet Take 25 mg by mouth daily       nitroglycerin (NITROSTAT) 0.4 MG SL tablet Place 1 tablet under the tongue every 5 minutes as needed for chest pain. 25 tablet 0     pantoprazole (PROTONIX) 40 MG EC tablet TAKE 1 TABLET EVERY DAY 90 tablet 1     warfarin (COUMADIN) 1 MG tablet As per INR clinic at [facility] [place] 30 tablet         Family History   Problem Relation Age of Onset     Hypertension Mother      Cancer Mother         lung     Heart Disease Mother      Cerebrovascular Disease Mother      Hypertension Sister      Colon Cancer No family hx of        Social History     Tobacco Use     Smoking status: Never Smoker     Smokeless tobacco: Never Used   Substance Use Topics     Alcohol use: No       Immunization History   Administered Date(s) Administered     Influenza (High Dose) 3 valent vaccine 10/11/2014, 09/26/2015, 10/13/2016, 10/10/2017, 09/10/2018, 10/05/2019     Influenza (IIV3) PF 11/10/2003, 10/21/2004, 12/13/2005, 11/07/2006, 11/13/2007, 10/28/2008, 10/23/2011, 10/01/2012, 10/27/2013, 10/15/2014     Influenza Vaccine IM > 6 months Valent IIV4 10/28/2013     Pneumo Conj 13-V (2010&after) 08/05/2015     Pneumococcal 23 valent 04/16/2014     TD (ADULT, 7+) 04/05/2004     TDAP Vaccine (Adacel) 04/16/2014       Review of Systems   Constitutional: Positive for fatigue. Negative for activity change, appetite change, chills, diaphoresis, fever and unexpected weight change.   HENT: Negative for congestion, dental problem, drooling, ear discharge, ear pain, facial swelling, hearing loss, mouth sores, nosebleeds, postnasal drip, rhinorrhea, sinus pressure, sinus pain, sneezing, sore throat, tinnitus, trouble swallowing and voice change.    Eyes: Positive for visual disturbance (Not new. has not seen eye doctor.). Negative for photophobia, pain, discharge, redness and itching.   Respiratory: Positive for shortness of breath (Not new.). Negative for apnea, cough, choking, chest tightness, wheezing and stridor.    Cardiovascular: Positive for chest pain (Not new; Cardiology aware). Negative for palpitations and peripheral edema.   Gastrointestinal: Positive for diarrhea (2-3 times per day; watery.  no blood. ). Negative for abdominal distention, abdominal pain, anal bleeding, constipation, heartburn, hematochezia, nausea, rectal pain and  vomiting.   Endocrine: Negative for cold intolerance, heat intolerance, polydipsia, polyphagia and polyuria.   Breasts:  Negative for tenderness, breast mass and discharge.   Genitourinary: Negative for decreased urine volume, difficulty urinating, dyspareunia, dysuria, enuresis, flank pain, frequency, genital sores, hematuria, menstrual problem, pelvic pain, urgency, vaginal bleeding, vaginal discharge and vaginal pain.   Musculoskeletal: Positive for back pain. Negative for arthralgias, joint swelling, neck pain and neck stiffness.   Skin: Negative for color change, pallor, rash and wound.   Allergic/Immunologic: Negative for environmental allergies, food allergies and immunocompromised state.   Neurological: Positive for dizziness, light-headedness and headaches. Negative for tremors, seizures, syncope, speech difficulty, weakness, numbness and paresthesias.   Hematological: Negative for adenopathy. Does not bruise/bleed easily.   Psychiatric/Behavioral: Negative for agitation, behavioral problems, confusion, decreased concentration, dysphoric mood, hallucinations, mood changes, self-injury, sleep disturbance and suicidal ideas. The patient is not nervous/anxious and is not hyperactive.      Here with  who is fair with English.  There was an  on the phone as well.     Right arm and hip pain.  6 months. Getting worse.    Upper arm. No injury. No numbness or tingling.   Difficult with overhead activity.    Back of hip.  Hurts to change positions in bed, etc.  Heat can be helpful.     Loose stools seem to occur after milk initially, but now can be with other foods as well; not sure what.      She does wonder about who would take care of her if her  was sick or unable to do so.  notes she does worry about this at home as well.  They do live in a single family home. Currently, he does the shoveling and mowing. No difficulty with shopping or food preparation. They do have a son in the  "area; ~ 45 minutes away.     OBJECTIVE:   /70 (BP Location: Right arm, Cuff Size: Adult Regular)   Pulse 67   Temp 97.5  F (36.4  C) (Oral)   Resp 16   Ht 1.676 m (5' 6\")   Wt 50.5 kg (111 lb 4.8 oz)   LMP  (LMP Unknown)   SpO2 98%   BMI 17.96 kg/m   Estimated body mass index is 17.96 kg/m  as calculated from the following:    Height as of this encounter: 1.676 m (5' 6\").    Weight as of this encounter: 50.5 kg (111 lb 4.8 oz).  Physical Exam  GENERAL APPEARANCE: alert and no distress  EYES: Eyes grossly normal to inspection, PERRL and conjunctivae and sclerae normal  HENT: ear canals and TM's normal, nose and mouth without ulcers or lesions, oropharynx clear and oral mucous membranes moist  NECK: no adenopathy, no asymmetry, masses, or scars and thyroid normal to palpation  RESP: lungs clear to auscultation - no rales, rhonchi or wheezes  CV: regular rates and rhythm and no peripheral edema. There is a grade III/VI systolic murmur. Crisp valve sounds.   ABDOMEN: soft, nontender, no hepatosplenomegaly, no masses and bowel sounds normal  MS: no musculoskeletal defects are noted and gait is age appropriate without ataxia  SKIN: no suspicious lesions or rashes  NEURO: Normal strength and tone, sensory exam grossly normal, mentation intact and speech normal  PSYCH: mentation appears normal and affect normal/bright    Recent Labs   Lab Test 10/25/18  1147 09/01/17  1137  08/06/15  0835 03/16/15   CHOL 147 156   < > 156 148   HDL 47* 52   < > 49* 44   LDL 74 79   < > 79 85   TRIG 129 124   < > 142 95   CHOLHDLRATIO  --   --   --  3.2 3.36    < > = values in this interval not displayed.     GFR Estimate   Date Value Ref Range Status   01/02/2020 87 >60 mL/min/[1.73_m2] Final     Comment:     Non  GFR Calc  Starting 12/18/2018, serum creatinine based estimated GFR (eGFR) will be   calculated using the Chronic Kidney Disease Epidemiology Collaboration   (CKD-EPI) equation.     09/12/2019 82 >60 " mL/min/[1.73_m2] Final     Comment:     Non  GFR Calc  Starting 12/18/2018, serum creatinine based estimated GFR (eGFR) will be   calculated using the Chronic Kidney Disease Epidemiology Collaboration   (CKD-EPI) equation.     03/13/2019 87 >60 mL/min/[1.73_m2] Final     Comment:     Non  GFR Calc  Starting 12/18/2018, serum creatinine based estimated GFR (eGFR) will be   calculated using the Chronic Kidney Disease Epidemiology Collaboration   (CKD-EPI) equation.       Lab Results   Component Value Date    A1C 6.3 04/02/2018    A1C 6.2 09/01/2017    A1C 6.2 08/06/2015       Reviewed recent Cardiology note.         ASSESSMENT / PLAN:   1. Encounter for Medicare annual wellness exam  Notes has not seen an eye doctor in some time; not sure where to go.   Aware of shingrix.  - OPHTHALMOLOGY ADULT REFERRAL    2. Coronary artery disease involving native coronary artery of native heart without angina pectoris  She does follow up regularly with Cardiology. Just saw yesterday.  Care Coordination referral primarily for information on services; they requested a brochure or other information. They do not have needs at this time, but she worries if her  should become ill.   - CARE COORDINATION REFERRAL    3. Chronic atrial fibrillation  As  Above.  - CARE COORDINATION REFERRAL    4. Chronic heart failure, unspecified heart failure type (H)  Stable; as above.  - CARE COORDINATION REFERRAL    5. Aortic valve replaced  Sees Cardiology  - CARE COORDINATION REFERRAL    6. Mitral valve replaced  Sees Cardiology  - CARE COORDINATION REFERRAL    7. Hyperlipidemia LDL goal <70  Fasting today.   - Lipid panel reflex to direct LDL Fasting  - Comprehensive metabolic panel    8. Hypertension goal BP (blood pressure) < 140/90  Satisfactory control.   - Comprehensive metabolic panel    9. Abnormal glucose  Encourage healthy lifestyle.   - Comprehensive metabolic panel  - Hemoglobin A1c    10.  "Prediabetes    - Comprehensive metabolic panel  - Hemoglobin A1c    11. Loose stools  Discussed fiber supplementation. Consider additional evaluation if more of an issue.    12. Pain of right upper arm  Discussed some options. Would recommend physical therapy. [facility] does not want to go to physical therapy. Discussed learning which exercises, etc.   She will let me know if ongoing problems; not improving.     13. Hip pain, right  As above.     14. Anemia, unspecified type    - CBC with platelets  - Albumin Random Urine Quantitative with Creat Ratio    15. History of ulcer disease    - pantoprazole (PROTONIX) 40 MG EC tablet; TAKE 1 TABLET EVERY DAY  Dispense: 90 tablet; Refill: 1        COUNSELING:  Reviewed preventive health counseling, as reflected in patient instructions       Regular exercise       Healthy diet/nutrition       Vision screening       Dental care    Estimated body mass index is 17.96 kg/m  as calculated from the following:    Height as of this encounter: 1.676 m (5' 6\").    Weight as of this encounter: 50.5 kg (111 lb 4.8 oz).    Weight management plan noted, stable and monitoring     reports that she has never smoked. She has never used smokeless tobacco.      Appropriate preventive services were discussed with this patient, including applicable screening as appropriate for cardiovascular disease, diabetes, osteopenia/osteoporosis, and glaucoma.  As appropriate for age/gender, discussed screening for colorectal cancer, prostate cancer, breast cancer, and cervical cancer. Checklist reviewing preventive services available has been given to the patient.    Reviewed patients plan of care and provided an AVS. The Basic Care Plan (routine screening as documented in Health Maintenance) for Devyn meets the Care Plan requirement. This Care Plan has been established and reviewed with the Patient and spouse.    Counseling Resources:  ATP IV Guidelines  Pooled Cohorts Equation " Calculator  Breast Cancer Risk Calculator  FRAX Risk Assessment  ICSI Preventive Guidelines  Dietary Guidelines for Americans, 2010  [employer]'s MyPlate  ASA Prophylaxis  Lung CA Screening    Prisca Lucero MD, MD  [facility]    Identified Health Risks:

## 2020-01-15 LAB
ALBUMIN SERPL-MCNC: 3.9 G/DL (ref 3.4–5)
ALP SERPL-CCNC: 69 U/L (ref 40–150)
ALT SERPL W P-5'-P-CCNC: 19 U/L (ref 0–50)
ANION GAP SERPL CALCULATED.3IONS-SCNC: 1 MMOL/L (ref 3–14)
AST SERPL W P-5'-P-CCNC: 23 U/L (ref 0–45)
BILIRUB SERPL-MCNC: 0.9 MG/DL (ref 0.2–1.3)
BUN SERPL-MCNC: 21 MG/DL (ref 7–30)
CALCIUM SERPL-MCNC: 9.6 MG/DL (ref 8.5–10.1)
CHLORIDE SERPL-SCNC: 107 MMOL/L (ref 94–109)
CHOLEST SERPL-MCNC: 134 MG/DL
CO2 SERPL-SCNC: 32 MMOL/L (ref 20–32)
CREAT SERPL-MCNC: 0.68 MG/DL (ref 0.52–1.04)
CREAT UR-MCNC: 86 MG/DL
GFR SERPL CREATININE-BSD FRML MDRD: 85 ML/MIN/{1.73_M2}
GLUCOSE SERPL-MCNC: 93 MG/DL (ref 70–99)
HDLC SERPL-MCNC: 52 MG/DL
LDLC SERPL CALC-MCNC: 60 MG/DL
MICROALBUMIN UR-MCNC: 13 MG/L
MICROALBUMIN/CREAT UR: 15.05 MG/G CR (ref 0–25)
NONHDLC SERPL-MCNC: 82 MG/DL
POTASSIUM SERPL-SCNC: 4.1 MMOL/L (ref 3.4–5.3)
PROT SERPL-MCNC: 7 G/DL (ref 6.8–8.8)
SODIUM SERPL-SCNC: 140 MMOL/L (ref 133–144)
TRIGL SERPL-MCNC: 112 MG/DL

## 2020-01-16 ENCOUNTER — PATIENT OUTREACH (OUTPATIENT)
Dept: CARE COORDINATION | Facility: CLINIC | Age: 75
End: 2020-01-16

## 2020-01-16 NOTE — PROGRESS NOTES
Clinic Care Coordination Contact  [facility]/Voicemail       Clinical Data: Care Coordinator Outreach  Outreach attempted x 1.  Left message on patient's voicemail with call back information and requested return call.  Plan:  Care Coordinator will try to reach patient again in 3-5 business days.    Phong Beasley [facility]  Clinic Care Coordinator  [facility]  [phone]  [email or web address]

## 2020-01-27 DIAGNOSIS — Z95.2 AORTIC VALVE REPLACED: Primary | ICD-10-CM

## 2020-01-27 DIAGNOSIS — Z95.2 MITRAL VALVE REPLACED: ICD-10-CM

## 2020-01-27 DIAGNOSIS — Z29.89 NEED FOR SBE (SUBACUTE BACTERIAL ENDOCARDITIS) PROPHYLAXIS: ICD-10-CM

## 2020-01-27 NOTE — TELEPHONE ENCOUNTER
Reason for Call:  Medication or medication refill:    Do you use a [facility]?  Name of the pharmacy and phone number for the current   request: [facility]    Name of the medication requested: antibiotic     Other request: is having dental work done, no date set yet    Can we leave a detailed message on this number? YES    Phone number patient can be reached at: Home number on file [phone] (home)    Best Time: any    Call taken on 1/27/2020 at 4:15 PM by Shiresa H. Ormond

## 2020-01-27 NOTE — TELEPHONE ENCOUNTER
See if she is anticipating one visit or more.    The prescription is t'd up for one visit, but if she is doing more, we can increase.

## 2020-01-28 RX ORDER — AMOXICILLIN 500 MG/1
2000 CAPSULE ORAL DAILY
Qty: 4 CAPSULE | Refills: 0 | Status: SHIPPED | OUTPATIENT
Start: 2020-01-28

## 2020-01-28 NOTE — PROGRESS NOTES
Clinic Care Coordination Contact  [facility]/Voicemail    Referral Source: PCP  Clinical Data: Care Coordinator Outreach  Outreach attempted x 2.  Left message on patient's voicemail with call back information and requested return call.  Plan: Care Coordinator will send care coordination introduction letter with care coordinator contact information and explanation of care coordination services via [employer]. Care Coordinator will do no further outreaches at this time.    Phong Beasley PRADEEP  Clinic Care Coordinator  [facility]  [phone]  [email or web address]

## 2020-06-28 DIAGNOSIS — Z87.898 HISTORY OF ULCER DISEASE: ICD-10-CM

## 2020-06-28 DIAGNOSIS — M81.8 OTHER OSTEOPOROSIS WITHOUT CURRENT PATHOLOGICAL FRACTURE: ICD-10-CM

## 2020-06-29 RX ORDER — PANTOPRAZOLE SODIUM 40 MG/1
TABLET, DELAYED RELEASE ORAL
Qty: 90 TABLET | Refills: 0 | Status: SHIPPED | OUTPATIENT
Start: 2020-06-29 | End: 2020-09-10

## 2020-06-29 NOTE — TELEPHONE ENCOUNTER
Routing refill request to provider for review/approval because:  Protocol failed: No history of Osteoporosis    Sally CASSIDY RN

## 2020-06-30 RX ORDER — ALENDRONATE SODIUM 70 MG/1
TABLET ORAL
Qty: 12 TABLET | Refills: 3 | Status: SHIPPED | OUTPATIENT
Start: 2020-06-30 | End: 2020-12-17

## 2020-06-30 NOTE — TELEPHONE ENCOUNTER
Pending Prescriptions:                       Disp   Refills    alendronate (FOSAMAX) 70 MG tablet [Pharma*12 tab*3        Sig: TAKE 1 TABLET BY MOUTH EVERY 7 DAYS WITH 8 OUNCES OF           WATER 30 MINUTES BEFORE BREAKFAST AND REMAIN           UPRIGHT DURING THIS TIME.    Routing refill request to provider for review/approval because:  Patient fails protocol- Dexa

## 2020-07-01 DIAGNOSIS — M81.8 OTHER OSTEOPOROSIS WITHOUT CURRENT PATHOLOGICAL FRACTURE: ICD-10-CM

## 2020-07-01 DIAGNOSIS — E21.3 HYPERPARATHYROIDISM (H): ICD-10-CM

## 2020-07-01 LAB
CA-I SERPL ISE-MCNC: 5.1 MG/DL (ref 4.4–5.2)
CALCIUM SERPL-MCNC: 9.7 MG/DL (ref 8.5–10.1)
CREAT SERPL-MCNC: 0.67 MG/DL (ref 0.52–1.04)
GFR SERPL CREATININE-BSD FRML MDRD: 85 ML/MIN/{1.73_M2}
MAGNESIUM SERPL-MCNC: 2.4 MG/DL (ref 1.6–2.3)
PHOSPHATE SERPL-MCNC: 2.5 MG/DL (ref 2.5–4.5)
PTH-INTACT SERPL-MCNC: 116 PG/ML (ref 18–80)

## 2020-07-01 PROCEDURE — 84100 ASSAY OF PHOSPHORUS: CPT | Performed by: INTERNAL MEDICINE

## 2020-07-01 PROCEDURE — 83970 ASSAY OF PARATHORMONE: CPT | Performed by: INTERNAL MEDICINE

## 2020-07-01 PROCEDURE — 36415 COLL VENOUS BLD VENIPUNCTURE: CPT | Performed by: INTERNAL MEDICINE

## 2020-07-01 PROCEDURE — 82306 VITAMIN D 25 HYDROXY: CPT | Performed by: INTERNAL MEDICINE

## 2020-07-01 PROCEDURE — 82565 ASSAY OF CREATININE: CPT | Performed by: INTERNAL MEDICINE

## 2020-07-01 PROCEDURE — 82310 ASSAY OF CALCIUM: CPT | Performed by: INTERNAL MEDICINE

## 2020-07-01 PROCEDURE — 82330 ASSAY OF CALCIUM: CPT | Performed by: INTERNAL MEDICINE

## 2020-07-01 PROCEDURE — 83735 ASSAY OF MAGNESIUM: CPT | Performed by: INTERNAL MEDICINE

## 2020-07-02 LAB — DEPRECATED CALCIDIOL+CALCIFEROL SERPL-MC: 32 UG/L (ref 20–75)

## 2020-07-08 ENCOUNTER — VIRTUAL VISIT (OUTPATIENT)
Dept: ENDOCRINOLOGY | Facility: CLINIC | Age: 75
End: 2020-07-08
Payer: COMMERCIAL

## 2020-07-08 DIAGNOSIS — E21.3 HYPERPARATHYROIDISM (H): ICD-10-CM

## 2020-07-08 DIAGNOSIS — E83.52 HYPERCALCEMIA: ICD-10-CM

## 2020-07-08 DIAGNOSIS — M81.8 OTHER OSTEOPOROSIS WITHOUT CURRENT PATHOLOGICAL FRACTURE: Primary | ICD-10-CM

## 2020-07-08 PROCEDURE — 99214 OFFICE O/P EST MOD 30 MIN: CPT | Mod: 95 | Performed by: INTERNAL MEDICINE

## 2020-07-08 NOTE — PROGRESS NOTES
"This is a telephone encounter.  Pt declined video.   was present.    Start time: 11:37    End time: 12:01    More than 10 min spent reviewing chart, labs, results, imaging studies and in patient communication.    In the setting of COVID-19 outbreak patients visits are transitioned to phone visits/ virtual visits as per system and leadership guidelines.  I have personally reviewed data including notes, lab tests. I have reviewed and interpreted images personally.  This encounter is potentially equivalent to established 4 level (84572) clinic visit.    The patient has been notified of following:      \"This telephone visit will be conducted via a call between you and your physician/provider. We have found that certain health care needs can be provided without the need for a physical exam.  This service lets us provide the care you need with a short phone conversation.  If a prescription is necessary we can send it directly to your pharmacy.  If lab work is needed we can place an order for that and you can then stop by our lab to have the test done at a later time.     We will bill your insurance company for this service.  Please check with your medical insurance if this type of visit is covered. You may be responsible for the cost of this type of visit if insurance coverage is denied.  The typical cost is $30 (10min), $59 (11-20min) and $85 (21-30min).  Most often these visits are shorter than 10 minutes. With new updates with corona virus patient might be billed as clinic visit.     If during the course of the call the physician/provider feels a telephone visit is not appropriate, you will not be charged for this service.\"      Past medical history, social history, family history, allergy and medications were reviewed and updated as appropriate.  Reviewed all pertinent labs, notes and imaging studies as appropriate.    Patient verbally consented to phone visit today: yes.    Disclaimer: This note consists " of symbols derived from keyboarding, dictation and/or voice recognition software. As a result, there may be errors in the script that have gone undetected. Please consider this when interpreting information found in this chart.        ROS neg expect noted in notes.  Patient feels well at this time and denies any tachycardia, palpitations, heat intolerance, tremor, insomnia, diarrhea, or unexplained weight loss.  Patient also denies  cold intolerance, constipation, or unexplained weight gain.   Name: Devyn Link  Seen for f/u of hyperPTH. Last visit 1/2020.  HPI:  Devyn Link is a 74 year old female who presents for the management of hyperPTH.  High calcium has been noted since 2007 on and off.  Other past medical history include CAD, history of aortic valve replacement, Hypertension, mitral valve replacement, pacemaker in place, prediabetes, osteoporosis without current pathological fracture.  Was on Amiodarone in past and now off > 6 months. Was on it for about 2-3 years.  DEXA 2017 consistent with osteoporosis. On fosamax since 11/2018.    Repeat labs showed high  Ca, high PTH, normal creatinine, 24 hr urine calcium.  Parathyroid Scan did not identify adenoma.  12/2019 labs showing normal calcium, kidney function and vitamin D levels.  Parathyroid hormones slightly high but improving.    Energy levels vary but no change since last seen.  Arms and back is hurting.  Sometimes HA.    History of Cancer:No  Thiazide Diuretic:No  Lithium use:No  Family History of pituitary adenoma, pancreas tumors, Zollinger-Diaz syndrome, pheochromocytoma. No  Kidney stones:No  Fractures: No. DEXA c/w osteoporosis- on fosamax since 11/2018.  Abdominal Pain:Yes (Please explain): sometimes. H/o peptic ulcer. Is followed up by GI.  Cramps: No  Constipation: no   Muscle Aches or pains: No  Muscle twitches: No  Nausea and vomiting: no  Confusion Lethargy and fatigue: No confusion or lethargy, fatigue off and on   ON medications like Lithium/  HCTZ: no  Average Daily Calcium intake: dairy- not much  Ca and Vit D supplementation: calcium 500 mg and vit D- but not sure about the dose.  PMH/PSH:  Past Medical History:   Diagnosis Date     CAD (coronary artery disease) 12/13/2011    Followed every 6 months by Dr. Torre, [facility]      Chronic atrial fibrillation (H) 4/11/2013     Hyperlipidemia LDL goal <70 12/13/2011     Hypertension goal BP (blood pressure) < 140/90 12/14/2011     Myocardial infarction (H)      Pacemaker      Past Surgical History:   Procedure Laterality Date     C REPLACEMENT OF MITRAL VALVE  1994    Done at [facility]     COLONOSCOPY N/A 6/13/2018    Procedure: COLONOSCOPY;;  Surgeon: Rosario Alvarez MD;  Location:  GI     ESOPHAGOSCOPY, GASTROSCOPY, DUODENOSCOPY (EGD), COMBINED  06/13/2018    Dr. Alvarez [facility]     ESOPHAGOSCOPY, GASTROSCOPY, DUODENOSCOPY (EGD), COMBINED N/A 6/13/2018    Procedure: COMBINED ESOPHAGOSCOPY, GASTROSCOPY, DUODENOSCOPY (EGD), BIOPSY SINGLE OR MULTIPLE;  COMBINED ESOPHAGOSCOPY, GASTROSCOPY, DUODENOSCOPY (EGD) with biopsies COLONOSCOPY   ;  Surgeon: Rosario Alvarez MD;  Location:  GI     HEART CATH DRUG ELUTING STENT PLACEMENT  2004/2007    seeing Cardiologist at [facility]     SURGICAL HISTORY OF -   ~ 1995    mechanical aortic valve     SURGICAL HISTORY OF -       pacemaker     SURGICAL HISTORY OF -       mechanical mitral valve     Family Hx:  Family History   Problem Relation Age of Onset     Hypertension Mother      Cancer Mother         lung     Heart Disease Mother      Cerebrovascular Disease Mother      Hypertension Sister      Colon Cancer No family hx of             Social Hx:  Social History     Socioeconomic History     Marital status:      Spouse name: Not on file     Number of children: Not on file     Years of education: Not on file     Highest education level: Not on file   Occupational History     Not on file   Social Needs     Financial resource strain: Not on file      Food insecurity     Worry: Not on file     Inability: Not on file     Transportation needs     Medical: Not on file     Non-medical: Not on file   Tobacco Use     Smoking status: Never Smoker     Smokeless tobacco: Never Used   Substance and Sexual Activity     Alcohol use: No     Drug use: No     Sexual activity: Yes     Partners: Male   Lifestyle     Physical activity     Days per week: Not on file     Minutes per session: Not on file     Stress: Not on file   Relationships     Social connections     Talks on phone: Not on file     Gets together: Not on file     Attends [employer] service: Not on file     Active member of club or organization: Not on file     Attends meetings of clubs or organizations: Not on file     Relationship status: Not on file     Intimate partner violence     Fear of current or ex partner: Not on file     Emotionally abused: Not on file     Physically abused: Not on file     Forced sexual activity: Not on file   Other Topics Concern     Parent/sibling w/ CABG, MI or angioplasty before 65F 55M? Yes   Social History Narrative     Not on file          MEDICATIONS:  has a current medication list which includes the following prescription(s): alendronate, amoxicillin, aspirin, atorvastatin, carvedilol, ferrous sulfate, furosemide, losartan, nitroglycerin, pantoprazole, and warfarin anticoagulant.    ROS     ROS: 10 point ROS neg other than the symptoms noted above in the HPI.    Physical Exam   VS: LMP  (LMP Unknown)    LMP  (LMP Unknown)     LABS:  ENDO CALCIUM LABS-[facility] Latest Ref Rng & Units 9/27/2018   CALCIUM URINE G/24 H 0.10 - 0.30 g/24 h 0.17   CALCIUM URINE G/G CR g/g Cr 0.30   CALCIUM URINE MG/DL mg/dL 9.3     Calcium:  ENDO CALCIUM LABS-[facility] Latest Ref Rng & Units 7/1/2020 1/14/2020   CALCIUM 8.5 - 10.1 mg/dL 9.7 9.6     ENDO CALCIUM LABS-[facility] Latest Ref Rng & Units 1/2/2020   CALCIUM 8.5 - 10.1 mg/dL 9.5     ENDO CALCIUM LABS-[facility] Latest Ref Rng & Units 9/12/2019   CALCIUM 8.5 - 10.1  mg/dL 9.8   CALCIUM IONIZED 4.4 - 5.2 mg/dL 5.3 (H)     ENDO CALCIUM LABS-[facility] Latest Ref Rng & Units 3/13/2019   CALCIUM 8.5 - 10.1 mg/dL 10.5 (H)   CALCIUM IONIZED 4.4 - 5.2 mg/dL 5.3 (H)     ENDO CALCIUM LABS-[facility] Latest Ref Rng & Units 10/25/2018 9/27/2018   CALCIUM 8.5 - 10.1 mg/dL 10.9 (H)      ENDO CALCIUM LABS-UMP Latest Ref Rng & Units 9/26/2018   CALCIUM 8.5 - 10.1 mg/dL 10.1     ENDO CALCIUM LABS-[facility] Latest Ref Rng & Units 9/10/2018 8/17/2018   CALCIUM 8.5 - 10.1 mg/dL 11.0 (H) 9.8     ENDO CALCIUM LABS-UMP Latest Ref Rng & Units 6/22/2018 6/14/2018   CALCIUM 8.5 - 10.1 mg/dL 10.2 (H)      ENDO CALCIUM LABS-UMP Latest Ref Rng & Units 6/5/2018   CALCIUM 8.5 - 10.1 mg/dL 10.6 (H)     PTH:  ENDO CALCIUM LABS-UMP Latest Ref Rng & Units 7/1/2020   PARATHYROID HORMONE INTACT 18 - 80 pg/mL 116 (H)     ENDO CALCIUM LABS-UMP Latest Ref Rng & Units 1/2/2020   PARATHYROID HORMONE INTACT 18 - 80 pg/mL 105 (H)     Component 9/12/2019   Parathyroid Hormone Intact      18 - 80 pg/mL 110 (H)     ENDO CALCIUM LABS-UMP Latest Ref Rng & Units 3/13/2019   PARATHYROID HORMONE INTACT 18 - 80 pg/mL 142 (H)     ENDO CALCIUM LABS-UMP Latest Ref Rng & Units 9/26/2018   PARATHYROID HORMONE INTACT 18 - 80 pg/mL 147 (H)     ENDO CALCIUM LABS-UMP Latest Ref Rng & Units 6/22/2018   PARATHYROID HORMONE INTACT 18 - 80 pg/mL 97 (H)     Vitamin D:   Lab Results   Component Value Date    VITDT 32 07/01/2020    VITDT 30 01/02/2020    VITDT 33 09/12/2019    VITDT 28 03/13/2019    VITDT 37 09/26/2018     TFTs:  TSH   Date Value Ref Range Status   04/02/2018 0.87 0.35 - 4.94 uIU/mL Final       DEXA 10/2017:  REFERENCE T-SCORES:       Normal                -1.0 and greater                                 Osteopenia         Between -1.0 and -2.5                                           Osteoporosis     -2.5 and less                                       RISK FACTORS:  Post-menopausal, Height loss of 1.75 inches, Follow-up osteopenia, Low body  weight     CURRENT TREATMENT:  Calcium, Vitamin D      FINDINGS:               Lumbar Spine (L1-L2; due to significant degenerative changes at L3 and L4, these were omitted)      T-score:  -3.3               Left Femoral Neck                      T-score:  -2.9               Right Femoral Neck                    T-score:  -3.0                             Lumbar (L1-L4) BMD: 0.876                                                           Total Hip Mean BMD: 0.663                    LATERAL VERTEBRAL ASSESSMENT  Procedure:  Vertebral fracture assessment was performed in the lateral decubitus position using a ePantry Prodigy  densitometer.  Indications for VFA: T-score of -1.0 or worse and age (female>69)  Confounding factors for VFA: Arthritis/degenerative disc disease, rib shadows.  The LVA scan is interpretable from T7 to L4.     VFA Findings: Using the semi-quantitative analysis of [employer] there was evidence of no spinal deformity  VFA Impression: Devyn Link has no vertebral fractures identified on the VFA.          IMPRESSION  Osteoporosis  Degenerative changes of the spine  Recommendations include ensuring adequate daily Calcium and Vitamin D intake  Follow up scan can be considered in three years.     Patient had a study performed previously, however the prior images are not available to compare to the current study.     Current NOF guidelines recommend treatment for patients with the following:  - Prior hip or vertebral fracture  - T-score -2.5 or below  - A 10 year risk of any major osteoporotic fracture >20% or 10 year risk of hip fracture >3%, as calculated using the FRAX calculator (www.shef.ac.uk/FRAX).       Based on these guidelines, treatment (in addition to calcium and vitamin D) is recommended for this patient, after ruling out other causes of osteoporosis/low bone density.  While this is meant as an aid to clinical decision-making, clinical judgment must still be used.     NM PARATHYROID PLANAR W/SPECT  AND CT DUAL   11/20/2018      TECHNIQUE:  766uci I123 PO; 24.6mCi Tc99m Sestamibi injected in the  right hand @1442.     HISTORY:  Hypercalcemia. Hyperparathyroidism.     COMPARISON:   Nuclear Study: None.  Other Relevant Studies: None.     FINDINGS:  Planar subtraction images show a normal appearance of the  thyroid gland with no focal lesion to indicate a parathyroid adenoma.  Likewise, the fused SPECT-CT images show no focal lesion or activity  to suggest a parathyroid adenoma. Noncontrast CT scan shows no  evidence of a parathyroid adenoma. 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         IMPRESSION: No nuclear or CT evidence of a parathyroid adenoma.  All pertinent notes, labs, and images personally reviewed by me.     A/P  Ms.Xiang Link is a 74 year old here for the management of:    1. Hyperparathyroidism:    Recent labs stable.  Results reviewed in detail.  High calcium levels noted on and off since 2007.  More recently calcium is in range. Slightly elevated parathyroid hormone  Kidney function is in normal range  Vitamin D in normal range  Not on thaizide diuretic or lithium.  History of osteoporosis based on DEXA scan done 2017. On fosamax since 11/2018.    History of peptic ulcer.  Followed by GI. Gastrin slightly high- in the setting of PPI use.  7/2020 labs showing normal calcium, kidney function and vitamin D levels.  Parathyroid hormones slightly high but improving.  Plan:  Discussed diagnosis, pathophysiology, management and treatment options of condition with pt.  Discussed close monitoring of labs at periodic interval vs neck exploration for parathyroid adenoma ( in the setting of osteoporosis).  At this time she still prefers to continue to monitor.  Plan to repeat labs in 6 months.    Discussed s/s of  hypercalcemia and recommend adequate hydration.       2.  Osteoporosis:  DEXA 2017 c/w osteoporosis.  On fosamax since 11/2018.  Tolerating well.  History of peptic ulcer.  Followed by GI.   Plan:  Discussed diagnosis, pathophysiology, management and treatment options of condition with pt.  Continue Fosamax 70 mg ONCE a week (started 11/21/2018)  Bone density scan in 11/2020 at [place].  Follow up after above.    The pt was advised to    Maintain an adequate calcium and vitamin D intake and to supplement vitamin D if needed to maintain serum levels of 25 hydroxy D (25 OH D) between 30-60 ng/ml.    Limit alcohol intake to no more than 2 servings per day.    Limit caffeine intake.    Maintain an active lifestyle including weight-bearing exercises for at least 30 mins daily.    Take measures to reduce the risk of falling.  Discussed indications, risks and benefits of all medications prescribed, and answered questions to patient's satisfaction.  Instructions on Fosamax use and side effects - particularly esophageal adverse events - are carefully reviewed with her. This drug must be taken upon arising for the day on an empty stomach, with a large 6-8 ounce glass of water; she must remain NPO in the upright position for at least 30 minutes afterwards and until after the first food of the day. If esophageal irritation is noted, she will stop the drug and call my office.  Treatment with bisphosphonate therapy will decrease fracture risk 50-70%.   There is risk of osteonecrosis of the jaw in patients using bisphosphonates is approximately 1/1700-1/100,000, with development most likely related to invasive dental procedures. If an invasive dental procedure was necessary, preferably stop the bisphosphonate approximately 3 months prior to reduce the risk. Let your dentist know that you are on this medication.  The data available at this time suggests that there is probably a small increase risk of atypical (nontraumatic)  subtrochanteric fractures of the femur in patients on bisphosphonate therapy compared to those not on it. One large study suggested that for every 100 fractures prevented with bisphosphonate therapy, less than one femur fracture will occur. Other studies suggest one episode per 2,500 patient years. Patient should call with leg pain.      The patient indicates understanding of these issues and agrees with the plan.      Guidelines for parathyroid surgery in asymptomatic primary hyperparathyroidism:    Serum Calcium >1.0 mg/dl (0.25 mmol/L) above normal   Creatinine Clearance (calculated) Below 60m1/min /1.73 m2)   Bone Mineral Density T score < -2.5 SD at spine, hip (total or femoral neck) or radius (distal 1/3 site) or presence of fragility fracture   Age Age < 50 years        PARATHYROID GLAND IMAGING  Pre- operative imaging is of value prior to surgery in the localization of abnormal parathyroid tissue . The diagnosis of PHPT is based on the biochemical findings and is not affected by the results of the imaging studies. Sestamibi imaging is of value in localizing abnormal parathyroid tissue. The sensitivity and specificity varies among institutions.    Follow-up:  Nov 2020.    Keisha Castro MD refrigerator hold both the microwave and the representative ranges  [facility]  7/8/2020    CC: Prisca Lucero MD

## 2020-07-08 NOTE — LETTER
"    7/8/2020         RE: Devyn Link  [address] [facility] [address]        Dear Colleague,    Thank you for referring your patient, Devyn Link, to the [facility]. Please see a copy of my visit note below.    This is a telephone encounter.  Pt declined video.   was present.    Start time: 11:37    End time: 12:01    More than 10 min spent reviewing chart, labs, results, imaging studies and in patient communication.    In the setting of COVID-19 outbreak patients visits are transitioned to phone visits/ virtual visits as per system and leadership guidelines.  I have personally reviewed data including notes, lab tests. I have reviewed and interpreted images personally.  This encounter is potentially equivalent to established 4 level (02050) clinic visit.    The patient has been notified of following:      \"This telephone visit will be conducted via a call between you and your physician/provider. We have found that certain health care needs can be provided without the need for a physical exam.  This service lets us provide the care you need with a short phone conversation.  If a prescription is necessary we can send it directly to your pharmacy.  If lab work is needed we can place an order for that and you can then stop by our lab to have the test done at a later time.     We will bill your insurance company for this service.  Please check with your medical insurance if this type of visit is covered. You may be responsible for the cost of this type of visit if insurance coverage is denied.  The typical cost is $30 (10min), $59 (11-20min) and $85 (21-30min).  Most often these visits are shorter than 10 minutes. With new updates with corona virus patient might be billed as clinic visit.     If during the course of the call the physician/provider feels a telephone visit is not appropriate, you will not be charged for this service.\"      Past medical history, social history, " family history, allergy and medications were reviewed and updated as appropriate.  Reviewed all pertinent labs, notes and imaging studies as appropriate.    Patient verbally consented to phone visit today: yes.    Disclaimer: This note consists of symbols derived from keyboarding, dictation and/or voice recognition software. As a result, there may be errors in the script that have gone undetected. Please consider this when interpreting information found in this chart.        ROS neg expect noted in notes.  Patient feels well at this time and denies any tachycardia, palpitations, heat intolerance, tremor, insomnia, diarrhea, or unexplained weight loss.  Patient also denies  cold intolerance, constipation, or unexplained weight gain.   Name: Devyn Link  Seen for f/u of hyperPTH. Last visit 1/2020.  HPI:  Devyn Link is a 74 year old female who presents for the management of hyperPTH.  High calcium has been noted since 2007 on and off.  Other past medical history include CAD, history of aortic valve replacement, Hypertension, mitral valve replacement, pacemaker in place, prediabetes, osteoporosis without current pathological fracture.  Was on Amiodarone in past and now off > 6 months. Was on it for about 2-3 years.  DEXA 2017 consistent with osteoporosis. On fosamax since 11/2018.    Repeat labs showed high  Ca, high PTH, normal creatinine, 24 hr urine calcium.  Parathyroid Scan did not identify adenoma.  12/2019 labs showing normal calcium, kidney function and vitamin D levels.  Parathyroid hormones slightly high but improving.    Energy levels vary but no change since last seen.  Arms and back is hurting.  Sometimes HA.    History of Cancer:No  Thiazide Diuretic:No  Lithium use:No  Family History of pituitary adenoma, pancreas tumors, Zollinger-Diaz syndrome, pheochromocytoma. No  Kidney stones:No  Fractures: No. DEXA c/w osteoporosis- on fosamax since 11/2018.  Abdominal Pain:Yes (Please explain): sometimes. H/o  peptic ulcer. Is followed up by GI.  Cramps: No  Constipation: no   Muscle Aches or pains: No  Muscle twitches: No  Nausea and vomiting: no  Confusion Lethargy and fatigue: No confusion or lethargy, fatigue off and on   ON medications like Lithium/ HCTZ: no  Average Daily Calcium intake: dairy- not much  Ca and Vit D supplementation: calcium 500 mg and vit D- but not sure about the dose.  PMH/PSH:  Past Medical History:   Diagnosis Date     CAD (coronary artery disease) 12/13/2011    Followed every 6 months by Dr. Torre, [facility]      Chronic atrial fibrillation (H) 4/11/2013     Hyperlipidemia LDL goal <70 12/13/2011     Hypertension goal BP (blood pressure) < 140/90 12/14/2011     Myocardial infarction (H)      Pacemaker      Past Surgical History:   Procedure Laterality Date     C REPLACEMENT OF MITRAL VALVE  1994    [facility] at [facility]     COLONOSCOPY N/A 6/13/2018    Procedure: COLONOSCOPY;;  Surgeon: Rosario Alvarez MD;  Location:  GI     ESOPHAGOSCOPY, GASTROSCOPY, DUODENOSCOPY (EGD), COMBINED  06/13/2018    Dr. Alvarez [facility]     ESOPHAGOSCOPY, GASTROSCOPY, DUODENOSCOPY (EGD), COMBINED N/A 6/13/2018    Procedure: COMBINED ESOPHAGOSCOPY, GASTROSCOPY, DUODENOSCOPY (EGD), BIOPSY SINGLE OR MULTIPLE;  COMBINED ESOPHAGOSCOPY, GASTROSCOPY, DUODENOSCOPY (EGD) with biopsies COLONOSCOPY   ;  Surgeon: Rosario Alvarez MD;  Location:  GI     HEART CATH DRUG ELUTING STENT PLACEMENT  2004/2007    seeing Cardiologist at [facility]     SURGICAL HISTORY OF -   ~ 1995    mechanical aortic valve     SURGICAL HISTORY OF -       pacemaker     SURGICAL HISTORY OF -       mechanical mitral valve     Family Hx:  Family History   Problem Relation Age of Onset     Hypertension Mother      Cancer Mother         lung     Heart Disease Mother      Cerebrovascular Disease Mother      Hypertension Sister      Colon Cancer No family hx of             Social Hx:  Social History     Socioeconomic History     Marital status:       Spouse name: Not on file     Number of children: Not on file     Years of education: Not on file     Highest education level: Not on file   Occupational History     Not on file   Social Needs     Financial resource strain: Not on file     Food insecurity     Worry: Not on file     Inability: Not on file     Transportation needs     Medical: Not on file     Non-medical: Not on file   Tobacco Use     Smoking status: Never Smoker     Smokeless tobacco: Never Used   Substance and Sexual Activity     Alcohol use: No     Drug use: No     Sexual activity: Yes     Partners: Male   Lifestyle     Physical activity     Days per week: Not on file     Minutes per session: Not on file     Stress: Not on file   Relationships     Social connections     Talks on phone: Not on file     Gets together: Not on file     Attends Quaker service: Not on file     Active member of club or organization: Not on file     Attends meetings of clubs or organizations: Not on file     Relationship status: Not on file     Intimate partner violence     Fear of current or ex partner: Not on file     Emotionally abused: Not on file     Physically abused: Not on file     Forced sexual activity: Not on file   Other Topics Concern     Parent/sibling w/ CABG, MI or angioplasty before 65F 55M? Yes   Social History Narrative     Not on file          MEDICATIONS:  has a current medication list which includes the following prescription(s): alendronate, amoxicillin, aspirin, atorvastatin, carvedilol, ferrous sulfate, furosemide, losartan, nitroglycerin, pantoprazole, and warfarin anticoagulant.    ROS     ROS: 10 point ROS neg other than the symptoms noted above in the HPI.    Physical Exam   VS: LMP  (LMP Unknown)    LMP  (LMP Unknown)     LABS:  ENDO CALCIUM LABS-UMP Latest Ref Rng & Units 9/27/2018   CALCIUM URINE G/24 H 0.10 - 0.30 g/24 h 0.17   CALCIUM URINE G/G CR g/g Cr 0.30   CALCIUM URINE MG/DL mg/dL 9.3     Calcium:  ENDO CALCIUM LABS-UMP  Latest Ref Rng & Units 7/1/2020 1/14/2020   CALCIUM 8.5 - 10.1 mg/dL 9.7 9.6     ENDO CALCIUM LABS-UMP Latest Ref Rng & Units 1/2/2020   CALCIUM 8.5 - 10.1 mg/dL 9.5     ENDO CALCIUM LABS-UMP Latest Ref Rng & Units 9/12/2019   CALCIUM 8.5 - 10.1 mg/dL 9.8   CALCIUM IONIZED 4.4 - 5.2 mg/dL 5.3 (H)     ENDO CALCIUM LABS-UMP Latest Ref Rng & Units 3/13/2019   CALCIUM 8.5 - 10.1 mg/dL 10.5 (H)   CALCIUM IONIZED 4.4 - 5.2 mg/dL 5.3 (H)     ENDO CALCIUM LABS-UMP Latest Ref Rng & Units 10/25/2018 9/27/2018   CALCIUM 8.5 - 10.1 mg/dL 10.9 (H)      ENDO CALCIUM LABS-UMP Latest Ref Rng & Units 9/26/2018   CALCIUM 8.5 - 10.1 mg/dL 10.1     ENDO CALCIUM LABS-UMP Latest Ref Rng & Units 9/10/2018 8/17/2018   CALCIUM 8.5 - 10.1 mg/dL 11.0 (H) 9.8     ENDO CALCIUM LABS-UMP Latest Ref Rng & Units 6/22/2018 6/14/2018   CALCIUM 8.5 - 10.1 mg/dL 10.2 (H)      ENDO CALCIUM LABS-UMP Latest Ref Rng & Units 6/5/2018   CALCIUM 8.5 - 10.1 mg/dL 10.6 (H)     PTH:  ENDO CALCIUM LABS-UMP Latest Ref Rng & Units 7/1/2020   PARATHYROID HORMONE INTACT 18 - 80 pg/mL 116 (H)     ENDO CALCIUM LABS-UMP Latest Ref Rng & Units 1/2/2020   PARATHYROID HORMONE INTACT 18 - 80 pg/mL 105 (H)     Component 9/12/2019   Parathyroid Hormone Intact      18 - 80 pg/mL 110 (H)     ENDO CALCIUM LABS-UMP Latest Ref Rng & Units 3/13/2019   PARATHYROID HORMONE INTACT 18 - 80 pg/mL 142 (H)     ENDO CALCIUM LABS-UMP Latest Ref Rng & Units 9/26/2018   PARATHYROID HORMONE INTACT 18 - 80 pg/mL 147 (H)     ENDO CALCIUM LABS-UMP Latest Ref Rng & Units 6/22/2018   PARATHYROID HORMONE INTACT 18 - 80 pg/mL 97 (H)     Vitamin D:   Lab Results   Component Value Date    VITDT 32 07/01/2020    VITDT 30 01/02/2020    VITDT 33 09/12/2019    VITDT 28 03/13/2019    VITDT 37 09/26/2018     TFTs:  TSH   Date Value Ref Range Status   04/02/2018 0.87 0.35 - 4.94 uIU/mL Final       DEXA 10/2017:  REFERENCE T-SCORES:       Normal                -1.0 and greater                                  Osteopenia         Between -1.0 and -2.5                                           Osteoporosis     -2.5 and less                                       RISK FACTORS:  Post-menopausal, Height loss of 1.75 inches, Follow-up osteopenia, Low body weight     CURRENT TREATMENT:  Calcium, Vitamin D      FINDINGS:               Lumbar Spine (L1-L2; due to significant degenerative changes at L3 and L4, these were omitted)      T-score:  -3.3               Left Femoral Neck                      T-score:  -2.9               Right Femoral Neck                    T-score:  -3.0                             Lumbar (L1-L4) BMD: 0.876                                                           Total Hip Mean BMD: 0.663                    LATERAL VERTEBRAL ASSESSMENT  Procedure:  Vertebral fracture assessment was performed in the lateral decubitus position using a [employer] Prodigy  densitometer.  Indications for VFA: T-score of -1.0 or worse and age (female>69)  Confounding factors for VFA: Arthritis/degenerative disc disease, rib shadows.  The LVA scan is interpretable from T7 to L4.     VFA Findings: Using the semi-quantitative analysis of [employer] there was evidence of no spinal deformity  VFA Impression: Devyn Link has no vertebral fractures identified on the VFA.          IMPRESSION  Osteoporosis  Degenerative changes of the spine  Recommendations include ensuring adequate daily Calcium and Vitamin D intake  Follow up scan can be considered in three years.     Patient had a study performed previously, however the prior images are not available to compare to the current study.     Current NOF guidelines recommend treatment for patients with the following:  - Prior hip or vertebral fracture  - T-score -2.5 or below  - A 10 year risk of any major osteoporotic fracture >20% or 10 year risk of hip fracture >3%, as calculated using the FRAX calculator (www.shef.ac.uk/FRAX).       Based on these guidelines,  treatment (in addition to calcium and vitamin D) is recommended for this patient, after ruling out other causes of osteoporosis/low bone density.  While this is meant as an aid to clinical decision-making, clinical judgment must still be used.     NM PARATHYROID PLANAR W/SPECT AND CT DUAL   11/20/2018      TECHNIQUE:  766uci I123 PO; 24.6mCi Tc99m Sestamibi injected in the  right hand @1442.     HISTORY:  Hypercalcemia. Hyperparathyroidism.     COMPARISON:   Nuclear Study: None.  Other Relevant Studies: None.     FINDINGS:  Planar subtraction images show a normal appearance of the  thyroid gland with no focal lesion to indicate a parathyroid adenoma.  Likewise, the fused SPECT-CT images show no focal lesion or activity  to suggest a parathyroid adenoma. Noncontrast CT scan shows no  evidence of a parathyroid adenoma. 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         IMPRESSION: No nuclear or CT evidence of a parathyroid adenoma.  All pertinent notes, labs, and images personally reviewed by me.     A/P  Ms.Xiang Link is a 74 year old here for the management of:    1. Hyperparathyroidism:    Recent labs stable.  Results reviewed in detail.  High calcium levels noted on and off since 2007.  More recently calcium is in range. Slightly elevated parathyroid hormone  Kidney function is in normal range  Vitamin D in normal range  Not on thaizide diuretic or lithium.  History of osteoporosis based on DEXA scan done 2017. On fosamax since 11/2018.    History of peptic ulcer.  Followed by GI. Gastrin slightly high- in the setting of PPI use.  7/2020 labs showing normal calcium, kidney function and vitamin D levels.  Parathyroid hormones slightly high but improving.  Plan:  Discussed diagnosis, pathophysiology, management and treatment  options of condition with pt.  Discussed close monitoring of labs at periodic interval vs neck exploration for parathyroid adenoma ( in the setting of osteoporosis).  At this time she still prefers to continue to monitor.  Plan to repeat labs in 6 months.    Discussed s/s of hypercalcemia and recommend adequate hydration.       2.  Osteoporosis:  DEXA 2017 c/w osteoporosis.  On fosamax since 11/2018.  Tolerating well.  History of peptic ulcer.  Followed by GI.   Plan:  Discussed diagnosis, pathophysiology, management and treatment options of condition with pt.  Continue Fosamax 70 mg ONCE a week (started 11/21/2018)  Bone density scan in 11/2020 at [place].  Follow up after above.    The pt was advised to    Maintain an adequate calcium and vitamin D intake and to supplement vitamin D if needed to maintain serum levels of 25 hydroxy D (25 OH D) between 30-60 ng/ml.    Limit alcohol intake to no more than 2 servings per day.    Limit caffeine intake.    Maintain an active lifestyle including weight-bearing exercises for at least 30 mins daily.    Take measures to reduce the risk of falling.  Discussed indications, risks and benefits of all medications prescribed, and answered questions to patient's satisfaction.  Instructions on Fosamax use and side effects - particularly esophageal adverse events - are carefully reviewed with her. This drug must be taken upon arising for the day on an empty stomach, with a large 6-8 ounce glass of water; she must remain NPO in the upright position for at least 30 minutes afterwards and until after the first food of the day. If esophageal irritation is noted, she will stop the drug and call my office.  Treatment with bisphosphonate therapy will decrease fracture risk 50-70%.   There is risk of osteonecrosis of the jaw in patients using bisphosphonates is approximately 1/1700-1/100,000, with development most likely related to invasive dental procedures. If an invasive dental procedure  was necessary, preferably stop the bisphosphonate approximately 3 months prior to reduce the risk. Let your dentist know that you are on this medication.  The data available at this time suggests that there is probably a small increase risk of atypical (nontraumatic) subtrochanteric fractures of the femur in patients on bisphosphonate therapy compared to those not on it. One large study suggested that for every 100 fractures prevented with bisphosphonate therapy, less than one femur fracture will occur. Other studies suggest one episode per 2,500 patient years. Patient should call with leg pain.      The patient indicates understanding of these issues and agrees with the plan.      Guidelines for parathyroid surgery in asymptomatic primary hyperparathyroidism:    Serum Calcium >1.0 mg/dl (0.25 mmol/L) above normal   Creatinine Clearance (calculated) Below 60m1/min /1.73 m2)   Bone Mineral Density T score < -2.5 SD at spine, hip (total or femoral neck) or radius (distal 1/3 site) or presence of fragility fracture   Age Age < 50 years        PARATHYROID GLAND IMAGING  Pre- operative imaging is of value prior to surgery in the localization of abnormal parathyroid tissue . The diagnosis of PHPT is based on the biochemical findings and is not affected by the results of the imaging studies. Sestamibi imaging is of value in localizing abnormal parathyroid tissue. The sensitivity and specificity varies among institutions.    Follow-up:  Nov 2020.    Keisha Castro MD refrigerator hold both the microwave and the representative ranges  [facility]  7/8/2020    CC: Prisca Lucero MD        Again, thank you for allowing me to participate in the care of your patient.        Sincerely,        Keisha Castro MD

## 2020-07-08 NOTE — PATIENT INSTRUCTIONS
[facility]   Dr Castro, Endocrinology Department      [facility]   [address]  [place] [address]  Appointment [phone]  Fax: [phone]  [place]: Monday and Tuesday         [facility]   [address]  [place] [address]  Appointment [phone]  Fax: [phone]  [place]: Wednesday and Thursday            Please check the cost coverage and copay with insurance before recommended tests, services and medications (especially if new medications are prescribed).     If ordered, please get blood work done 1 week prior to your next appointment so they will be available to Dr. Castro at your visit.    Labs in 2020.  Continue FOSAMAX.  DEXA scan (bone density scan) in 2020 at [place] location.  Please make a lab appointment for blood work and follow up clinic appointment in 1 week after that to discuss results.  Follow up after that in clinic.    The pt was advised to    Maintain an adequate calcium and vitamin D intake and to supplement vitamin D if needed to maintain serum levels of 25 hydroxy D (25 OH D) between 30-60 ng/ml.    Limit alcohol intake to no more than 2 servings per day.    Limit caffeine intake.    Maintain an active lifestyle including weight-bearing exercises for at least 30 mins daily.    Take measures to reduce the risk of falling.

## 2020-07-13 ENCOUNTER — TRANSFERRED RECORDS (OUTPATIENT)
Dept: HEALTH INFORMATION MANAGEMENT | Facility: CLINIC | Age: 75
End: 2020-07-13

## 2020-09-09 ENCOUNTER — ANCILLARY PROCEDURE (OUTPATIENT)
Dept: BONE DENSITY | Facility: CLINIC | Age: 75
End: 2020-09-09
Attending: INTERNAL MEDICINE
Payer: COMMERCIAL

## 2020-09-09 DIAGNOSIS — M81.8 OTHER OSTEOPOROSIS WITHOUT CURRENT PATHOLOGICAL FRACTURE: ICD-10-CM

## 2020-09-09 DIAGNOSIS — Z87.898 HISTORY OF ULCER DISEASE: ICD-10-CM

## 2020-09-09 PROCEDURE — 77085 DXA BONE DENSITY AXL VRT FX: CPT | Performed by: INTERNAL MEDICINE

## 2020-09-10 PROBLEM — Z87.898 HISTORY OF ULCER DISEASE: Status: ACTIVE | Noted: 2020-09-10

## 2020-09-10 RX ORDER — PANTOPRAZOLE SODIUM 40 MG/1
TABLET, DELAYED RELEASE ORAL
Qty: 90 TABLET | Refills: 0 | Status: SHIPPED | OUTPATIENT
Start: 2020-09-10 | End: 2020-11-19

## 2020-09-10 NOTE — TELEPHONE ENCOUNTER
pantoprazole (PROTONIX) 40 MG EC tablet    Last Written Prescription Date:  6/29/20  Last Fill Quantity: 90,   # refills: 0  Last Office Visit: 1/14/20  Future Office visit:       Routing refill request to provider for review/approval because:  No diagnosis of osteoporosis on record    Lyly Ovalle RN on 9/10/2020 at 2:55 PM

## 2020-09-11 ENCOUNTER — TELEPHONE (OUTPATIENT)
Dept: ENDOCRINOLOGY | Facility: CLINIC | Age: 75
End: 2020-09-11

## 2020-09-11 DIAGNOSIS — M81.8 OTHER OSTEOPOROSIS WITHOUT CURRENT PATHOLOGICAL FRACTURE: Primary | ICD-10-CM

## 2020-09-11 NOTE — TELEPHONE ENCOUNTER
FINDINGS:               Lumbar Spine (L1-L2)      T-score:  -3.0, marked degenerative changes present at L3,4, so only L1,2 are evaluated.                Left Femoral Neck            T-score:  -3.1               Right Femoral Neck          T-score:  -3.0                  Lumbar (L1-L2) BMD: 0.812  Previous: 0.769                 Total Hip Mean BMD: 0.654  Previous: 0.663     Comparison is made to another DXA performed on a different [employer] machine on 10/9/2017.      LATERAL VERTEBRAL ASSESSMENT  Procedure:  Vertebral fracture assessment was performed in the lateral decubitus position using a [employer] Prodigy  densitometer.  Indications for VFA: T-score of -1.0 or worse and age (female>69)  Confounding factors for VFA: None.  The LVA scan is interpretable from T8 to L5.     VFA Findings: Using the semi-quantitative analysis of [employer] there was evidence of spinal deformity as follows:  moderate (grade 2) wedge fracture of L1. This is new compared to 2017.  VFA Impression: Devyn Link has one vertebral fracture identified on the LVA.  If alternative etiologies for the presence of vertebral fractures are excluded, the diagnosis is consistent with severe osteoporosis.         IMPRESSION  Severe osteoporosis on the basis of vertebral fracture.      Comparisons from different scanners that have not been cross calibrated, are not necessarily valid. Such a comparison has been performed here; one should interpret with caution.   There has been probable significant increase in bone density of the lumbar spine. There has been probably no significant change in bone density of the hip(s).        Last visit 7/2020.  New vertebral fracture seen.  Recommend lumbar spine Xray.  And follow up after that.  It is very important that she follow up in next few weeks.    Please help schedule virtual visit in next 1-3 weeks to discuss labs and next steps.  Let me know if you have any questions or if needs to be added on to schedule.    Please  inform patient.  Please call.      Thank you.    Keisha Castro MD

## 2020-09-21 ENCOUNTER — ANCILLARY PROCEDURE (OUTPATIENT)
Dept: GENERAL RADIOLOGY | Facility: CLINIC | Age: 75
End: 2020-09-21
Attending: INTERNAL MEDICINE
Payer: COMMERCIAL

## 2020-09-21 DIAGNOSIS — M81.8 OTHER OSTEOPOROSIS WITHOUT CURRENT PATHOLOGICAL FRACTURE: ICD-10-CM

## 2020-09-21 PROCEDURE — 72100 X-RAY EXAM L-S SPINE 2/3 VWS: CPT

## 2020-09-23 ENCOUNTER — VIRTUAL VISIT (OUTPATIENT)
Dept: ENDOCRINOLOGY | Facility: CLINIC | Age: 75
End: 2020-09-23
Payer: COMMERCIAL

## 2020-09-23 DIAGNOSIS — E83.52 HYPERCALCEMIA: ICD-10-CM

## 2020-09-23 DIAGNOSIS — M81.8 OTHER OSTEOPOROSIS WITHOUT CURRENT PATHOLOGICAL FRACTURE: Primary | ICD-10-CM

## 2020-09-23 DIAGNOSIS — E21.3 HYPERPARATHYROIDISM (H): ICD-10-CM

## 2020-09-23 PROCEDURE — 99214 OFFICE O/P EST MOD 30 MIN: CPT | Mod: 95 | Performed by: INTERNAL MEDICINE

## 2020-09-23 NOTE — PATIENT INSTRUCTIONS
[facility]   Dr Castro, Endocrinology Department      [facility]   [address]  [place] [address]  Appointment [phone]  Fax: [phone]  [place]: Monday and Tuesday         [facility]   [address]  [place] [address]  Appointment [phone]  Fax: [phone]  [place]: Wednesday and Thursday            Please check the cost coverage and copay with insurance before recommended tests, services and medications (especially if new medications are prescribed).     If ordered, please get blood work done 1 week prior to your next appointment so they will be available to Dr. Castro at your visit.    Continue Fosamax.  Repeat DEXA in 1-2 years.  Labs in Dec.  Please make a lab appointment for blood work and follow up clinic appointment in 1 week after that to discuss results.

## 2020-09-23 NOTE — LETTER
"    9/23/2020         RE: Devyn Link  [address] [facility] [id number]        Dear Colleague,    Thank you for referring your patient, Devyn Link, to the [facility]. Please see a copy of my visit note below.    This is a telephone encounter.  Pt declined video.   was present.  9/23/2020    Start time: 10:02    End time: 10:35    More than 10 min spent reviewing chart, labs, results, imaging studies and in patient communication.    In the setting of COVID-19 outbreak patients visits are transitioned to phone visits/ virtual visits as per system and leadership guidelines.  I have personally reviewed data including notes, lab tests. I have reviewed and interpreted images personally.  This encounter is potentially equivalent to established 4 level (90739) clinic visit.    The patient has been notified of following:      \"This telephone visit will be conducted via a call between you and your physician/provider. We have found that certain health care needs can be provided without the need for a physical exam.  This service lets us provide the care you need with a short phone conversation.  If a prescription is necessary we can send it directly to your pharmacy.  If lab work is needed we can place an order for that and you can then stop by our lab to have the test done at a later time.     We will bill your insurance company for this service.  Please check with your medical insurance if this type of visit is covered. You may be responsible for the cost of this type of visit if insurance coverage is denied.  The typical cost is $30 (10min), $59 (11-20min) and $85 (21-30min).  Most often these visits are shorter than 10 minutes. With new updates with corona virus patient might be billed as clinic visit.     If during the course of the call the physician/provider feels a telephone visit is not appropriate, you will not be charged for this service.\"      Past medical history, social " history, family history, allergy and medications were reviewed and updated as appropriate.  Reviewed all pertinent labs, notes and imaging studies as appropriate.    Patient verbally consented to phone visit today: yes.    Disclaimer: This note consists of symbols derived from keyboarding, dictation and/or voice recognition software. As a result, there may be errors in the script that have gone undetected. Please consider this when interpreting information found in this chart.        ROS neg expect noted in notes.  Patient feels well at this time and denies any tachycardia, palpitations, heat intolerance, tremor, insomnia, diarrhea, or unexplained weight loss.  Patient also denies  cold intolerance, constipation, or unexplained weight gain.   Name: Devyn Link  Seen for f/u of hyperPTH. Last visit 1/2020.  HPI:  Devyn Link is a 74 year old female who presents for the management of hyperPTH and osteoporosis.  Hyperparathyroidism:  High calcium has been noted since 2007 on and off.  Other past medical history include CAD, history of aortic valve replacement, Hypertension, mitral valve replacement, pacemaker in place, prediabetes, osteoporosis without current pathological fracture.  Was on Amiodarone in past and now off > 6 months. Was on it for about 2-3 years.  DEXA 2017 consistent with osteoporosis. On fosamax since 11/2018.    Repeat labs showed high  Ca, high PTH, normal creatinine, 24 hr urine calcium.  Parathyroid Scan did not identify adenoma.  7/2020 labs showing normal calcium, kidney function and vitamin D levels.  Parathyroid hormones slightly high but improving.    Osteoporosis:  DEXA 2017 c/w osteoporosis.  On fosamax since 11/2018.  Tolerating well.  History of peptic ulcer.  Followed by GI.   DEXA 9/2020: Evidence of spinal deformity as follows:  moderate (grade 2) wedge fracture of L1. This is new compared to 2017. probable significant increase in bone density of the lumbar spine. There has been probably no  significant change in bone density of the hip(s).  Lumbar spine Xray: No obvious loss of vertebral body height.   Denies back pain.  Denies fall.    Energy levels vary but no change since last seen.  Shoulder is hurting.  Sometimes HA.    History of Cancer:No  Thiazide Diuretic:No  Lithium use:No  Family History of pituitary adenoma, pancreas tumors, Zollinger-Diaz syndrome, pheochromocytoma. No  Kidney stones:No  Fractures: No. DEXA c/w osteoporosis- on fosamax since 11/2018.  Abdominal Pain:Yes (Please explain): sometimes. H/o peptic ulcer. Is followed up by GI.  Cramps: No  Constipation: no   Muscle Aches or pains: No  Muscle twitches: No  Nausea and vomiting: no  Confusion Lethargy and fatigue: No confusion or lethargy, fatigue off and on   ON medications like Lithium/ HCTZ: no  Average Daily Calcium intake: dairy- not much  Ca and Vit D supplementation: calcium 500 mg and vit D- but not sure about the dose.  PMH/PSH:  Past Medical History:   Diagnosis Date     CAD (coronary artery disease) 12/13/2011    Followed every 6 months by Dr. Torre, [facility]      Chronic atrial fibrillation (H) 4/11/2013     Hyperlipidemia LDL goal <70 12/13/2011     Hypertension goal BP (blood pressure) < 140/90 12/14/2011     Myocardial infarction (H)      Pacemaker      Past Surgical History:   Procedure Laterality Date     C REPLACEMENT OF MITRAL VALVE  1994    Done at [facility]     COLONOSCOPY N/A 6/13/2018    Procedure: COLONOSCOPY;;  Surgeon: Rosario Alvarez MD;  Location:  GI     ESOPHAGOSCOPY, GASTROSCOPY, DUODENOSCOPY (EGD), COMBINED  06/13/2018    Dr. Alvarez [facility]     ESOPHAGOSCOPY, GASTROSCOPY, DUODENOSCOPY (EGD), COMBINED N/A 6/13/2018    Procedure: COMBINED ESOPHAGOSCOPY, GASTROSCOPY, DUODENOSCOPY (EGD), BIOPSY SINGLE OR MULTIPLE;  COMBINED ESOPHAGOSCOPY, GASTROSCOPY, DUODENOSCOPY (EGD) with biopsies COLONOSCOPY   ;  Surgeon: Rosario Alvarez MD;  Location:  GI     HEART CATH DRUG ELUTING STENT  PLACEMENT  2004/2007    seeing Cardiologist at [facility]     SURGICAL HISTORY OF -   ~ 1995    mechanical aortic valve     SURGICAL HISTORY OF -       pacemaker     SURGICAL HISTORY OF -       mechanical mitral valve     Family Hx:  Family History   Problem Relation Age of Onset     Hypertension Mother      Cancer Mother         lung     Heart Disease Mother      Cerebrovascular Disease Mother      Hypertension Sister      Colon Cancer No family hx of             Social Hx:  Social History     Socioeconomic History     Marital status:      Spouse name: Not on file     Number of children: Not on file     Years of education: Not on file     Highest education level: Not on file   Occupational History     Not on file   Social Needs     Financial resource strain: Not on file     Food insecurity     Worry: Not on file     Inability: Not on file     Transportation needs     Medical: Not on file     Non-medical: Not on file   Tobacco Use     Smoking status: Never Smoker     Smokeless tobacco: Never Used   Substance and Sexual Activity     Alcohol use: No     Drug use: No     Sexual activity: Yes     Partners: Male   Lifestyle     Physical activity     Days per week: Not on file     Minutes per session: Not on file     Stress: Not on file   Relationships     Social connections     Talks on phone: Not on file     Gets together: Not on file     Attends Confucianism service: Not on file     Active member of club or organization: Not on file     Attends meetings of clubs or organizations: Not on file     Relationship status: Not on file     Intimate partner violence     Fear of current or ex partner: Not on file     Emotionally abused: Not on file     Physically abused: Not on file     Forced sexual activity: Not on file   Other Topics Concern     Parent/sibling w/ CABG, MI or angioplasty before 65F 55M? Yes   Social History Narrative     Not on file          MEDICATIONS:  has a current medication list which includes the  following prescription(s): alendronate, amoxicillin, aspirin, atorvastatin, carvedilol, ferrous sulfate, furosemide, losartan, nitroglycerin, pantoprazole, and warfarin anticoagulant.    ROS     ROS: 10 point ROS neg other than the symptoms noted above in the HPI.    Physical Exam   VS: LMP  (LMP Unknown)   Breastfeeding No    LMP  (LMP Unknown)   Breastfeeding No     LABS:  ENDO CALCIUM LABS-UMP Latest Ref Rng & Units 9/27/2018   CALCIUM URINE G/24 H 0.10 - 0.30 g/24 h 0.17   CALCIUM URINE G/G CR g/g Cr 0.30   CALCIUM URINE MG/DL mg/dL 9.3     Calcium:  ENDO CALCIUM LABS-UMP Latest Ref Rng & Units 7/1/2020 1/14/2020   CALCIUM 8.5 - 10.1 mg/dL 9.7 9.6     ENDO CALCIUM LABS-UMP Latest Ref Rng & Units 1/2/2020   CALCIUM 8.5 - 10.1 mg/dL 9.5     ENDO CALCIUM LABS-UMP Latest Ref Rng & Units 9/12/2019   CALCIUM 8.5 - 10.1 mg/dL 9.8   CALCIUM IONIZED 4.4 - 5.2 mg/dL 5.3 (H)     ENDO CALCIUM LABS-UMP Latest Ref Rng & Units 3/13/2019   CALCIUM 8.5 - 10.1 mg/dL 10.5 (H)   CALCIUM IONIZED 4.4 - 5.2 mg/dL 5.3 (H)     ENDO CALCIUM LABS-UMP Latest Ref Rng & Units 10/25/2018 9/27/2018   CALCIUM 8.5 - 10.1 mg/dL 10.9 (H)      ENDO CALCIUM LABS-UMP Latest Ref Rng & Units 9/26/2018   CALCIUM 8.5 - 10.1 mg/dL 10.1     ENDO CALCIUM LABS-UMP Latest Ref Rng & Units 9/10/2018 8/17/2018   CALCIUM 8.5 - 10.1 mg/dL 11.0 (H) 9.8     ENDO CALCIUM LABS-UMP Latest Ref Rng & Units 6/22/2018 6/14/2018   CALCIUM 8.5 - 10.1 mg/dL 10.2 (H)      ENDO CALCIUM LABS-UMP Latest Ref Rng & Units 6/5/2018   CALCIUM 8.5 - 10.1 mg/dL 10.6 (H)     PTH:  ENDO CALCIUM LABS-UMP Latest Ref Rng & Units 7/1/2020   PARATHYROID HORMONE INTACT 18 - 80 pg/mL 116 (H)     ENDO CALCIUM LABS-UMP Latest Ref Rng & Units 1/2/2020   PARATHYROID HORMONE INTACT 18 - 80 pg/mL 105 (H)     Component 9/12/2019   Parathyroid Hormone Intact      18 - 80 pg/mL 110 (H)     ENDO CALCIUM LABS-UMP Latest Ref Rng & Units 3/13/2019   PARATHYROID HORMONE INTACT 18 - 80 pg/mL 142 (H)     ENDO  CALCIUM LABS-UMP Latest Ref Rng & Units 9/26/2018   PARATHYROID HORMONE INTACT 18 - 80 pg/mL 147 (H)     ENDO CALCIUM LABS-UMP Latest Ref Rng & Units 6/22/2018   PARATHYROID HORMONE INTACT 18 - 80 pg/mL 97 (H)     Vitamin D:   Lab Results   Component Value Date    VITDT 32 07/01/2020    VITDT 30 01/02/2020    VITDT 33 09/12/2019    VITDT 28 03/13/2019    VITDT 37 09/26/2018     TFTs:  TSH   Date Value Ref Range Status   04/02/2018 0.87 0.35 - 4.94 uIU/mL Final       DEXA 10/2017:  REFERENCE T-SCORES:       Normal                -1.0 and greater                                 Osteopenia         Between -1.0 and -2.5                                           Osteoporosis     -2.5 and less                                       RISK FACTORS:  Post-menopausal, Height loss of 1.75 inches, Follow-up osteopenia, Low body weight     CURRENT TREATMENT:  Calcium, Vitamin D      FINDINGS:               Lumbar Spine (L1-L2; due to significant degenerative changes at L3 and L4, these were omitted)      T-score:  -3.3               Left Femoral Neck                      T-score:  -2.9               Right Femoral Neck                    T-score:  -3.0                             Lumbar (L1-L4) BMD: 0.876                                                           Total Hip Mean BMD: 0.663                    LATERAL VERTEBRAL ASSESSMENT  Procedure:  Vertebral fracture assessment was performed in the lateral decubitus position using a Lunar Prodigy  densitometer.  Indications for VFA: T-score of -1.0 or worse and age (female>69)  Confounding factors for VFA: Arthritis/degenerative disc disease, rib shadows.  The LVA scan is interpretable from T7 to L4.     VFA Findings: Using the semi-quantitative analysis of [employer] there was evidence of no spinal deformity  VFA Impression: Devyn Link has no vertebral fractures identified on the VFA.          IMPRESSION  Osteoporosis.  Degenerative changes of the spine.  Recommendations include ensuring  adequate daily Calcium and Vitamin D intake  Follow up scan can be considered in three years.     Patient had a study performed previously, however the prior images are not available to compare to the current study.     Current NOF guidelines recommend treatment for patients with the following:  - Prior hip or vertebral fracture  - T-score -2.5 or below  - A 10 year risk of any major osteoporotic fracture >20% or 10 year risk of hip fracture >3%, as calculated using the FRAX calculator (www.shef.ac.uk/FRAX).       Based on these guidelines, treatment (in addition to calcium and vitamin D) is recommended for this patient, after ruling out other causes of osteoporosis/low bone density.  While this is meant as an aid to clinical decision-making, clinical judgment must still be used.     NM PARATHYROID PLANAR W/SPECT AND CT DUAL   11/20/2018      TECHNIQUE:  766uci I123 PO; 24.6mCi Tc99m Sestamibi injected in the  right hand @1442.     HISTORY:  Hypercalcemia. Hyperparathyroidism.     COMPARISON:   Nuclear Study: None.  Other Relevant Studies: None.     FINDINGS:  Planar subtraction images show a normal appearance of the  thyroid gland with no focal lesion to indicate a parathyroid adenoma.  Likewise, the fused SPECT-CT images show no focal lesion or activity  to suggest a parathyroid adenoma. Noncontrast CT scan shows no  evidence of a parathyroid adenoma. 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         IMPRESSION: No nuclear or CT evidence of a parathyroid adenoma.    DEXA 2020:  RISK FACTORS:  Post-menopausal, follow up osteoporosis     CURRENT TREATMENT:  Calcium with Vitamin D, Fosamax     FINDINGS:               Lumbar Spine (L1-L2)      T-score:  -3.0, marked degenerative changes present at L3,4, so only L1,2 are  evaluated.                Left Femoral Neck            T-score:  -3.1               Right Femoral Neck          T-score:  -3.0                  Lumbar (L1-L2) BMD: 0.812  Previous: 0.769                 Total Hip Mean BMD: 0.654  Previous: 0.663     Comparison is made to another DXA performed on a different [employer] machine on 10/9/2017.      LATERAL VERTEBRAL ASSESSMENT  Procedure:  Vertebral fracture assessment was performed in the lateral decubitus position using a Lunar Prodigy  densitometer.  Indications for VFA: T-score of -1.0 or worse and age (female>69)  Confounding factors for VFA: None.  The LVA scan is interpretable from T8 to L5.     VFA Findings: Using the semi-quantitative analysis of [employer] there was evidence of spinal deformity as follows:  Moderate (grade 2) wedge fracture of L1. This is new compared to 2017.  VFA Impression: Devyn Link has one vertebral fracture identified on the LVA.  If alternative etiologies for the presence of vertebral fractures are excluded, the diagnosis is consistent with severe osteoporosis.         IMPRESSION  Severe osteoporosis on the basis of vertebral fracture.      Comparisons from different scanners that have not been cross calibrated, are not necessarily valid. Such a comparison has been performed here; one should interpret with caution.   There has been probable significant increase in bone density of the lumbar spine. There has been probably no significant change in bone density of the hip(s).     Recommendations include ensuring adequate Calcium and Vitamin D.     Follow up can be considered in 2 years.     Lumbar Xray 2020:  IMPRESSION:   1. Mild convex right curvature of the lumbar spine centered at L2.  2. Bones appear osteopenic which limits evaluation for fractures. No  obvious loss of vertebral body height. CT would be more sensitive for  fracture if clinically indicated.  3. Mild degenerative endplate changes and loss of intervertebral disc  space throughout  the lumbar spine, most pronounced at L5-S1.    All pertinent notes, labs, and images personally reviewed by me.     A/P  Ms.Devyn Link is a 74 year old here for the management of:    1. Hyperparathyroidism:    Recent labs stable.  Results reviewed in detail.  High calcium levels noted on and off since 2007.  More recently calcium is in range. Slightly elevated parathyroid hormone  Kidney function is in normal range  Vitamin D in normal range  Not on thaizide diuretic or lithium.  History of osteoporosis based on DEXA scan done 2017. On fosamax since 11/2018.    History of peptic ulcer.  Followed by GI. Gastrin slightly high- in the setting of PPI use.  7/2020 labs showing normal calcium, kidney function and vitamin D levels.  Parathyroid hormones slightly high but improving.  Plan:  Discussed diagnosis, pathophysiology, management and treatment options of condition with pt.  Discussed close monitoring of labs at periodic interval vs neck exploration for parathyroid adenoma ( in the setting of osteoporosis).  At this time she still prefers to continue to monitor.  Plan to repeat labs in next few months.    Discussed s/s of hypercalcemia and recommend adequate hydration.       2.  Osteoporosis:  DEXA 2017 c/w osteoporosis.  On fosamax since 11/2018.  Tolerating well.  History of peptic ulcer.  Followed by GI.   DEXA 9/2020: ( not on same scanner): Evidence of spinal deformity as follows:  moderate (grade 2) wedge fracture of L1. This is new compared to 2017. probable significant increase in bone density of the lumbar spine. There has been probably no significant change in bone density of the hip(s).  Lumbar spine Xray: No obvious loss of vertebral body height. Bones appear osteopenic which limits evaluation for fractures.  Plan:  Discussed diagnosis, pathophysiology, management and treatment options of condition with pt.  DEXA showing possible L2 fracture but Xray did not identify it clearly.  She denies back pain or  recent fall.  Continue Fosamax 70 mg ONCE a week (started 11/21/2018).  Bone density scan in 2 years (2022) at [place].    The pt was advised to    Maintain an adequate calcium and vitamin D intake and to supplement vitamin D if needed to maintain serum levels of 25 hydroxy D (25 OH D) between 30-60 ng/ml.    Limit alcohol intake to no more than 2 servings per day.    Limit caffeine intake.    Maintain an active lifestyle including weight-bearing exercises for at least 30 mins daily.    Take measures to reduce the risk of falling.  Discussed indications, risks and benefits of all medications prescribed, and answered questions to patient's satisfaction.  Instructions on Fosamax use and side effects - particularly esophageal adverse events - are carefully reviewed with her. This drug must be taken upon arising for the day on an empty stomach, with a large 6-8 ounce glass of water; she must remain NPO in the upright position for at least 30 minutes afterwards and until after the first food of the day. If esophageal irritation is noted, she will stop the drug and call my office.  Treatment with bisphosphonate therapy will decrease fracture risk 50-70%.   There is risk of osteonecrosis of the jaw in patients using bisphosphonates is approximately 1/1700-1/100,000, with development most likely related to invasive dental procedures. If an invasive dental procedure was necessary, preferably stop the bisphosphonate approximately 3 months prior to reduce the risk. Let your dentist know that you are on this medication.  The data available at this time suggests that there is probably a small increase risk of atypical (nontraumatic) subtrochanteric fractures of the femur in patients on bisphosphonate therapy compared to those not on it. One large study suggested that for every 100 fractures prevented with bisphosphonate therapy, less than one femur fracture will occur. Other studies suggest one episode per 2,500 patient  years. Patient should call with leg pain.      The patient indicates understanding of these issues and agrees with the plan.      Guidelines for parathyroid surgery in asymptomatic primary hyperparathyroidism:    Serum Calcium >1.0 mg/dl (0.25 mmol/L) above normal   Creatinine Clearance (calculated) Below 60m1/min /1.73 m2)   Bone Mineral Density T score < -2.5 SD at spine, hip (total or femoral neck) or radius (distal 1/3 site) or presence of fragility fracture   Age Age < 50 years        PARATHYROID GLAND IMAGING  Pre- operative imaging is of value prior to surgery in the localization of abnormal parathyroid tissue . The diagnosis of PHPT is based on the biochemical findings and is not affected by the results of the imaging studies. Sestamibi imaging is of value in localizing abnormal parathyroid tissue. The sensitivity and specificity varies among institutions.    Follow-up:  Dec 2020.    Keisha Castro MD refrigerator hold both the microwave and the representative ranges  [facility]  9/23/2020    CC: Prisca Lucero MD        Again, thank you for allowing me to participate in the care of your patient.        Sincerely,        Keisha Castro MD

## 2020-09-23 NOTE — PROGRESS NOTES
"This is a telephone encounter.  Pt declined video.   was present.  9/23/2020    Start time: 10:02    End time: 10:35    More than 10 min spent reviewing chart, labs, results, imaging studies and in patient communication.    In the setting of COVID-19 outbreak patients visits are transitioned to phone visits/ virtual visits as per system and leadership guidelines.  I have personally reviewed data including notes, lab tests. I have reviewed and interpreted images personally.  This encounter is potentially equivalent to established 4 level (07360) clinic visit.    The patient has been notified of following:      \"This telephone visit will be conducted via a call between you and your physician/provider. We have found that certain health care needs can be provided without the need for a physical exam.  This service lets us provide the care you need with a short phone conversation.  If a prescription is necessary we can send it directly to your pharmacy.  If lab work is needed we can place an order for that and you can then stop by our lab to have the test done at a later time.     We will bill your insurance company for this service.  Please check with your medical insurance if this type of visit is covered. You may be responsible for the cost of this type of visit if insurance coverage is denied.  The typical cost is $30 (10min), $59 (11-20min) and $85 (21-30min).  Most often these visits are shorter than 10 minutes. With new updates with corona virus patient might be billed as clinic visit.     If during the course of the call the physician/provider feels a telephone visit is not appropriate, you will not be charged for this service.\"      Past medical history, social history, family history, allergy and medications were reviewed and updated as appropriate.  Reviewed all pertinent labs, notes and imaging studies as appropriate.    Patient verbally consented to phone visit today: yes.    Disclaimer: This note " consists of symbols derived from keyboarding, dictation and/or voice recognition software. As a result, there may be errors in the script that have gone undetected. Please consider this when interpreting information found in this chart.        ROS neg expect noted in notes.  Patient feels well at this time and denies any tachycardia, palpitations, heat intolerance, tremor, insomnia, diarrhea, or unexplained weight loss.  Patient also denies  cold intolerance, constipation, or unexplained weight gain.   Name: Devyn Link  Seen for f/u of hyperPTH. Last visit 1/2020.  HPI:  Devyn Link is a 74 year old female who presents for the management of hyperPTH and osteoporosis.  Hyperparathyroidism:  High calcium has been noted since 2007 on and off.  Other past medical history include CAD, history of aortic valve replacement, Hypertension, mitral valve replacement, pacemaker in place, prediabetes, osteoporosis without current pathological fracture.  Was on Amiodarone in past and now off > 6 months. Was on it for about 2-3 years.  DEXA 2017 consistent with osteoporosis. On fosamax since 11/2018.    Repeat labs showed high  Ca, high PTH, normal creatinine, 24 hr urine calcium.  Parathyroid Scan did not identify adenoma.  7/2020 labs showing normal calcium, kidney function and vitamin D levels.  Parathyroid hormones slightly high but improving.    Osteoporosis:  DEXA 2017 c/w osteoporosis.  On fosamax since 11/2018.  Tolerating well.  History of peptic ulcer.  Followed by GI.   DEXA 9/2020: Evidence of spinal deformity as follows:  moderate (grade 2) wedge fracture of L1. This is new compared to 2017. probable significant increase in bone density of the lumbar spine. There has been probably no significant change in bone density of the hip(s).  Lumbar spine Xray: No obvious loss of vertebral body height.   Denies back pain.  Denies fall.    Energy levels vary but no change since last seen.  Shoulder is hurting.  Sometimes  HA.    History of Cancer:No  Thiazide Diuretic:No  Lithium use:No  Family History of pituitary adenoma, pancreas tumors, Zollinger-Diaz syndrome, pheochromocytoma. No  Kidney stones:No  Fractures: No. DEXA c/w osteoporosis- on fosamax since 11/2018.  Abdominal Pain:Yes (Please explain): sometimes. H/o peptic ulcer. Is followed up by GI.  Cramps: No  Constipation: no   Muscle Aches or pains: No  Muscle twitches: No  Nausea and vomiting: no  Confusion Lethargy and fatigue: No confusion or lethargy, fatigue off and on   ON medications like Lithium/ HCTZ: no  Average Daily Calcium intake: dairy- not much  Ca and Vit D supplementation: calcium 500 mg and vit D- but not sure about the dose.  PMH/PSH:  Past Medical History:   Diagnosis Date     CAD (coronary artery disease) 12/13/2011    Followed every 6 months by Dr. Torre, [facility]      Chronic atrial fibrillation (H) 4/11/2013     Hyperlipidemia LDL goal <70 12/13/2011     Hypertension goal BP (blood pressure) < 140/90 12/14/2011     Myocardial infarction (H)      Pacemaker      Past Surgical History:   Procedure Laterality Date     C REPLACEMENT OF MITRAL VALVE  1994    Done at [facility]     COLONOSCOPY N/A 6/13/2018    Procedure: COLONOSCOPY;;  Surgeon: Rosario Alvarez MD;  Location:  GI     ESOPHAGOSCOPY, GASTROSCOPY, DUODENOSCOPY (EGD), COMBINED  06/13/2018    Dr. Alvarez [facility]     ESOPHAGOSCOPY, GASTROSCOPY, DUODENOSCOPY (EGD), COMBINED N/A 6/13/2018    Procedure: COMBINED ESOPHAGOSCOPY, GASTROSCOPY, DUODENOSCOPY (EGD), BIOPSY SINGLE OR MULTIPLE;  COMBINED ESOPHAGOSCOPY, GASTROSCOPY, DUODENOSCOPY (EGD) with biopsies COLONOSCOPY   ;  Surgeon: Rosario Alvarez MD;  Location:  GI     HEART CATH DRUG ELUTING STENT PLACEMENT  2004/2007    seeing Cardiologist at [facility]     SURGICAL HISTORY OF -   ~ 1995    mechanical aortic valve     SURGICAL HISTORY OF -       pacemaker     SURGICAL HISTORY OF -       mechanical mitral valve     Family  Hx:  Family History   Problem Relation Age of Onset     Hypertension Mother      Cancer Mother         lung     Heart Disease Mother      Cerebrovascular Disease Mother      Hypertension Sister      Colon Cancer No family hx of             Social Hx:  Social History     Socioeconomic History     Marital status:      Spouse name: Not on file     Number of children: Not on file     Years of education: Not on file     Highest education level: Not on file   Occupational History     Not on file   Social Needs     Financial resource strain: Not on file     Food insecurity     Worry: Not on file     Inability: Not on file     Transportation needs     Medical: Not on file     Non-medical: Not on file   Tobacco Use     Smoking status: Never Smoker     Smokeless tobacco: Never Used   Substance and Sexual Activity     Alcohol use: No     Drug use: No     Sexual activity: Yes     Partners: Male   Lifestyle     Physical activity     Days per week: Not on file     Minutes per session: Not on file     Stress: Not on file   Relationships     Social connections     Talks on phone: Not on file     Gets together: Not on file     Attends [employer] service: Not on file     Active member of club or organization: Not on file     Attends meetings of clubs or organizations: Not on file     Relationship status: Not on file     Intimate partner violence     Fear of current or ex partner: Not on file     Emotionally abused: Not on file     Physically abused: Not on file     Forced sexual activity: Not on file   Other Topics Concern     Parent/sibling w/ CABG, MI or angioplasty before 65F 55M? Yes   Social History Narrative     Not on file          MEDICATIONS:  has a current medication list which includes the following prescription(s): alendronate, amoxicillin, aspirin, atorvastatin, carvedilol, ferrous sulfate, furosemide, losartan, nitroglycerin, pantoprazole, and warfarin anticoagulant.    ROS     ROS: 10 point ROS neg other than the  symptoms noted above in the HPI.    Physical Exam   VS: LMP  (LMP Unknown)   Breastfeeding No    LMP  (LMP Unknown)   Breastfeeding No     LABS:  ENDO CALCIUM LABS-UMP Latest Ref Rng & Units 9/27/2018   CALCIUM URINE G/24 H 0.10 - 0.30 g/24 h 0.17   CALCIUM URINE G/G CR g/g Cr 0.30   CALCIUM URINE MG/DL mg/dL 9.3     Calcium:  ENDO CALCIUM LABS-UMP Latest Ref Rng & Units 7/1/2020 1/14/2020   CALCIUM 8.5 - 10.1 mg/dL 9.7 9.6     ENDO CALCIUM LABS-UMP Latest Ref Rng & Units 1/2/2020   CALCIUM 8.5 - 10.1 mg/dL 9.5     ENDO CALCIUM LABS-UMP Latest Ref Rng & Units 9/12/2019   CALCIUM 8.5 - 10.1 mg/dL 9.8   CALCIUM IONIZED 4.4 - 5.2 mg/dL 5.3 (H)     ENDO CALCIUM LABS-UMP Latest Ref Rng & Units 3/13/2019   CALCIUM 8.5 - 10.1 mg/dL 10.5 (H)   CALCIUM IONIZED 4.4 - 5.2 mg/dL 5.3 (H)     ENDO CALCIUM LABS-UMP Latest Ref Rng & Units 10/25/2018 9/27/2018   CALCIUM 8.5 - 10.1 mg/dL 10.9 (H)      ENDO CALCIUM LABS-UMP Latest Ref Rng & Units 9/26/2018   CALCIUM 8.5 - 10.1 mg/dL 10.1     ENDO CALCIUM LABS-UMP Latest Ref Rng & Units 9/10/2018 8/17/2018   CALCIUM 8.5 - 10.1 mg/dL 11.0 (H) 9.8     ENDO CALCIUM LABS-UMP Latest Ref Rng & Units 6/22/2018 6/14/2018   CALCIUM 8.5 - 10.1 mg/dL 10.2 (H)      ENDO CALCIUM LABS-UMP Latest Ref Rng & Units 6/5/2018   CALCIUM 8.5 - 10.1 mg/dL 10.6 (H)     PTH:  ENDO CALCIUM LABS-UMP Latest Ref Rng & Units 7/1/2020   PARATHYROID HORMONE INTACT 18 - 80 pg/mL 116 (H)     ENDO CALCIUM LABS-UMP Latest Ref Rng & Units 1/2/2020   PARATHYROID HORMONE INTACT 18 - 80 pg/mL 105 (H)     Component 9/12/2019   Parathyroid Hormone Intact      18 - 80 pg/mL 110 (H)     ENDO CALCIUM LABS-UMP Latest Ref Rng & Units 3/13/2019   PARATHYROID HORMONE INTACT 18 - 80 pg/mL 142 (H)     ENDO CALCIUM LABS-UMP Latest Ref Rng & Units 9/26/2018   PARATHYROID HORMONE INTACT 18 - 80 pg/mL 147 (H)     ENDO CALCIUM LABS-UMP Latest Ref Rng & Units 6/22/2018   PARATHYROID HORMONE INTACT 18 - 80 pg/mL 97 (H)     Vitamin D:   Lab  Results   Component Value Date    VITDT 32 07/01/2020    VITDT 30 01/02/2020    VITDT 33 09/12/2019    VITDT 28 03/13/2019    VITDT 37 09/26/2018     TFTs:  TSH   Date Value Ref Range Status   04/02/2018 0.87 0.35 - 4.94 uIU/mL Final       DEXA 10/2017:  REFERENCE T-SCORES:       Normal                -1.0 and greater                                 Osteopenia         Between -1.0 and -2.5                                           Osteoporosis     -2.5 and less                                       RISK FACTORS:  Post-menopausal, Height loss of 1.75 inches, Follow-up osteopenia, Low body weight     CURRENT TREATMENT:  Calcium, Vitamin D      FINDINGS:               Lumbar Spine (L1-L2; due to significant degenerative changes at L3 and L4, these were omitted)      T-score:  -3.3               Left Femoral Neck                      T-score:  -2.9               Right Femoral Neck                    T-score:  -3.0                             Lumbar (L1-L4) BMD: 0.876                                                           Total Hip Mean BMD: 0.663                    LATERAL VERTEBRAL ASSESSMENT  Procedure:  Vertebral fracture assessment was performed in the lateral decubitus position using a Lunar Prodigy  densitometer.  Indications for VFA: T-score of -1.0 or worse and age (female>69)  Confounding factors for VFA: Arthritis/degenerative disc disease, rib shadows.  The LVA scan is interpretable from T7 to L4.     VFA Findings: Using the semi-quantitative analysis of [employer] there was evidence of no spinal deformity  VFA Impression: Devyn Link has no vertebral fractures identified on the VFA.          IMPRESSION  Osteoporosis.  Degenerative changes of the spine.  Recommendations include ensuring adequate daily Calcium and Vitamin D intake  Follow up scan can be considered in three years.     Patient had a study performed previously, however the prior images are not available to compare to the current study.     Current  NOF guidelines recommend treatment for patients with the following:  - Prior hip or vertebral fracture  - T-score -2.5 or below  - A 10 year risk of any major osteoporotic fracture >20% or 10 year risk of hip fracture >3%, as calculated using the FRAX calculator (www.shef.ac.uk/FRAX).       Based on these guidelines, treatment (in addition to calcium and vitamin D) is recommended for this patient, after ruling out other causes of osteoporosis/low bone density.  While this is meant as an aid to clinical decision-making, clinical judgment must still be used.     NM PARATHYROID PLANAR W/SPECT AND CT DUAL   11/20/2018      TECHNIQUE:  766uci I123 PO; 24.6mCi Tc99m Sestamibi injected in the  right hand @1442.     HISTORY:  Hypercalcemia. Hyperparathyroidism.     COMPARISON:   Nuclear Study: None.  Other Relevant Studies: None.     FINDINGS:  Planar subtraction images show a normal appearance of the  thyroid gland with no focal lesion to indicate a parathyroid adenoma.  Likewise, the fused SPECT-CT images show no focal lesion or activity  to suggest a parathyroid adenoma. Noncontrast CT scan shows no  evidence of a parathyroid adenoma. 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         IMPRESSION: No nuclear or CT evidence of a parathyroid adenoma.    DEXA 2020:  RISK FACTORS:  Post-menopausal, follow up osteoporosis     CURRENT TREATMENT:  Calcium with Vitamin D, Fosamax     FINDINGS:               Lumbar Spine (L1-L2)      T-score:  -3.0, marked degenerative changes present at L3,4, so only L1,2 are evaluated.                Left Femoral Neck            T-score:  -3.1               Right Femoral Neck          T-score:  -3.0                  Lumbar (L1-L2) BMD: 0.812  Previous: 0.769                 Total Hip Mean BMD:  0.654  Previous: 0.663     Comparison is made to another DXA performed on a different Lunar machine on 10/9/2017.      LATERAL VERTEBRAL ASSESSMENT  Procedure:  Vertebral fracture assessment was performed in the lateral decubitus position using a Lunar Prodigy  densitometer.  Indications for VFA: T-score of -1.0 or worse and age (female>69)  Confounding factors for VFA: None.  The LVA scan is interpretable from T8 to L5.     VFA Findings: Using the semi-quantitative analysis of [employer] there was evidence of spinal deformity as follows:  Moderate (grade 2) wedge fracture of L1. This is new compared to 2017.  VFA Impression: Devyn Link has one vertebral fracture identified on the LVA.  If alternative etiologies for the presence of vertebral fractures are excluded, the diagnosis is consistent with severe osteoporosis.         IMPRESSION  Severe osteoporosis on the basis of vertebral fracture.      Comparisons from different scanners that have not been cross calibrated, are not necessarily valid. Such a comparison has been performed here; one should interpret with caution.   There has been probable significant increase in bone density of the lumbar spine. There has been probably no significant change in bone density of the hip(s).     Recommendations include ensuring adequate Calcium and Vitamin D.     Follow up can be considered in 2 years.     Lumbar Xray 2020:  IMPRESSION:   1. Mild convex right curvature of the lumbar spine centered at L2.  2. Bones appear osteopenic which limits evaluation for fractures. No  obvious loss of vertebral body height. CT would be more sensitive for  fracture if clinically indicated.  3. Mild degenerative endplate changes and loss of intervertebral disc  space throughout the lumbar spine, most pronounced at L5-S1.    All pertinent notes, labs, and images personally reviewed by me.     A/P  Ms.Xiang Link is a 74 year old here for the management of:    1. Hyperparathyroidism:    Recent labs  stable.  Results reviewed in detail.  High calcium levels noted on and off since 2007.  More recently calcium is in range. Slightly elevated parathyroid hormone  Kidney function is in normal range  Vitamin D in normal range  Not on thaizide diuretic or lithium.  History of osteoporosis based on DEXA scan done 2017. On fosamax since 11/2018.    History of peptic ulcer.  Followed by GI. Gastrin slightly high- in the setting of PPI use.  7/2020 labs showing normal calcium, kidney function and vitamin D levels.  Parathyroid hormones slightly high but improving.  Plan:  Discussed diagnosis, pathophysiology, management and treatment options of condition with pt.  Discussed close monitoring of labs at periodic interval vs neck exploration for parathyroid adenoma ( in the setting of osteoporosis).  At this time she still prefers to continue to monitor.  Plan to repeat labs in next few months.    Discussed s/s of hypercalcemia and recommend adequate hydration.       2.  Osteoporosis:  DEXA 2017 c/w osteoporosis.  On fosamax since 11/2018.  Tolerating well.  History of peptic ulcer.  Followed by GI.   DEXA 9/2020: ( not on same scanner): Evidence of spinal deformity as follows:  moderate (grade 2) wedge fracture of L1. This is new compared to 2017. probable significant increase in bone density of the lumbar spine. There has been probably no significant change in bone density of the hip(s).  Lumbar spine Xray: No obvious loss of vertebral body height. Bones appear osteopenic which limits evaluation for fractures.  Plan:  Discussed diagnosis, pathophysiology, management and treatment options of condition with pt.  DEXA showing possible L2 fracture but Xray did not identify it clearly.  She denies back pain or recent fall.  Continue Fosamax 70 mg ONCE a week (started 11/21/2018).  Bone density scan in 2 years (2022) at [place].    The pt was advised to    Maintain an adequate calcium and vitamin D intake and to supplement  vitamin D if needed to maintain serum levels of 25 hydroxy D (25 OH D) between 30-60 ng/ml.    Limit alcohol intake to no more than 2 servings per day.    Limit caffeine intake.    Maintain an active lifestyle including weight-bearing exercises for at least 30 mins daily.    Take measures to reduce the risk of falling.  Discussed indications, risks and benefits of all medications prescribed, and answered questions to patient's satisfaction.  Instructions on Fosamax use and side effects - particularly esophageal adverse events - are carefully reviewed with her. This drug must be taken upon arising for the day on an empty stomach, with a large 6-8 ounce glass of water; she must remain NPO in the upright position for at least 30 minutes afterwards and until after the first food of the day. If esophageal irritation is noted, she will stop the drug and call my office.  Treatment with bisphosphonate therapy will decrease fracture risk 50-70%.   There is risk of osteonecrosis of the jaw in patients using bisphosphonates is approximately 1/1700-1/100,000, with development most likely related to invasive dental procedures. If an invasive dental procedure was necessary, preferably stop the bisphosphonate approximately 3 months prior to reduce the risk. Let your dentist know that you are on this medication.  The data available at this time suggests that there is probably a small increase risk of atypical (nontraumatic) subtrochanteric fractures of the femur in patients on bisphosphonate therapy compared to those not on it. One large study suggested that for every 100 fractures prevented with bisphosphonate therapy, less than one femur fracture will occur. Other studies suggest one episode per 2,500 patient years. Patient should call with leg pain.      The patient indicates understanding of these issues and agrees with the plan.      Guidelines for parathyroid surgery in asymptomatic primary hyperparathyroidism:    Serum  Calcium >1.0 mg/dl (0.25 mmol/L) above normal   Creatinine Clearance (calculated) Below 60m1/min /1.73 m2)   Bone Mineral Density T score < -2.5 SD at spine, hip (total or femoral neck) or radius (distal 1/3 site) or presence of fragility fracture   Age Age < 50 years        PARATHYROID GLAND IMAGING  Pre- operative imaging is of value prior to surgery in the localization of abnormal parathyroid tissue . The diagnosis of PHPT is based on the biochemical findings and is not affected by the results of the imaging studies. Sestamibi imaging is of value in localizing abnormal parathyroid tissue. The sensitivity and specificity varies among institutions.    Follow-up:  Dec 2020.    Keisha Castro MD refrigerator hold both the microwave and the representative ranges  [facility]  9/23/2020    CC: Prisca Lucero MD

## 2020-11-18 DIAGNOSIS — Z87.898 HISTORY OF ULCER DISEASE: ICD-10-CM

## 2020-11-19 RX ORDER — PANTOPRAZOLE SODIUM 40 MG/1
TABLET, DELAYED RELEASE ORAL
Qty: 90 TABLET | Refills: 0 | Status: SHIPPED | OUTPATIENT
Start: 2020-11-19 | End: 2021-02-01

## 2020-11-19 NOTE — TELEPHONE ENCOUNTER
pantoprazole (PROTONIX) 40 MG EC tablet  Last Written Prescription Date:  9/10/20  Last Fill Quantity: 90,   # refills: 0  Last Office Visit: 1/14/20  Future Office visit:    Next 5 appointments (look out 90 days)    Dec 29, 2020 11:20 AM  SHORT with Prisca Lucero MD  [address] [address]  [phone]           Routing refill request to provider for review/approval because:  No diagnosis of osteoporosis on record    Lyly Ovalle RN on 11/19/2020 at 12:57 PM

## 2020-12-14 DIAGNOSIS — E21.3 HYPERPARATHYROIDISM (H): ICD-10-CM

## 2020-12-14 LAB — PTH-INTACT SERPL-MCNC: 117 PG/ML (ref 18–80)

## 2020-12-14 PROCEDURE — 83735 ASSAY OF MAGNESIUM: CPT | Performed by: INTERNAL MEDICINE

## 2020-12-14 PROCEDURE — 84100 ASSAY OF PHOSPHORUS: CPT | Performed by: INTERNAL MEDICINE

## 2020-12-14 PROCEDURE — 82565 ASSAY OF CREATININE: CPT | Performed by: INTERNAL MEDICINE

## 2020-12-14 PROCEDURE — 36415 COLL VENOUS BLD VENIPUNCTURE: CPT | Performed by: INTERNAL MEDICINE

## 2020-12-14 PROCEDURE — 82306 VITAMIN D 25 HYDROXY: CPT | Performed by: INTERNAL MEDICINE

## 2020-12-14 PROCEDURE — 82310 ASSAY OF CALCIUM: CPT | Performed by: INTERNAL MEDICINE

## 2020-12-14 PROCEDURE — 83970 ASSAY OF PARATHORMONE: CPT | Performed by: INTERNAL MEDICINE

## 2020-12-15 LAB
CALCIUM SERPL-MCNC: 10.1 MG/DL (ref 8.5–10.1)
CREAT SERPL-MCNC: 0.66 MG/DL (ref 0.52–1.04)
GFR SERPL CREATININE-BSD FRML MDRD: 85 ML/MIN/{1.73_M2}
MAGNESIUM SERPL-MCNC: 2.4 MG/DL (ref 1.6–2.3)
PHOSPHATE SERPL-MCNC: 2.5 MG/DL (ref 2.5–4.5)

## 2020-12-16 LAB — DEPRECATED CALCIDIOL+CALCIFEROL SERPL-MC: 35 UG/L (ref 20–75)

## 2020-12-17 ENCOUNTER — VIRTUAL VISIT (OUTPATIENT)
Dept: ENDOCRINOLOGY | Facility: CLINIC | Age: 75
End: 2020-12-17
Payer: COMMERCIAL

## 2020-12-17 DIAGNOSIS — E83.52 HYPERCALCEMIA: ICD-10-CM

## 2020-12-17 DIAGNOSIS — M81.8 OTHER OSTEOPOROSIS WITHOUT CURRENT PATHOLOGICAL FRACTURE: Primary | ICD-10-CM

## 2020-12-17 DIAGNOSIS — E21.3 HYPERPARATHYROIDISM (H): ICD-10-CM

## 2020-12-17 PROCEDURE — 99214 OFFICE O/P EST MOD 30 MIN: CPT | Mod: 95 | Performed by: INTERNAL MEDICINE

## 2020-12-17 RX ORDER — ALENDRONATE SODIUM 70 MG/1
70 TABLET ORAL
Qty: 12 TABLET | Refills: 3 | Status: SHIPPED | OUTPATIENT
Start: 2020-12-17

## 2020-12-17 NOTE — LETTER
"    12/17/2020         RE: Devyn Link  [id number] [facility] [id number]        Dear Colleague,    Thank you for referring your patient, Devyn Link, to the [facility]. Please see a copy of my visit note below.    This is a telephone encounter.  Pt declined video.   was present.  12/17/2020    Start time: 1:07    End time: 1:33    More than 10 min spent reviewing chart, labs, results, imaging studies and in patient communication.    In the setting of COVID-19 outbreak patients visits are transitioned to phone visits/ virtual visits as per system and leadership guidelines.  I have personally reviewed data including notes, lab tests. I have reviewed and interpreted images personally.  This encounter is potentially equivalent to established 4 level (36930) clinic visit.    The patient has been notified of following:      \"This telephone visit will be conducted via a call between you and your physician/provider. We have found that certain health care needs can be provided without the need for a physical exam.  This service lets us provide the care you need with a short phone conversation.  If a prescription is necessary we can send it directly to your pharmacy.  If lab work is needed we can place an order for that and you can then stop by our lab to have the test done at a later time.     We will bill your insurance company for this service.  Please check with your medical insurance if this type of visit is covered. You may be responsible for the cost of this type of visit if insurance coverage is denied.  The typical cost is $30 (10min), $59 (11-20min) and $85 (21-30min).  Most often these visits are shorter than 10 minutes. With new updates with corona virus patient might be billed as clinic visit.     If during the course of the call the physician/provider feels a telephone visit is not appropriate, you will not be charged for this service.\"      Past medical " history, social history, family history, allergy and medications were reviewed and updated as appropriate.  Reviewed all pertinent labs, notes and imaging studies as appropriate.    Patient verbally consented to phone visit today: yes.    Disclaimer: This note consists of symbols derived from keyboarding, dictation and/or voice recognition software. As a result, there may be errors in the script that have gone undetected. Please consider this when interpreting information found in this chart.        ROS neg expect noted in notes.  Patient feels well at this time and denies any tachycardia, palpitations, heat intolerance, tremor, insomnia, diarrhea, or unexplained weight loss.  Patient also denies  cold intolerance, constipation, or unexplained weight gain.   Name: Devyn Link  Seen for f/u of hyperPTH.   HPI:  Devyn Link is a 75 year old female who presents for the management of hyperPTH and osteoporosis.  Hyperparathyroidism:  High calcium has been noted since 2007 on and off.  Other past medical history include CAD, history of aortic valve replacement, Hypertension, mitral valve replacement, pacemaker in place, prediabetes, osteoporosis without current pathological fracture.  Was on Amiodarone in past and now off > 6 months. Was on it for about 2-3 years.  DEXA 2017 consistent with osteoporosis. On fosamax since 11/2018.    Repeat labs showed high  Ca, high PTH, normal creatinine, 24 hr urine calcium.  Parathyroid Scan did not identify adenoma.  12/2020 labs showing normal calcium ( high normal), kidney function and vitamin D levels.  Parathyroid hormones slightly high but stable.    Osteoporosis:  DEXA 2017 c/w osteoporosis.  On fosamax since 11/2018.  Tolerating well.  History of peptic ulcer.  Followed by GI.   DEXA 9/2020: Evidence of spinal deformity as follows:  moderate (grade 2) wedge fracture of L1. This is new compared to 2017. probable significant increase in bone density of the lumbar spine. There has been  probably no significant change in bone density of the hip(s).  Lumbar spine Xray: No obvious loss of vertebral body height.   Denies back pain.  Denies fall.    Shoulder is hurting. This is chronic problem. Has upcoming appointment with PCP.  Sometimes HA.    History of Cancer:No  Thiazide Diuretic:No  Lithium use:No  Family History of pituitary adenoma, pancreas tumors, Zollinger-Diaz syndrome, pheochromocytoma. No  Kidney stones:No  Fractures: No. DEXA c/w osteoporosis- on fosamax since 11/2018.  Abdominal Pain:Yes (Please explain): sometimes. H/o peptic ulcer. Is followed up by GI.  Cramps: No  Constipation: no   Muscle Aches or pains: No  Muscle twitches: No  Nausea and vomiting: no  Confusion Lethargy and fatigue: No confusion or lethargy, fatigue off and on   ON medications like Lithium/ HCTZ: no  Average Daily Calcium intake: dairy- not much  Ca and Vit D supplementation: calcium 500 mg and vit D- but not sure about the dose.  PMH/PSH:  Past Medical History:   Diagnosis Date     CAD (coronary artery disease) 12/13/2011    Followed every 6 months by Dr. Torre, [facility]      Chronic atrial fibrillation (H) 4/11/2013     Hyperlipidemia LDL goal <70 12/13/2011     Hypertension goal BP (blood pressure) < 140/90 12/14/2011     Myocardial infarction (H)      Pacemaker      Past Surgical History:   Procedure Laterality Date     C REPLACEMENT OF MITRAL VALVE  1994    Done at [facility]     COLONOSCOPY N/A 6/13/2018    Procedure: COLONOSCOPY;;  Surgeon: Rosario Alvarez MD;  Location:  GI     ESOPHAGOSCOPY, GASTROSCOPY, DUODENOSCOPY (EGD), COMBINED  06/13/2018    Dr. Alvarez [facility]     ESOPHAGOSCOPY, GASTROSCOPY, DUODENOSCOPY (EGD), COMBINED N/A 6/13/2018    Procedure: COMBINED ESOPHAGOSCOPY, GASTROSCOPY, DUODENOSCOPY (EGD), BIOPSY SINGLE OR MULTIPLE;  COMBINED ESOPHAGOSCOPY, GASTROSCOPY, DUODENOSCOPY (EGD) with biopsies COLONOSCOPY   ;  Surgeon: Rosario Alvarez MD;  Location: [facility]     HEART CATH  DRUG ELUTING STENT PLACEMENT  2004/2007    seeing Cardiologist at [facility]     SURGICAL HISTORY OF -   ~ 1995    mechanical aortic valve     SURGICAL HISTORY OF -       pacemaker     SURGICAL HISTORY OF -       mechanical mitral valve     Family Hx:  Family History   Problem Relation Age of Onset     Hypertension Mother      Cancer Mother         lung     Heart Disease Mother      Cerebrovascular Disease Mother      Hypertension Sister      Colon Cancer No family hx of             Social Hx:  Social History     Socioeconomic History     Marital status:      Spouse name: Not on file     Number of children: Not on file     Years of education: Not on file     Highest education level: Not on file   Occupational History     Not on file   Social Needs     Financial resource strain: Not on file     Food insecurity     Worry: Not on file     Inability: Not on file     Transportation needs     Medical: Not on file     Non-medical: Not on file   Tobacco Use     Smoking status: Never Smoker     Smokeless tobacco: Never Used   Substance and Sexual Activity     Alcohol use: No     Drug use: No     Sexual activity: Yes     Partners: Male   Lifestyle     Physical activity     Days per week: Not on file     Minutes per session: Not on file     Stress: Not on file   Relationships     Social connections     Talks on phone: Not on file     Gets together: Not on file     Attends Judaism service: Not on file     Active member of club or organization: Not on file     Attends meetings of clubs or organizations: Not on file     Relationship status: Not on file     Intimate partner violence     Fear of current or ex partner: Not on file     Emotionally abused: Not on file     Physically abused: Not on file     Forced sexual activity: Not on file   Other Topics Concern     Parent/sibling w/ CABG, MI or angioplasty before 65F 55M? Yes   Social History Narrative     Not on file          MEDICATIONS:  has a current medication list  which includes the following prescription(s): alendronate, amoxicillin, aspirin, atorvastatin, carvedilol, ferrous sulfate, furosemide, losartan, nitroglycerin, pantoprazole, and warfarin anticoagulant.    ROS     ROS: 10 point ROS neg other than the symptoms noted above in the HPI.    Physical Exam   VS: LMP  (LMP Unknown)   Breastfeeding No    LMP  (LMP Unknown)   Breastfeeding No     LABS:  ENDO CALCIUM LABS-UMP Latest Ref Rng & Units 9/27/2018   CALCIUM URINE G/24 H 0.10 - 0.30 g/24 h 0.17   CALCIUM URINE G/G CR g/g Cr 0.30   CALCIUM URINE MG/DL mg/dL 9.3     Calcium:  ENDO CALCIUM LABS-UMP Latest Ref Rng & Units 7/1/2020 1/14/2020   CALCIUM 8.5 - 10.1 mg/dL 9.7 9.6     ENDO CALCIUM LABS-UMP Latest Ref Rng & Units 1/2/2020   CALCIUM 8.5 - 10.1 mg/dL 9.5     ENDO CALCIUM LABS-UMP Latest Ref Rng & Units 9/12/2019   CALCIUM 8.5 - 10.1 mg/dL 9.8   CALCIUM IONIZED 4.4 - 5.2 mg/dL 5.3 (H)     ENDO CALCIUM LABS-UMP Latest Ref Rng & Units 3/13/2019   CALCIUM 8.5 - 10.1 mg/dL 10.5 (H)   CALCIUM IONIZED 4.4 - 5.2 mg/dL 5.3 (H)     ENDO CALCIUM LABS-UMP Latest Ref Rng & Units 10/25/2018 9/27/2018   CALCIUM 8.5 - 10.1 mg/dL 10.9 (H)      ENDO CALCIUM LABS-UMP Latest Ref Rng & Units 9/26/2018   CALCIUM 8.5 - 10.1 mg/dL 10.1     ENDO CALCIUM LABS-UMP Latest Ref Rng & Units 9/10/2018 8/17/2018   CALCIUM 8.5 - 10.1 mg/dL 11.0 (H) 9.8     ENDO CALCIUM LABS-UMP Latest Ref Rng & Units 6/22/2018 6/14/2018   CALCIUM 8.5 - 10.1 mg/dL 10.2 (H)      ENDO CALCIUM LABS-UMP Latest Ref Rng & Units 6/5/2018   CALCIUM 8.5 - 10.1 mg/dL 10.6 (H)     PTH:  ENDO CALCIUM LABS-UMP Latest Ref Rng & Units 7/1/2020   PARATHYROID HORMONE INTACT 18 - 80 pg/mL 116 (H)     ENDO CALCIUM LABS-UMP Latest Ref Rng & Units 1/2/2020   PARATHYROID HORMONE INTACT 18 - 80 pg/mL 105 (H)     Component 9/12/2019   Parathyroid Hormone Intact      18 - 80 pg/mL 110 (H)     ENDO CALCIUM LABS-UMP Latest Ref Rng & Units 3/13/2019   PARATHYROID HORMONE INTACT 18 - 80  pg/mL 142 (H)     ENDO CALCIUM LABS-UMP Latest Ref Rng & Units 9/26/2018   PARATHYROID HORMONE INTACT 18 - 80 pg/mL 147 (H)     ENDO CALCIUM LABS-UMP Latest Ref Rng & Units 6/22/2018   PARATHYROID HORMONE INTACT 18 - 80 pg/mL 97 (H)     Vitamin D:   Lab Results   Component Value Date    VITDT 35 12/14/2020    VITDT 32 07/01/2020    VITDT 30 01/02/2020    VITDT 33 09/12/2019    VITDT 28 03/13/2019     TFTs:  TSH   Date Value Ref Range Status   04/02/2018 0.87 0.35 - 4.94 uIU/mL Final       DEXA 10/2017:  REFERENCE T-SCORES:       Normal                -1.0 and greater                                 Osteopenia         Between -1.0 and -2.5                                           Osteoporosis     -2.5 and less                                       RISK FACTORS:  Post-menopausal, Height loss of 1.75 inches, Follow-up osteopenia, Low body weight     CURRENT TREATMENT:  Calcium, Vitamin D      FINDINGS:               Lumbar Spine (L1-L2; due to significant degenerative changes at L3 and L4, these were omitted)      T-score:  -3.3               Left Femoral Neck                      T-score:  -2.9               Right Femoral Neck                    T-score:  -3.0                             Lumbar (L1-L4) BMD: 0.876                                                           Total Hip Mean BMD: 0.663                    LATERAL VERTEBRAL ASSESSMENT  Procedure:  Vertebral fracture assessment was performed in the lateral decubitus position using a Lunar Prodigy  densitometer.  Indications for VFA: T-score of -1.0 or worse and age (female>69)  Confounding factors for VFA: Arthritis/degenerative disc disease, rib shadows.  The LVA scan is interpretable from T7 to L4.     VFA Findings: Using the semi-quantitative analysis of [employer] there was evidence of no spinal deformity  VFA Impression: Devyn Link has no vertebral fractures identified on the VFA.          IMPRESSION  Osteoporosis.  Degenerative changes of the  spine.  Recommendations include ensuring adequate daily Calcium and Vitamin D intake  Follow up scan can be considered in three years.     Patient had a study performed previously, however the prior images are not available to compare to the current study.     Current NOF guidelines recommend treatment for patients with the following:  - Prior hip or vertebral fracture  - T-score -2.5 or below  - A 10 year risk of any major osteoporotic fracture >20% or 10 year risk of hip fracture >3%, as calculated using the FRAX calculator (www.shef.ac.uk/FRAX).       Based on these guidelines, treatment (in addition to calcium and vitamin D) is recommended for this patient, after ruling out other causes of osteoporosis/low bone density.  While this is meant as an aid to clinical decision-making, clinical judgment must still be used.     NM PARATHYROID PLANAR W/SPECT AND CT DUAL   11/20/2018      TECHNIQUE:  766uci I123 PO; 24.6mCi Tc99m Sestamibi injected in the  right hand @1442.     HISTORY:  Hypercalcemia. Hyperparathyroidism.     COMPARISON:   Nuclear Study: None.  Other Relevant Studies: None.     FINDINGS:  Planar subtraction images show a normal appearance of the  thyroid gland with no focal lesion to indicate a parathyroid adenoma.  Likewise, the fused SPECT-CT images show no focal lesion or activity  to suggest a parathyroid adenoma. Noncontrast CT scan shows no  evidence of a parathyroid adenoma. 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         IMPRESSION: No nuclear or CT evidence of a parathyroid adenoma.    DEXA 2020:  RISK FACTORS:  Post-menopausal, follow up osteoporosis     CURRENT TREATMENT:  Calcium with Vitamin D, Fosamax     FINDINGS:               Lumbar Spine (L1-L2)      T-score:  -3.0, marked degenerative changes  present at L3,4, so only L1,2 are evaluated.                Left Femoral Neck            T-score:  -3.1               Right Femoral Neck          T-score:  -3.0                  Lumbar (L1-L2) BMD: 0.812  Previous: 0.769                 Total Hip Mean BMD: 0.654  Previous: 0.663     Comparison is made to another DXA performed on a different [employer] machine on 10/9/2017.      LATERAL VERTEBRAL ASSESSMENT  Procedure:  Vertebral fracture assessment was performed in the lateral decubitus position using a Lunar Prodigy  densitometer.  Indications for VFA: T-score of -1.0 or worse and age (female>69)  Confounding factors for VFA: None.  The LVA scan is interpretable from T8 to L5.     VFA Findings: Using the semi-quantitative analysis of [employer] there was evidence of spinal deformity as follows:  Moderate (grade 2) wedge fracture of L1. This is new compared to 2017.  VFA Impression: Devyn Link has one vertebral fracture identified on the LVA.  If alternative etiologies for the presence of vertebral fractures are excluded, the diagnosis is consistent with severe osteoporosis.         IMPRESSION  Severe osteoporosis on the basis of vertebral fracture.      Comparisons from different scanners that have not been cross calibrated, are not necessarily valid. Such a comparison has been performed here; one should interpret with caution.   There has been probable significant increase in bone density of the lumbar spine. There has been probably no significant change in bone density of the hip(s).     Recommendations include ensuring adequate Calcium and Vitamin D.     Follow up can be considered in 2 years.     Lumbar Xray 2020:  IMPRESSION:   1. Mild convex right curvature of the lumbar spine centered at L2.  2. Bones appear osteopenic which limits evaluation for fractures. No  obvious loss of vertebral body height. CT would be more sensitive for  fracture if clinically indicated.  3. Mild degenerative endplate changes and loss of  intervertebral disc  space throughout the lumbar spine, most pronounced at L5-S1.    All pertinent notes, labs, and images personally reviewed by me.     A/P  MsRaoul Link is a 75 year old here for the management of:    1. Hyperparathyroidism:    Recent labs stable.  Results reviewed in detail.  High calcium levels noted on and off since 2007.  More recently calcium is in range. Slightly elevated parathyroid hormone  Kidney function is in normal range.  Vitamin D in normal range  Not on thaizide diuretic or lithium.  No nuclear or CT evidence of a parathyroid adenoma.  History of osteoporosis based on DEXA scan done 2017. On fosamax since 11/2018.    History of peptic ulcer.  Followed by GI. Gastrin slightly high- in the setting of PPI use.  12/2020 labs showing upper normal calcium levels, normal kidney function and vitamin D levels.  Parathyroid hormones slightly high but appear stable.  Plan:  Discussed diagnosis, pathophysiology, management and treatment options of condition with pt.  Discussed neck exploration for parathyroid adenoma ( in the setting of osteoporosis).  At this time she still prefers to continue to monitor and wants to avoid surgery. Again she is not ready for surgery and prefers close monitoring.  Discussed risk for decreased BMD with ongoing parathyroid problem.  Plan to repeat labs in next few months.    Discussed s/s of hypercalcemia and recommend adequate hydration.       2.  Osteoporosis:  DEXA 2017 c/w osteoporosis.  On fosamax since 11/2018.  Tolerating well.  History of peptic ulcer.  Followed by GI.   DEXA 9/2020: ( not on same scanner): Evidence of spinal deformity as follows:  moderate (grade 2) wedge fracture of L1. This is new compared to 2017. probable significant increase in bone density of the lumbar spine. There has been probably no significant change in bone density of the hip(s).  Lumbar spine Xray: No obvious loss of vertebral body height. Bones appear osteopenic which limits  evaluation for fractures.  Plan:  Discussed diagnosis, pathophysiology, management and treatment options of condition with pt.  DEXA showing possible L2 fracture but Xray did not identify it clearly.  She denies back pain or recent fall.  Continue Fosamax 70 mg ONCE a week (started 11/21/2018).  Bone density scan in 2 years (9/2022) at [facility].    The pt was advised to    Maintain an adequate calcium and vitamin D intake and to supplement vitamin D if needed to maintain serum levels of 25 hydroxy D (25 OH D) between 30-60 ng/ml.    Limit alcohol intake to no more than 2 servings per day.    Limit caffeine intake.    Maintain an active lifestyle including weight-bearing exercises for at least 30 mins daily.    Take measures to reduce the risk of falling.      Discussed indications, risks and benefits of all medications prescribed, and answered questions to patient's satisfaction.  Instructions on Fosamax use and side effects - particularly esophageal adverse events - are carefully reviewed with her. This drug must be taken upon arising for the day on an empty stomach, with a large 6-8 ounce glass of water; she must remain NPO in the upright position for at least 30 minutes afterwards and until after the first food of the day. If esophageal irritation is noted, she will stop the drug and call my office.  Treatment with bisphosphonate therapy will decrease fracture risk 50-70%.   There is risk of osteonecrosis of the jaw in patients using bisphosphonates is approximately 1/1700-1/100,000, with development most likely related to invasive dental procedures. If an invasive dental procedure was necessary, preferably stop the bisphosphonate approximately 3 months prior to reduce the risk. Let your dentist know that you are on this medication.  The data available at this time suggests that there is probably a small increase risk of atypical (nontraumatic) subtrochanteric fractures of the femur in patients on  bisphosphonate therapy compared to those not on it. One large study suggested that for every 100 fractures prevented with bisphosphonate therapy, less than one femur fracture will occur. Other studies suggest one episode per 2,500 patient years. Patient should call with leg pain.      The patient indicates understanding of these issues and agrees with the plan.      Guidelines for parathyroid surgery in asymptomatic primary hyperparathyroidism:    Serum Calcium >1.0 mg/dl (0.25 mmol/L) above normal   Creatinine Clearance (calculated) Below 60m1/min /1.73 m2)   Bone Mineral Density T score < -2.5 SD at spine, hip (total or femoral neck) or radius (distal 1/3 site) or presence of fragility fracture   Age Age < 50 years        PARATHYROID GLAND IMAGING  Pre- operative imaging is of value prior to surgery in the localization of abnormal parathyroid tissue . The diagnosis of PHPT is based on the biochemical findings and is not affected by the results of the imaging studies. Sestamibi imaging is of value in localizing abnormal parathyroid tissue. The sensitivity and specificity varies among institutions.    Follow-up:  6 months.    Keisha Castro MD   Endocrinology   [facility]    CC: Prisca Lucero              Again, thank you for allowing me to participate in the care of your patient.        Sincerely,        Keisha Castro MD

## 2020-12-17 NOTE — PATIENT INSTRUCTIONS
[facility] & [place] locations   Dr Castro, Endocrinology Department      [facility]   [address]  [place] [address]  Appointment [phone]  Fax: [phone]  [place]: Monday and Tuesday         [facility]   [address]  [place] [address]  Appointment [phone]  Fax: [phone]  [place]: Wednesday and Thursday            Please check the cost coverage and copay with insurance before recommended tests, services and medications (especially if new medications are prescribed).     If ordered, please get blood work done 1 week prior to your next appointment so they will be available to Dr. Castro at your visit.    Continue FOsmax for osteoporosis.  Labs in 6 months.  Please make a lab appointment for blood work and follow up clinic appointment in 1 week after that to discuss results.    You should get 1000- 1200 mg/day calcium in divided doses of no more than 500 mg/dose.  This INCLUDES what is in your food as well as what is in any supplements you may be taking.    Vit D about 800-1000 IU/day ( unless you have vit D deficiency- in that case higher dose)  Dietary sources of calcium:: These also contain vitamin D  Milk                            8 oz            300 mg calcium  Yogurt                          1 cup           400 mg calcium   Hard cheese                     1.5 oz          300 mg  Cottage cheese                  2 cup           300 mg  Orange juice with Calcium       8 oz            300 mg  Low fat dairy sources are recommended      You should get 30 minutes of moderate weight bearing exercise on most days of the week .  Weight bearing exercise includes such things as walking, jogging, hiking, dancing.  You should also get Strength training 2 or more times/week in addition to other weight -being exercise. Strength training uses weight or resistance beyond that seen in  everyday activities -(pilates, weight training with free weights, weight machines or resistance bands)

## 2020-12-29 ENCOUNTER — OFFICE VISIT (OUTPATIENT)
Dept: FAMILY MEDICINE | Facility: CLINIC | Age: 75
End: 2020-12-29
Payer: COMMERCIAL

## 2020-12-29 VITALS
SYSTOLIC BLOOD PRESSURE: 138 MMHG | DIASTOLIC BLOOD PRESSURE: 82 MMHG | RESPIRATION RATE: 16 BRPM | BODY MASS INDEX: 17.36 KG/M2 | HEART RATE: 81 BPM | TEMPERATURE: 97.7 F | OXYGEN SATURATION: 99 % | HEIGHT: 65 IN | WEIGHT: 104.2 LBS

## 2020-12-29 DIAGNOSIS — M25.511 CHRONIC RIGHT SHOULDER PAIN: Primary | ICD-10-CM

## 2020-12-29 DIAGNOSIS — I48.20 CHRONIC ATRIAL FIBRILLATION (H): ICD-10-CM

## 2020-12-29 DIAGNOSIS — I50.9 CHRONIC HEART FAILURE, UNSPECIFIED HEART FAILURE TYPE (H): ICD-10-CM

## 2020-12-29 DIAGNOSIS — E21.3 HYPERPARATHYROIDISM (H): ICD-10-CM

## 2020-12-29 DIAGNOSIS — G89.29 CHRONIC RIGHT SHOULDER PAIN: Primary | ICD-10-CM

## 2020-12-29 DIAGNOSIS — R06.09 DOE (DYSPNEA ON EXERTION): ICD-10-CM

## 2020-12-29 DIAGNOSIS — Z95.2 AORTIC VALVE REPLACED: ICD-10-CM

## 2020-12-29 DIAGNOSIS — I25.10 CORONARY ARTERY DISEASE INVOLVING NATIVE CORONARY ARTERY OF NATIVE HEART WITHOUT ANGINA PECTORIS: ICD-10-CM

## 2020-12-29 DIAGNOSIS — R42 DIZZINESS: ICD-10-CM

## 2020-12-29 LAB
ERYTHROCYTE [DISTWIDTH] IN BLOOD BY AUTOMATED COUNT: 13.7 % (ref 10–15)
HCT VFR BLD AUTO: 38.7 % (ref 35–47)
HGB BLD-MCNC: 11.9 G/DL (ref 11.7–15.7)
MCH RBC QN AUTO: 30.1 PG (ref 26.5–33)
MCHC RBC AUTO-ENTMCNC: 30.7 G/DL (ref 31.5–36.5)
MCV RBC AUTO: 98 FL (ref 78–100)
PLATELET # BLD AUTO: 172 10E9/L (ref 150–450)
RBC # BLD AUTO: 3.95 10E12/L (ref 3.8–5.2)
WBC # BLD AUTO: 4.8 10E9/L (ref 4–11)

## 2020-12-29 PROCEDURE — 80053 COMPREHEN METABOLIC PANEL: CPT | Performed by: FAMILY MEDICINE

## 2020-12-29 PROCEDURE — 84443 ASSAY THYROID STIM HORMONE: CPT | Performed by: FAMILY MEDICINE

## 2020-12-29 PROCEDURE — 36415 COLL VENOUS BLD VENIPUNCTURE: CPT | Performed by: FAMILY MEDICINE

## 2020-12-29 PROCEDURE — 99214 OFFICE O/P EST MOD 30 MIN: CPT | Performed by: FAMILY MEDICINE

## 2020-12-29 PROCEDURE — 85027 COMPLETE CBC AUTOMATED: CPT | Performed by: FAMILY MEDICINE

## 2020-12-29 ASSESSMENT — MIFFLIN-ST. JEOR: SCORE: 962.49

## 2020-12-29 NOTE — PROGRESS NOTES
Subjective     Devyn Link is a 75 year old female who presents to clinic today for the following health issues:    HPI         Here to discuss right shoulder pain that radiates down to the hand x 1-2 years. 2-3/10 sharp pain- while moving the shoulder.  Does have headaches and while standing will feel dizziness. Does have Sob with exertion and has to rest.  When resting will feel better.  Has had an episode when resting with SOB.  Has had chest pain.     See under ROS     Patient Active Problem List   Diagnosis     Hyperlipidemia LDL goal <70     CAD (coronary artery disease)     Long term current use of anticoagulant     Hypertension goal BP (blood pressure) < 140/90     Advanced directives, counseling/discussion     Bradycardia     Health Care Home     Chronic atrial fibrillation (H)     Heart failure (H)     Anemia     Pacemaker     Aortic valve replaced     Mitral valve replaced     Abnormal glucose     Prediabetes     Other osteoporosis without current pathological fracture     Iron deficiency anemia, unspecified iron deficiency anemia type     Hyperparathyroidism (H)     Hypercalcemia     History of ulcer disease       Current Outpatient Medications   Medication Sig Dispense Refill     alendronate (FOSAMAX) 70 MG tablet Take 1 tablet (70 mg) by mouth every 7 days 12 tablet 3     amoxicillin (AMOXIL) 500 MG capsule Take 4 capsules (2,000 mg) by mouth daily Take 4 cap--200mg-- 30-60 minutes before procedure 4 capsule 0     aspirin 81 MG tablet Take 81 mg by mouth daily       atorvastatin (LIPITOR) 20 MG tablet Take 1 tablet (20 mg) by mouth daily 90 tablet 1     carvedilol (COREG) 3.125 MG tablet Take 1 tablet (3.125 mg) by mouth 2 times daily (with meals) 60 tablet      Ferrous Sulfate (IRON SUPPLEMENT PO) Take 325 mg by mouth Every other day       furosemide (LASIX) 20 MG tablet Take 20 mg by mouth daily  60 tablet 1     losartan (COZAAR) 25 MG tablet Take 25 mg by mouth daily       nitroglycerin (NITROSTAT) 0.4  "MG SL tablet Place 1 tablet under the tongue every 5 minutes as needed for chest pain. 25 tablet 0     pantoprazole (PROTONIX) 40 MG EC tablet TAKE 1 TABLET EVERY DAY 90 tablet 0     warfarin (COUMADIN) 1 MG tablet As per INR clinic at [facility] 30 tablet          Review of Systems   CONSTITUTIONAL:NEGATIVE for fever, chills, change in weight  ENT/MOUTH: NEGATIVE for ear, mouth and throat problems  RESP:see below  CV: NEGATIVE for chest pain, palpitations or peripheral edema  MUSCULOSKELETAL: see below  NEURO: see below  PSYCHIATRIC: NEGATIVE for changes in mood or affect     present initially on the phone; not sure when she hung up.  did well with English.     Hip is better.    Currently reporting right shoulder pain.   No neck pain. But pain will go all the way down her arm, but not as severe.  Some numbness in the hand.   Comes and goes.   Moving shoulder up or reaching up can make it hurt.  Has been several years; getting worse recently. No injury.   Resting helps.  Right hand dominant.    Short of breath at times; believes due to mask. Walking can get her feel short of breath. Improves wth rest.  sometimes will get pain in back, behind right shoulder.   Will take NTG and feel better. Will take this for back pain.   Last time talked with Cardiology was in July.   This is not a big change.   Cardiology knows about this.     Long time ago was dizzy. Got a medication that made her better.   Now back in the last 6 months.    Feels like she will almost fall down.   Not a spinning; but more of a falling down; may be a motion sensation.    No falls. Has not fainted.  Keeping up with fluids.    Weak if she has not eaten recently.           Objective    Temp 97.7  F (36.5  C) (Oral)   Resp 16   Ht 1.657 m (5' 5.25\")   Wt 47.3 kg (104 lb 3.2 oz)   LMP  (LMP Unknown)   BMI 17.21 kg/m    Body mass index is 17.21 kg/m .           Physical Exam   GENERAL APPEARANCE: alert and no " distress  EYES: Eyes grossly normal to inspection, PERRL and conjunctivae and sclerae normal  NECK: no adenopathy, no asymmetry, masses, or scars and thyroid normal to palpation  RESP: lungs clear to auscultation - no rales, rhonchi or wheezes  CV: regular rates and rhythm  ABDOMEN: soft, nontender, without hepatosplenomegaly or masses and bowel sounds normal  MS: no edema.   Shoulder:  Mildly tender to palpation, more anteriorly. Range of motion is painful overhead.  Pain with resisted abduction at 90 degrees, and less at side. No pain with resisted adduction.  Pain with stressing the supraspinatus tendon.  Positive impingement sign.  PSYCH: mentation appears normal and affect normal/bright    Hemoglobin   Date Value Ref Range Status   01/14/2020 12.1 11.7 - 15.7 g/dL Final     Comment:     Results confirmed by repeat test   10/25/2018 11.9 11.7 - 15.7 g/dL Final     TSH   Date Value Ref Range Status   04/02/2018 0.87 0.35 - 4.94 uIU/mL Final         Reviewed Cardiology note from 4/2020.((Dr. Mao); also has another cardiologist (Dr. Humphrey)            Assessment & Plan     Chronic right shoulder pain  Uncertain etiology. At this time, referring to [facility].  She has also described some numbness and tingling into hand, but less of an issue.   May wish to consider Neuro if ongoing. Denies neck pain currenty.   - Orthopedic & Spine  Referral; Future    Chronic atrial fibrillation (H)  On anticoagulation. Sees Cardiology.     Chronic heart failure, unspecified heart failure type (H)  Does see Cardiology. Has been relatively stable; sounds like her dyspnea on exertion is at baseline.     Hyperparathyroidism (H)  She has been following with Endocrinology.    Dizziness  Uncertain etiology. She does not appear orthostatic currently. bp appears adequate.   I do not definitely get a picture of vertigo from her history.   At this time, will check following labs. Continue to monitor. Recommend hydration and making  sure that Cardiology is aware.   Given patient education materials from computer database regarding dizziness.  - CBC with platelets  - Comprehensive metabolic panel  - TSH with free T4 reflex    ORNELAS (dyspnea on exertion)  As above. Sounds like this is not significantly different from her baseline.   - TSH with free T4 reflex      Coronary artery disease involving native coronary artery of native heart without angina pectoris  Stable. Does follow with Cardiology.     Aortic valve replaced  As above.               Return in about 4 weeks (around 1/26/2021), or if symptoms worsen or fail to improve, for Wellness visit.    Prisca Lucero MD, MD  [facility]

## 2020-12-29 NOTE — PATIENT INSTRUCTIONS
I do recommend that you make sure you are well hydrated.  Make position changes slowly.     Make sure that your Cardiologist is aware of both the dizziness and the shortness of breath.

## 2020-12-30 LAB
ALBUMIN SERPL-MCNC: 3.9 G/DL (ref 3.4–5)
ALP SERPL-CCNC: 70 U/L (ref 40–150)
ALT SERPL W P-5'-P-CCNC: 22 U/L (ref 0–50)
ANION GAP SERPL CALCULATED.3IONS-SCNC: 2 MMOL/L (ref 3–14)
AST SERPL W P-5'-P-CCNC: 25 U/L (ref 0–45)
BILIRUB SERPL-MCNC: 0.8 MG/DL (ref 0.2–1.3)
BUN SERPL-MCNC: 22 MG/DL (ref 7–30)
CALCIUM SERPL-MCNC: 10.3 MG/DL (ref 8.5–10.1)
CHLORIDE SERPL-SCNC: 109 MMOL/L (ref 94–109)
CO2 SERPL-SCNC: 31 MMOL/L (ref 20–32)
CREAT SERPL-MCNC: 0.71 MG/DL (ref 0.52–1.04)
GFR SERPL CREATININE-BSD FRML MDRD: 82 ML/MIN/{1.73_M2}
GLUCOSE SERPL-MCNC: 96 MG/DL (ref 70–99)
POTASSIUM SERPL-SCNC: 4.2 MMOL/L (ref 3.4–5.3)
PROT SERPL-MCNC: 7.2 G/DL (ref 6.8–8.8)
SODIUM SERPL-SCNC: 142 MMOL/L (ref 133–144)
TSH SERPL DL<=0.005 MIU/L-ACNC: 0.7 MU/L (ref 0.4–4)

## 2021-01-30 DIAGNOSIS — Z87.898 HISTORY OF ULCER DISEASE: ICD-10-CM

## 2021-02-01 RX ORDER — PANTOPRAZOLE SODIUM 40 MG/1
TABLET, DELAYED RELEASE ORAL
Qty: 90 TABLET | Refills: 0 | Status: SHIPPED | OUTPATIENT
Start: 2021-02-01 | End: 2021-04-09

## 2021-02-01 NOTE — TELEPHONE ENCOUNTER
Routing refill request to provider for review/approval because:  Failed protocol for pantoprazole - pt has osteoporosis

## 2021-02-11 ENCOUNTER — TRANSFERRED RECORDS (OUTPATIENT)
Dept: HEALTH INFORMATION MANAGEMENT | Facility: CLINIC | Age: 76
End: 2021-02-11

## 2021-02-16 ENCOUNTER — IMMUNIZATION (OUTPATIENT)
Dept: PEDIATRICS | Facility: CLINIC | Age: 76
End: 2021-02-16
Payer: COMMERCIAL

## 2021-02-16 PROCEDURE — 91300 PR COVID VAC PFIZER DIL RECON 30 MCG/0.3 ML IM: CPT

## 2021-02-16 PROCEDURE — 0001A PR COVID VAC PFIZER DIL RECON 30 MCG/0.3 ML IM: CPT

## 2021-03-09 ENCOUNTER — IMMUNIZATION (OUTPATIENT)
Dept: PEDIATRICS | Facility: CLINIC | Age: 76
End: 2021-03-09
Attending: INTERNAL MEDICINE
Payer: COMMERCIAL

## 2021-03-09 PROCEDURE — 0002A PR COVID VAC PFIZER DIL RECON 30 MCG/0.3 ML IM: CPT

## 2021-03-09 PROCEDURE — 91300 PR COVID VAC PFIZER DIL RECON 30 MCG/0.3 ML IM: CPT

## 2021-03-20 ENCOUNTER — HEALTH MAINTENANCE LETTER (OUTPATIENT)
Age: 76
End: 2021-03-20

## 2021-04-09 DIAGNOSIS — Z87.898 HISTORY OF ULCER DISEASE: ICD-10-CM

## 2021-04-12 NOTE — TELEPHONE ENCOUNTER
Routing refill request to provider for review/approval because:  Failed protocol for pantoprazole - pt has osteoporosis - due for a wellness visit    Will forward to the station, please see if you can help the pt schedule an appt for a wellness visit.  Thanks!

## 2021-04-13 ENCOUNTER — APPOINTMENT (OUTPATIENT)
Dept: INTERPRETER SERVICES | Facility: CLINIC | Age: 76
End: 2021-04-13
Payer: COMMERCIAL

## 2021-04-13 RX ORDER — PANTOPRAZOLE SODIUM 40 MG/1
TABLET, DELAYED RELEASE ORAL
Qty: 90 TABLET | Refills: 0 | Status: SHIPPED | OUTPATIENT
Start: 2021-04-13

## 2021-05-31 NOTE — TELEPHONE ENCOUNTER
Medication is being filled for 1 time refill only due to:  Will be due for annual appointment in October.   90 day hasmukh given, per [facility] [place] for [facility] sites.    YULIA García Triage       skin normal color for race, warm, dry and intact.

## 2021-07-09 NOTE — LETTER
Form completed by Physician's Office.   June 18, 2018      Devyn Link  [id number] [place] [address]        Dear Ms. Devyn Fariba,    Enclosed is a copy of your recent results.     As we saw in clinic, the hemoglobin was improved.     Your calcium is flagged as being elevated. It is still a little elevated when I apply the correction, but not much.   I will do a couple additional labs and recheck this next time we do some blood work.     Hope all is well!   Prisca Lucero MD

## 2021-09-04 ENCOUNTER — HEALTH MAINTENANCE LETTER (OUTPATIENT)
Age: 76
End: 2021-09-04

## 2021-12-28 DIAGNOSIS — M81.8 OTHER OSTEOPOROSIS WITHOUT CURRENT PATHOLOGICAL FRACTURE: ICD-10-CM

## 2021-12-28 RX ORDER — ALENDRONATE SODIUM 70 MG/1
70 TABLET ORAL
Qty: 12 TABLET | Refills: 3 | OUTPATIENT
Start: 2021-12-28

## 2022-04-10 ENCOUNTER — HEALTH MAINTENANCE LETTER (OUTPATIENT)
Age: 77
End: 2022-04-10

## 2022-10-16 ENCOUNTER — HEALTH MAINTENANCE LETTER (OUTPATIENT)
Age: 77
End: 2022-10-16

## 2023-11-04 ENCOUNTER — HEALTH MAINTENANCE LETTER (OUTPATIENT)
Age: 78
End: 2023-11-04

## 2024-05-01 NOTE — NURSING NOTE
"Chief Complaint   Patient presents with     RECHECK     hyperparathyroidism, hypercalcemia     initial /58  Pulse 65  Temp 97.5  F (36.4  C) (Oral)  Resp 18  Ht 5' 5.25\" (1.657 m)  Wt 113 lb (51.3 kg)  LMP  (LMP Unknown)  SpO2 97%  BMI 18.66 kg/m2 Estimated body mass index is 18.66 kg/(m^2) as calculated from the following:    Height as of this encounter: 5' 5.25\" (1.657 m).    Weight as of this encounter: 113 lb (51.3 kg)..  bp completed using cuff size regular  ANDERSON BLACK LPN  "
Prophylactic measure

## (undated) DEVICE — KIT ENDO TURNOVER/PROCEDURE W/CLEAN A SCOPE LINERS 103888

## (undated) RX ORDER — FENTANYL CITRATE 50 UG/ML
INJECTION, SOLUTION INTRAMUSCULAR; INTRAVENOUS
Status: DISPENSED
Start: 2018-06-13